# Patient Record
Sex: MALE | Race: WHITE | Employment: OTHER | RURAL
[De-identification: names, ages, dates, MRNs, and addresses within clinical notes are randomized per-mention and may not be internally consistent; named-entity substitution may affect disease eponyms.]

---

## 2017-01-19 ENCOUNTER — OFFICE VISIT (OUTPATIENT)
Dept: FAMILY MEDICINE CLINIC | Age: 82
End: 2017-01-19

## 2017-01-19 VITALS
OXYGEN SATURATION: 97 % | HEIGHT: 66 IN | WEIGHT: 181 LBS | BODY MASS INDEX: 29.09 KG/M2 | TEMPERATURE: 97.2 F | SYSTOLIC BLOOD PRESSURE: 130 MMHG | HEART RATE: 71 BPM | DIASTOLIC BLOOD PRESSURE: 72 MMHG | RESPIRATION RATE: 22 BRPM

## 2017-01-19 DIAGNOSIS — R56.9 SEIZURE (HCC): ICD-10-CM

## 2017-01-19 DIAGNOSIS — I10 ESSENTIAL HYPERTENSION: ICD-10-CM

## 2017-01-19 DIAGNOSIS — E78.00 PURE HYPERCHOLESTEROLEMIA: ICD-10-CM

## 2017-01-19 DIAGNOSIS — K21.9 GASTROESOPHAGEAL REFLUX DISEASE WITHOUT ESOPHAGITIS: ICD-10-CM

## 2017-01-19 DIAGNOSIS — E11.65 TYPE 2 DIABETES MELLITUS WITH HYPERGLYCEMIA, WITHOUT LONG-TERM CURRENT USE OF INSULIN (HCC): Primary | ICD-10-CM

## 2017-01-19 DIAGNOSIS — I95.1 ORTHOSTATIC HYPOTENSION: ICD-10-CM

## 2017-01-19 RX ORDER — METFORMIN HYDROCHLORIDE 500 MG/1
TABLET, FILM COATED, EXTENDED RELEASE ORAL
COMMUNITY
End: 2017-01-19 | Stop reason: ALTCHOICE

## 2017-01-19 RX ORDER — METFORMIN HYDROCHLORIDE 500 MG/1
500 TABLET, EXTENDED RELEASE ORAL 2 TIMES DAILY
Qty: 60 TAB | Refills: 5
Start: 2017-01-19 | End: 2017-03-06 | Stop reason: SDUPTHER

## 2017-01-19 NOTE — MR AVS SNAPSHOT
Visit Information Date & Time Provider Department Dept. Phone Encounter #  
 1/19/2017  8:00 AM Fawad Boyce [phone] [id number] Follow-up Instructions Return in about 3 months (around 4/19/2017) for Follow up. Follow-up and Disposition History Your Appointments 2/21/2017  1:00 PM  
ESTABLISHED PATIENT with Malik Tobias MD  
[address] [place] [facility]) Appt Note: 6 MTH FU  $0 cp  
 [address] [facility] [address] [address] [phone]  
  
   
 [address] [address]  
  
    
 4/20/2017  8:00 AM  
ESTABLISHED PATIENT with MD Fawad Ferrer 72 ([facility]) Appt Note: 3 mo F/U  
 [address] [facility] [address]  
[address] [facility] [address] [place] [address] Upcoming Health Maintenance Date Due  
 FOOT EXAM Q1 3/20/1942 EYE EXAM RETINAL OR DILATED Q1 3/20/1942 DTaP/Tdap/Td series (1 - Tdap) 3/20/1953 ZOSTER VACCINE AGE 60> 3/20/1992 GLAUCOMA SCREENING Q2Y 3/20/1997 MICROALBUMIN Q1 6/16/2017 HEMOGLOBIN A1C Q6M 6/21/2017 MEDICARE YEARLY EXAM 10/20/2017 LIPID PANEL Q1 12/21/2017 Allergies as of 1/19/2017  Review Complete On: 1/19/2017 By: Meena West MD  
 No Known Allergies Current Immunizations  Reviewed on 10/19/2016 Name Date Influenza High Dose Vaccine PF 10/19/2016 Influenza Vaccine 10/17/2014, 10/11/2013  9:32 AM  
 Influenza Vaccine (Quad) 10/28/2015 10:28 AM  
 Pneumococcal Conjugate (PCV-13) 10/28/2015 10:28 AM  
 Pneumococcal Polysaccharide (PPSV-23) 10/11/2013  9:33 AM  
  
 Not reviewed this visit You Were Diagnosed With   
  
 Codes Comments Type 2 diabetes mellitus with hyperglycemia, without long-term current use of insulin (HCC)    -  Primary ICD-10-CM: E11.65 ICD-9-CM: 250.00, 790.29 Gastroesophageal reflux disease without esophagitis     ICD-10-CM: K21.9 ICD-9-CM: 530.81 Orthostatic hypotension     ICD-10-CM: I95.1 ICD-9-CM: 458.0 Essential hypertension     ICD-10-CM: I10 
ICD-9-CM: 401.9 Pure hypercholesterolemia     ICD-10-CM: E78.00 ICD-9-CM: 272.0 Seizure ([address]     ICD-10-CM: R56.9 ICD-9-CM: 780.39 Vitals BP Pulse Temp Resp Height(growth percentile) 130/72 (BP 1 Location: Left arm, BP Patient Position: Sitting) 71 97.2 °F (36.2 °C) (Temporal) 22 5' 6\" (1.676 m) Weight(growth percentile) SpO2 BMI Smoking Status 181 lb (82.1 kg) 97% 29.21 kg/m2 Current Every Day Smoker BMI and BSA Data Body Mass Index Body Surface Area  
 29.21 kg/m 2 1.96 m 2 Preferred Pharmacy Pharmacy Name Phone [address], [address] [facility]  [address] & [facility]. Adalid Albarado [phone] Your Updated Medication List  
  
   
This list is accurate as of: 1/19/17  8:29 AM.  Always use your most recent med list.  
  
  
  
  
 acetaminophen 500 mg tablet Commonly known as:  TYLENOL Take  by mouth every six (6) hours as needed. aspirin delayed-release 81 mg tablet Take 1 Tab by mouth daily. betamethasone valerate 0.1 % ointment Commonly known as:  Jacqueline Jay Apply  to affected area two (2) times a day. cinnamon bark-chromium picolin 500-100 mg-mcg Cap Take 1 Cap by mouth daily. Indications: DIABETES MELLITUS FIBER THERAPY (M-CELL/SUGAR) Powd Generic drug:  methylcellulose (laxative) Take  by mouth. Iron 325 mg (65 mg iron) tablet Generic drug:  ferrous sulfate Take  by mouth Daily (before breakfast). Take 1 every other day * LaMICtal 25 mg tablet Generic drug:  lamoTRIgine Take  by mouth daily. * lamoTRIgine 100 mg tablet Commonly known as: LaMICtal  
Take 100 mg by mouth every twelve (12) hours. levETIRAcetam 250 mg tablet Commonly known as:  KEPPRA Take 1 Tab by mouth two (2) times a day. metFORMIN  mg tablet Commonly known as:  GLUCOPHAGE XR Take 1 Tab by mouth two (2) times a day. pantoprazole 40 mg tablet Commonly known as:  PROTONIX  
TAKE 1 TABLET DAILY pindolol 5 mg tablet Commonly known as:  VISKIN  
TAKE 1 TABLET TWICE A DAY  
  
 pravastatin 20 mg tablet Commonly known as:  PRAVACHOL  
TAKE 1 TABLET AT BEDTIME  
  
 PROBIOTIC 4X 10-15 mg Tbec Generic drug:  B.infantis-B.ani-B.long-B.bifi Take  by mouth. QUININE SULFATE PO Take 300 mg by mouth as needed. * Notice: This list has 2 medication(s) that are the same as other medications prescribed for you. Read the directions carefully, and ask your doctor or other care provider to review them with you. Follow-up Instructions Return in about 3 months (around 4/19/2017) for Follow up. Introducing [facility] SERVICES! Dear Oksana Blackwell: Thank you for requesting a [employer] account. Our records indicate that you already have an active [employer] account. You can access your account anytime at [email or web address] Did you know that you can access your hospital and ER discharge instructions at any time in [employer]? You can also review all of your test results from your hospital stay or ER visit. Additional Information If you have questions, please visit the Frequently Asked Questions section of the [employer] website at [email or web address] Remember, [employer] is NOT to be used for urgent needs. For medical emergencies, dial 911. Now available from your iPhone and Android! Please provide this summary of care documentation to your next provider. Your primary care clinician is listed as Feng Esteban. If you have any questions after today's visit, please call [phone].

## 2017-01-19 NOTE — PROGRESS NOTES
Subjective:  Kavya Wright presents for follow-up doing well no interval problems. No chest pain, no angina no shortness of breath. No orthopnea or PND. No dependent edema. No abdominal pain, change in bowel habits, no blood in stool or black stools. No urinary frequency, urgency, dysuria. No change in voiding pattern or stream, no significant nocturia. No problems with medications and is compliant. Sugars fluctuating somewhat throughout the day typically fasting is 120 ± 20 his last hemoglobin A1c 12/21/16 was 6.8. Blood pressure has been well-controlled normotensive on checks  Denies any palpitations presyncope or syncope trying to keep active  No seizure activity continues to follow-up with neurology    Problem list reviewed as part of this encounter. Past Medical History   Diagnosis Date    Diabetes mellitus, type 2 ([address]     DJD (degenerative joint disease)     GERD (gastroesophageal reflux disease)     Hypertension     Orthostatic hypotension     Parasomnia     PUD (peptic ulcer disease)     Pure hypercholesterolemia     Seizure ([address]       Medication list reviewed and updated. Review of Systems -as per the above in previous documentation  Objective:  Visit Vitals    /72 (BP 1 Location: Left arm, BP Patient Position: Sitting)    Pulse 71    Temp 97.2 °F (36.2 °C) (Temporal)    Resp 22    Ht 5' 6\" (1.676 m)    Wt 181 lb (82.1 kg)    SpO2 97%    BMI 29.21 kg/m2     Alert and oriented. No acute distress. HEENT   Eyes pupils equal, round, react to light and accommodation. Extraocular movements intact. Nose patent. Mouth and throat is clear. Neck supple full range of motion no thyromegaly. No carotid bruits. No significant lymphadenopathy  Lungs clear to auscultation without wheezes, rales, or rhonchi. Heart  RRR,  S1 & S2 are normal intensity. No murmur; no gallop  Abdomen bowel sounds active. No tenderness, guarding, rebound, masses, organomegaly.   Back no CVA tenderness. Extremities without clubbing, cyanosis, or edema  Neuro HMF intact. Motor is 5 over 5 and symmetrical.    Results for orders placed or performed in visit on 12/21/16   CBC WITH AUTOMATED DIFF   Result Value Ref Range    WBC 7.0 3.4 - 10.8 x10E3/uL    RBC 5.23 4. 14 - 5.80 x10E6/uL    HGB 16.0 12.6 - 17.7 g/dL    HCT 45.2 37.5 - 51.0 %    MCV 86 79 - 97 fL    MCH 30.6 26.6 - 33.0 pg    MCHC 35.4 31.5 - 35.7 g/dL    RDW 13.3 12.3 - 15.4 %    PLATELET 783 460 - 078 x10E3/uL    NEUTROPHILS 87 %    Lymphocytes 6 %    MONOCYTES 6 %    EOSINOPHILS 1 %    BASOPHILS 0 %    ABS. NEUTROPHILS 6.1 1.4 - 7.0 x10E3/uL    Abs Lymphocytes 0.4 (L) 0.7 - 3.1 x10E3/uL    ABS. MONOCYTES 0.4 0.1 - 0.9 x10E3/uL    ABS. EOSINOPHILS 0.0 0.0 - 0.4 x10E3/uL    ABS. BASOPHILS 0.0 0.0 - 0.2 x10E3/uL    IMMATURE GRANULOCYTES 0 %    ABS. IMM. GRANS. 0.0 0.0 - 0.1 x10E3/uL   LIPID PANEL   Result Value Ref Range    Cholesterol, total 134 100 - 199 mg/dL    Triglyceride 66 0 - 149 mg/dL    HDL Cholesterol 44 >39 mg/dL    VLDL, calculated 13 5 - 40 mg/dL    LDL, calculated 77 0 - 99 mg/dL   METABOLIC PANEL, COMPREHENSIVE   Result Value Ref Range    Glucose 184 (H) 65 - 99 mg/dL    BUN 25 8 - 27 mg/dL    Creatinine 1.38 (H) 0.76 - 1.27 mg/dL    GFR est non-AA 47 (L) >59 mL/min/1.73    GFR est AA 54 (L) >59 mL/min/1.73    BUN/Creatinine ratio 18 10 - 22    Sodium 141 134 - 144 mmol/L    Potassium 4.9 3.5 - 5.2 mmol/L    Chloride 100 96 - 106 mmol/L    CO2 25 18 - 29 mmol/L    Calcium 8.6 8.6 - 10.2 mg/dL    Protein, total 6.3 6.0 - 8.5 g/dL    Albumin 4.1 3.5 - 4.7 g/dL    GLOBULIN, TOTAL 2.2 1.5 - 4.5 g/dL    A-G Ratio 1.9 1.1 - 2.5    Bilirubin, total 0.7 0.0 - 1.2 mg/dL    Alk.  phosphatase 90 39 - 117 IU/L    AST 28 0 - 40 IU/L    ALT 17 0 - 44 IU/L   HEMOGLOBIN A1C WITH EAG   Result Value Ref Range    Hemoglobin A1c 6.8 (H) 4.8 - 5.6 %    Estimated average glucose 148 mg/dL   LAMOTRIGINE (LAMICTAL)   Result Value Ref Range Lamotrigine 2.2 2.0 - 20.0 ug/mL   LEVETIRACETAM (KEPPRA)   Result Value Ref Range    LEVETIRACETAM, S 1.7 (L) 10.0 - 40.0 ug/mL   CKD REPORT   Result Value Ref Range    Interpretation Note          Assessment:  Encounter Diagnoses   Name Primary?  Type 2 diabetes mellitus with hyperglycemia, without long-term current use of insulin (HCC) Yes    Gastroesophageal reflux disease without esophagitis     Orthostatic hypotension     Essential hypertension     Pure hypercholesterolemia     Seizure ([facility] [address]            Plan:  See orders. Orders Placed This Encounter    DISCONTD: metFORMIN (GLUMETZA ER) 500 mg TG24 24 hour tablet     Sig: Take  by mouth.  metFORMIN ER (GLUCOPHAGE XR) 500 mg tablet     Sig: Take 1 Tab by mouth two (2) times a day. Dispense:  60 Tab     Refill:  5    medications reviewed and will continue on same continue to watch diet. Monitor blood sugar blood pressure bring in for review  Follow-up 3 months or as needed        There are no Patient Instructions on file for this visit.     (Please note that portions of this note were completed with a voice recognition program. Efforts were made to edit the dictations but occasionally words are mis-transcribed.)

## 2017-02-21 ENCOUNTER — OFFICE VISIT (OUTPATIENT)
Dept: CARDIOLOGY CLINIC | Age: 82
End: 2017-02-21

## 2017-02-21 VITALS
RESPIRATION RATE: 18 BRPM | WEIGHT: 182 LBS | HEIGHT: 66 IN | DIASTOLIC BLOOD PRESSURE: 70 MMHG | OXYGEN SATURATION: 99 % | HEART RATE: 69 BPM | BODY MASS INDEX: 29.25 KG/M2 | SYSTOLIC BLOOD PRESSURE: 130 MMHG

## 2017-02-21 DIAGNOSIS — R42 DIZZINESS: ICD-10-CM

## 2017-02-21 DIAGNOSIS — I95.1 ORTHOSTATIC HYPOTENSION: Primary | ICD-10-CM

## 2017-02-21 DIAGNOSIS — E78.00 PURE HYPERCHOLESTEROLEMIA: ICD-10-CM

## 2017-02-21 DIAGNOSIS — E11.65 TYPE 2 DIABETES MELLITUS WITH HYPERGLYCEMIA, WITHOUT LONG-TERM CURRENT USE OF INSULIN (HCC): ICD-10-CM

## 2017-02-21 DIAGNOSIS — G45.9 TRANSIENT CEREBRAL ISCHEMIA, UNSPECIFIED TYPE: ICD-10-CM

## 2017-02-21 DIAGNOSIS — I10 ESSENTIAL HYPERTENSION: ICD-10-CM

## 2017-02-21 NOTE — MR AVS SNAPSHOT
Visit Information Date & Time Provider Department Dept. Phone Encounter #  
 2/21/2017  1:00 PM Lucía Benito, [address] [facility] [phone] Follow-up Instructions Return in about 6 months (around 8/21/2017). Follow-up and Disposition History Your Appointments 4/20/2017  8:00 AM  
ESTABLISHED PATIENT with Precious Mathew MD  
[address] [place] ([place] [place]) Appt Note: 3 mo F/U  
 [address] [address]  
[address] [facility] [address] [address] 7/18/2017  2:20 PM  
ESTABLISHED PATIENT with Lucía Benito MD  
[address] [place] [facility]) Appt Note: 6 mo fu $0cp [address] [facility] [address] [address] [phone]  
  
   
 [address] [place] [address] Upcoming Health Maintenance Date Due  
 FOOT EXAM Q1 3/20/1942 EYE EXAM RETINAL OR DILATED Q1 3[birth year]20/1942 DTaP/Tdap/Td series (1 - Tdap) 3/20/1953 ZOSTER VACCINE AGE 60> 3/20/1992 GLAUCOMA SCREENING Q2Y 3/20/1997 MICROALBUMIN Q1 6/16/2017 HEMOGLOBIN A1C Q6M 6/21/2017 MEDICARE YEARLY EXAM 10/20/2017 LIPID PANEL Q1 12/21/2017 Allergies as of 2/21/2017  Review Complete On: 2/21/2017 By: Lucía Benito MD  
 No Known Allergies Current Immunizations  Reviewed on 10/19/2016 Name Date Influenza High Dose Vaccine PF 10/19/2016 Influenza Vaccine 10/17/2014, 10/11/2013  9:32 AM  
 Influenza Vaccine (Quad) 10/28/2015 10:28 AM  
 Pneumococcal Conjugate (PCV-13) 10/28/2015 10:28 AM  
 Pneumococcal Polysaccharide (PPSV-23) 10/11/2013  9:33 AM  
  
 Not reviewed this visit You Were Diagnosed With   
  
 Codes Comments Orthostatic hypotension    -  Primary ICD-10-CM: I95.1 ICD-9-CM: 458.0 Essential hypertension     ICD-10-CM: I10 
ICD-9-CM: 401.9 Type 2 diabetes mellitus with hyperglycemia, without long-term current use of insulin (HCC)     ICD-10-CM: E11.65 ICD-9-CM: 250.00, 790.29 Pure hypercholesterolemia     ICD-10-CM: E78.00 ICD-9-CM: 272.0 Dizziness     ICD-10-CM: F34 ICD-9-CM: 780.4 Transient cerebral ischemia, unspecified type     ICD-10-CM: G45.9 ICD-9-CM: 435.9 Vitals BP Pulse Resp Height(growth percentile) Weight(growth percentile) SpO2  
 130/70 69 18 5' 6\" (1.676 m) 182 lb (82.6 kg) 99% BMI Smoking Status 29.38 kg/m2 Current Every Day Smoker Vitals History BMI and BSA Data Body Mass Index Body Surface Area  
 29.38 kg/m 2 1.96 m 2 Preferred Pharmacy Pharmacy Name Phone [address] [facility] [phone] Your Updated Medication List  
  
   
This list is accurate as of: 2/21/17  1:30 PM.  Always use your most recent med list.  
  
  
  
  
 acetaminophen 500 mg tablet Commonly known as:  TYLENOL Take  by mouth every six (6) hours as needed. aspirin delayed-release 81 mg tablet Take 1 Tab by mouth daily. betamethasone valerate 0.1 % ointment Commonly known as:  Selinda Devonte Apply  to affected area two (2) times a day. cinnamon bark-chromium picolin 500-100 mg-mcg Cap Take 1 Cap by mouth daily. Indications: DIABETES MELLITUS FIBER THERAPY (M-CELL/SUGAR) Powd Generic drug:  methylcellulose (laxative) Take  by mouth. Iron 325 mg (65 mg iron) tablet Generic drug:  ferrous sulfate Take  by mouth Daily (before breakfast). Take 1 every other day * LaMICtal 25 mg tablet Generic drug:  lamoTRIgine Take  by mouth daily. * lamoTRIgine 100 mg tablet Commonly known as: LaMICtal  
Take 100 mg by mouth every twelve (12) hours. levETIRAcetam 250 mg tablet Commonly known as:  KEPPRA Take 1 Tab by mouth two (2) times a day. metFORMIN  mg tablet Commonly known as:  GLUCOPHAGE XR Take 1 Tab by mouth two (2) times a day. pantoprazole 40 mg tablet Commonly known as:  PROTONIX  
TAKE 1 TABLET DAILY pindolol 5 mg tablet Commonly known as:  VISKIN  
TAKE 1 TABLET TWICE A DAY  
  
 pravastatin 20 mg tablet Commonly known as:  PRAVACHOL  
TAKE 1 TABLET AT BEDTIME  
  
 PROBIOTIC 4X 10-15 mg Tbec Generic drug:  B.infantis-B.ani-B.long-B.bifi Take  by mouth. QUININE SULFATE PO Take 300 mg by mouth as needed. * Notice: This list has 2 medication(s) that are the same as other medications prescribed for you. Read the directions carefully, and ask your doctor or other care provider to review them with you. We Performed the Following AMB POC EKG ROUTINE W/ 12 LEADS, INTER & REP [59588 CPT(R)] Follow-up Instructions Return in about 6 months (around 8/21/2017). Introducing [facility] & HEALTH SERVICES! Dear Roel Kumar: Thank you for requesting a [employer] account. Our records indicate that you already have an active [employer] account. You can access your account anytime at [email or web address] Did you know that you can access your hospital and ER discharge instructions at any time in [employer]? You can also review all of your test results from your hospital stay or ER visit. Additional Information If you have questions, please visit the Frequently Asked Questions section of the [employer] website at [email or web address] Remember, [employer] is NOT to be used for urgent needs. For medical emergencies, dial 911. Now available from your iPhone and Android! Please provide this summary of care documentation to your next provider. If you have any questions after today's visit, please call [phone].

## 2017-02-21 NOTE — PROGRESS NOTES
Keron Bravo is a 80 y.o. male is here for routine f/u. No CV sx or complaints. Some issues with seizures/tremor--now on Keppra and doing well (Neuro eval, EEG's, etc). Continues to see PCP for DM management/labs. The patient denies chest pain/ shortness of breath, orthopnea, PND, LE edema, palpitations, syncope, presyncope or fatigue. Patient Active Problem List    Diagnosis Date Noted    Se[facility] ([facility]     GERD (gastroesophageal reflux disease) 07/15/2015    Orthostatic hypotension     Hypertension     Pure hypercholesterolemia     [facility]       No primary care provider on file. Past Medical History   Diagnosis Date    [facility]     DJD (degenerative joint disease)     GERD (gastroesophageal reflux disease)     Hypertension     Orthostatic hypotension     Parasomnia     PUD (peptic ulcer disease)     Pure hypercholesterolemia     Seizure [facility])       Past Surgical History   Procedure Laterality Date    Hx knee replacement       Bilateral    Endoscopy, colon, diagnostic  01/2012    Hx cholecystectomy  02/2012     laparoscopic    Hx gi  01/2012     endoscopy,BX     No Known Allergies   Family History   Problem Relation Age of Onset    COPD Father     COPD Sister     COPD Sister       Social History     Social History    Marital status:      Spouse name: N/A    Number of children: N/A    Years of education: N/A     Occupational History    Not on file. Social History Main Topics    Smoking status: Current Every Day Smoker     Types: Cigars    Smokeless tobacco: Never Used    Alcohol use No    Drug use: No    Sexual activity: No     Other Topics Concern    Not on file     Social History Narrative      Current Outpatient Prescriptions   Medication Sig    pravastatin (PRAVACHOL) 20 mg tablet TAKE 1 TABLET AT BEDTIME    metFORMIN ER (GLUCOPHAGE XR) 500 mg tablet Take 1 Tab by mouth two (2) times a day.     levETIRAcetam (KEPPRA) 250 mg tablet Take 1 Tab by mouth two (2) times a day. (Patient taking differently: Take 250 mg by mouth nightly.)    lamoTRIgine (LAMICTAL) 100 mg tablet Take 100 mg by mouth every twelve (12) hours.  pantoprazole (PROTONIX) 40 mg tablet TAKE 1 TABLET DAILY    cinnamon bark-chromium picolin 500-100 mg-mcg cap Take 1 Cap by mouth daily. Indications: DIABETES MELLITUS    pindolol (VISKIN) 5 mg tablet TAKE 1 TABLET TWICE A DAY    aspirin delayed-release 81 mg tablet Take 1 Tab by mouth daily.  QUININE SULFATE PO Take 300 mg by mouth as needed.  ferrous sulfate (IRON) 325 mg (65 mg iron) tablet Take  by mouth Daily (before breakfast). Take 1 every other day    betamethasone valerate (VALISONE) 0.1 % ointment Apply  to affected area two (2) times a day. (Patient taking differently: Apply  to affected area two (2) times daily as needed.)    methylcellulose, laxative, (FIBER THERAPY) Powd Take  by mouth.  acetaminophen (TYLENOL) 500 mg tablet Take  by mouth every six (6) hours as needed.  B.infantis-B.ani-B.long-B.bifi (PROBIOTIC 4X) 10-15 mg TbEC Take  by mouth.  lamoTRIgine (LAMICTAL) 25 mg tablet Take  by mouth daily. No current facility-administered medications for this visit. Review of Symptoms:    CONST  No weight change. No fever, chills, sweats    ENT No visual changes, URI sx, sore throat    CV  See HPI   RESP  No cough, or sputum, wheezing. Also see HPI   GI  No abdominal pain or change in bowel habits. No heartburn or dysphagia. No melena or rectal bleeding.   No dysuria, urgency, frequency, hematuria   MSKEL  No joint pain, swelling. No muscle pain. SKIN  No rash or lesions. NEURO  No headache, syncope, or seizure. No weakness, loss of sensation, or paresthesias. PSYCH  No low mood or depression  No anxiety. HE/LYMPH  No easy bruising, abnormal bleeding, or enlarged glands.         Physical ExamPhysical Exam:    Visit Vitals    Pulse 69    Resp 18    Ht 5' 6\" (1.676 m)    Wt 182 lb (82.6 kg)    SpO2 99%    BMI 29.38 kg/m2     Extended / Orthostatic Vitals:  Patient Position 2: Sitting  BP 2: 130/70  Pulse 2: 67  Patient Position 3: Standing  BP 3: 118/70  Pulse 3: 76    Gen: NAD  HEENT:  PERRL, throat clear  Neck: no mass or thyromegaly, no JVD   Heart:  Regular,Nl S1S2,  no murmur, gallop or rub.   Lungs:  clear  Abdomen:   Soft, non-tender, bowel sounds are active.   Extremities:  No edema  Pulse: symmetric  Neuro: A&O times 3, WNL      Cardiographics    ECG: NSR, PRWP, NST, no acute changes    CARDIAC TESTING;    Carotid Dopplers 2/4/16--minimal (<15%) LUCIEN plaque, 0% Left    Holter- 2/2/16--NSR, isolated PAC's/PVC's    Echo 2/4/16--Normal LV size/walls, LVEF 65-70, D1, trace to mild MR, RVSP <35      Labs:   Lab Results   Component Value Date/Time    Sodium 141 12/21/2016 08:18 AM    Sodium 143 06/16/2016 08:58 AM    Sodium 140 02/16/2016 09:07 AM    Sodium 142 10/28/2015 10:18 AM    Sodium 142 07/01/2015 08:10 AM    Potassium 4.9 12/21/2016 08:18 AM    Potassium 4.8 06/16/2016 08:58 AM    Potassium 5.1 02/16/2016 09:07 AM    Potassium 4.8 10/28/2015 10:18 AM    Potassium 4.3 07/01/2015 08:10 AM    Chloride 100 12/21/2016 08:18 AM    Chloride 103 06/16/2016 08:58 AM    Chloride 101 02/16/2016 09:07 AM    Chloride 101 10/28/2015 10:18 AM    Chloride 102 07/01/2015 08:10 AM    CO2 25 12/21/2016 08:18 AM    CO2 25 06/16/2016 08:58 AM    CO2 24 02/16/2016 09:07 AM    CO2 26 10/28/2015 10:18 AM    CO2 24 07/01/2015 08:10 AM    Glucose 184 12/21/2016 08:18 AM    Glucose 195 06/16/2016 08:58 AM    Glucose 342 02/16/2016 09:07 AM    Glucose 129 10/28/2015 10:18 AM    Glucose 153 07/01/2015 08:10 AM    BUN 25 12/21/2016 08:18 AM    BUN 12 06/16/2016 08:58 AM    BUN 15 02/16/2016 09:07 AM    BUN 12 10/28/2015 10:18 AM    BUN 16 07/01/2015 08:10 AM    Creatinine 1.38 12/21/2016 08:18 AM    Creatinine 1.00 06/16/2016 08:58 AM    Creatinine 1.27 02/16/2016 09:07 AM    Creatinine 1.24 10/28/2015 10:18 AM    Creatinine 1.17 07/01/2015 08:10 AM    BUN/Creatinine ratio 18 12/21/2016 08:18 AM    BUN/Creatinine ratio 12 06/16/2016 08:58 AM    BUN/Creatinine ratio 12 02/16/2016 09:07 AM    BUN/Creatinine ratio 10 10/28/2015 10:18 AM    BUN/Creatinine ratio 14 07/01/2015 08:10 AM    GFR est AA 54 12/21/2016 08:18 AM    GFR est AA 80 06/16/2016 08:58 AM    GFR est AA 60 02/16/2016 09:07 AM    GFR est AA 62 10/28/2015 10:18 AM    GFR est AA 66 07/01/2015 08:10 AM    GFR est non-AA 47 12/21/2016 08:18 AM    GFR est non-AA 69 06/16/2016 08:58 AM    GFR est non-AA 52 02/16/2016 09:07 AM    GFR est non-AA 53 10/28/2015 10:18 AM    GFR est non-AA 57 07/01/2015 08:10 AM    Calcium 8.6 12/21/2016 08:18 AM    Calcium 8.7 06/16/2016 08:58 AM    Calcium 8.7 02/16/2016 09:07 AM    Calcium 9.2 10/28/2015 10:18 AM    Calcium 8.8 07/01/2015 08:10 AM    Bilirubin, total 0.7 12/21/2016 08:18 AM    Bilirubin, total 0.3 06/16/2016 08:58 AM    Bilirubin, total 0.3 02/16/2016 09:07 AM    AST (SGOT) 28 12/21/2016 08:18 AM    AST (SGOT) 22 06/16/2016 08:58 AM    AST (SGOT) 29 02/16/2016 09:07 AM    AST (SGOT) 23 10/28/2015 10:18 AM    AST (SGOT) 31 07/01/2015 08:10 AM    Alk. phosphatase 90 12/21/2016 08:18 AM    Alk. phosphatase 97 06/16/2016 08:58 AM    Alk.  phosphatase 118 02/16/2016 09:07 AM    Protein, total 6.3 12/21/2016 08:18 AM    Protein, total 6.2 06/16/2016 08:58 AM    Protein, total 6.3 02/16/2016 09:07 AM    Albumin 4.1 12/21/2016 08:18 AM    Albumin 4.0 06/16/2016 08:58 AM    Albumin 3.8 02/16/2016 09:07 AM    A-G Ratio 1.9 12/21/2016 08:18 AM    A-G Ratio 1.8 06/16/2016 08:58 AM    A-G Ratio 1.5 02/16/2016 09:07 AM    ALT (SGPT) 17 12/21/2016 08:18 AM    ALT (SGPT) 18 06/16/2016 08:58 AM    ALT (SGPT) 38 02/16/2016 09:07 AM    ALT (SGPT) 19 10/17/2014 10:21 AM    ALT (SGPT) 32 05/16/2014 10:19 AM     No results found for: CPK, CPKX, CPX  Lab Results   Component Value Date/Time    Cholesterol, total 134 12/21/2016 08:18 AM    Cholesterol, total 133 06/16/2016 08:58 AM    Cholesterol, total 133 02/16/2016 09:07 AM    Cholesterol, total 157 10/28/2015 10:18 AM    Cholesterol, total 162 07/01/2015 08:10 AM    HDL Cholesterol 44 12/21/2016 08:18 AM    HDL Cholesterol 38 06/16/2016 08:58 AM    HDL Cholesterol 21 02/16/2016 09:07 AM    HDL Cholesterol 36 10/28/2015 10:18 AM    HDL Cholesterol 45 07/01/2015 08:10 AM    LDL, calculated 77 12/21/2016 08:18 AM    LDL, calculated 74 06/16/2016 08:58 AM    LDL, calculated 86 02/16/2016 09:07 AM    LDL, calculated 84 10/28/2015 10:18 AM    LDL, calculated 102 07/01/2015 08:10 AM    Triglyceride 66 12/21/2016 08:18 AM    Triglyceride 103 06/16/2016 08:58 AM    Triglyceride 132 02/16/2016 09:07 AM    Triglyceride 185 10/28/2015 10:18 AM    Triglyceride 76 07/01/2015 08:10 AM     No results found for this or any previous visit. Assessment:         Patient Active Problem List    Diagnosis Date Noted    Se[facility] ([facility]     GERD (gastroesophageal reflux disease) 07/15/2015    Orthostatic hypotension     Hypertension     Pure hypercholesterolemia     Diabetes mellitus, type 2 ([facility]       No CV sx or complaints. Some issues with seizures/tremor--now on Keppra and doing well (Neuro eval, EEG's, etc). Continues to see PCP for DM management/labs. Plan:     Doing well with no adverse cardiac symptoms. Lipids and labs followed by PCP. Continue current care and f/u in 6 months.     Melba Berkowitz MD

## 2017-02-27 RX ORDER — PINDOLOL 5 MG/1
TABLET ORAL
Qty: 180 TAB | Refills: 1 | Status: SHIPPED | OUTPATIENT
Start: 2017-02-27 | End: 2017-08-26 | Stop reason: SDUPTHER

## 2017-03-06 RX ORDER — METFORMIN HYDROCHLORIDE 500 MG/1
TABLET, EXTENDED RELEASE ORAL
Qty: 180 TAB | Refills: 0 | Status: SHIPPED | OUTPATIENT
Start: 2017-03-06 | End: 2017-06-09 | Stop reason: SDUPTHER

## 2017-03-06 RX ORDER — PANTOPRAZOLE SODIUM 40 MG/1
TABLET, DELAYED RELEASE ORAL
Qty: 90 TAB | Refills: 0 | Status: SHIPPED | OUTPATIENT
Start: 2017-03-06 | End: 2017-06-09 | Stop reason: SDUPTHER

## 2017-04-20 ENCOUNTER — OFFICE VISIT (OUTPATIENT)
Dept: FAMILY MEDICINE CLINIC | Age: 82
End: 2017-04-20

## 2017-04-20 VITALS
SYSTOLIC BLOOD PRESSURE: 153 MMHG | HEART RATE: 61 BPM | OXYGEN SATURATION: 99 % | DIASTOLIC BLOOD PRESSURE: 64 MMHG | BODY MASS INDEX: 29.22 KG/M2 | TEMPERATURE: 98.2 F | WEIGHT: 181.8 LBS | RESPIRATION RATE: 16 BRPM | HEIGHT: 66 IN

## 2017-04-20 DIAGNOSIS — E11.65 TYPE 2 DIABETES MELLITUS WITH HYPERGLYCEMIA, WITHOUT LONG-TERM CURRENT USE OF INSULIN (HCC): ICD-10-CM

## 2017-04-20 DIAGNOSIS — R56.9 SEIZURE (HCC): Primary | ICD-10-CM

## 2017-04-20 DIAGNOSIS — I10 ESSENTIAL HYPERTENSION: ICD-10-CM

## 2017-04-20 NOTE — MR AVS SNAPSHOT
Visit Information Date & Time Provider Department Dept. Phone Encounter #  
 4/20/2017  8:00 AM Gordo Medina MD [address] [place] [address] [phone] [id number] Follow-up Instructions Return in about 6 months (around 10/20/2017). Your Appointments 7/18/2017  2:20 PM  
ESTABLISHED PATIENT with Nani Leal MD  
[address] [facility] [place] [facility] [address]) Appt Note: 6 mo fu $0cp 13[address] [facility] 67 [id number] [phone]  
  
   
 [address] [place] [id number] Upcoming Health Maintenance Date Due  
 FOOT EXAM Q1 3/20/1942 EYE EXAM RETINAL OR DILATED Q1 3/20/1942 DTaP/Tdap/Td series (1 - Tdap) 3/20/1953 ZOSTER VACCINE AGE 60> 3/20/1992 GLAUCOMA SCREENING Q2Y 3/20/1997 MICROALBUMIN Q1 6/16/2017 HEMOGLOBIN A1C Q6M 6/21/2017 MEDICARE YEARLY EXAM 10/20/2017 LIPID PANEL Q1 12/21/2017 Allergies as of 4/20/2017  Review Complete On: 4/20/2017 By: Gordo Medina MD  
 No Known Allergies Current Immunizations  Reviewed on 10/19/2016 Name Date Influenza High Dose Vaccine PF 10/19/2016 Influenza Vaccine 10/17/2014, 10/11/2013  9:32 AM  
 Influenza Vaccine (Quad) 10/28/2015 10:28 AM  
 Pneumococcal Conjugate (PCV-13) 10/28/2015 10:28 AM  
 Pneumococcal Polysaccharide (PPSV-23) 10/11/2013  9:33 AM  
  
 Not reviewed this visit You Were Diagnosed With   
  
 Codes Comments Seizure ([facility]    -  Primary ICD-10-CM: R56.9 ICD-9-CM: 780.39 Type 2 diabetes mellitus with hyperglycemia, without long-term current use of insulin (HCC)     ICD-10-CM: E11.65 ICD-9-CM: 250.00, 790.29 Essential hypertension     ICD-10-CM: I10 
ICD-9-CM: 401.9 Vitals BP Pulse Temp Resp Height(growth percentile) 153/64 (BP 1 Location: Left arm, BP Patient Position: Sitting) 61 98.2 °F (36.8 °C) (Temporal) 16 5' 6\" (1.676 m) Weight(growth percentile) SpO2 BMI Smoking Status 181 lb 12.8 oz (82.5 kg) 99% 29.34 kg/m2 Current Every Day Smoker BMI and BSA Data Body Mass Index Body Surface Area  
 29.34 kg/m 2 1.96 m 2 Preferred Pharmacy Pharmacy Name Phone [address] [phone] Your Updated Medication List  
  
   
This list is accurate as of: 4/20/17  8:22 AM.  Always use your most recent med list.  
  
  
  
  
 acetaminophen 500 mg tablet Commonly known as:  TYLENOL Take  by mouth every six (6) hours as needed. aspirin delayed-release 81 mg tablet Take 1 Tab by mouth daily. betamethasone valerate 0.1 % ointment Commonly known as:  Fay Ing Apply  to affected area two (2) times a day. cinnamon bark-chromium picolin 500-100 mg-mcg Cap Take 1 Cap by mouth daily. Indications: DIABETES MELLITUS FIBER THERAPY (M-CELL/SUGAR) Powd Generic drug:  methylcellulose (laxative) Take  by mouth. Iron 325 mg (65 mg iron) tablet Generic drug:  ferrous sulfate Take  by mouth Daily (before breakfast). Take 1 every other day * LaMICtal 25 mg tablet Generic drug:  lamoTRIgine Take  by mouth daily. * lamoTRIgine 100 mg tablet Commonly known as: LaMICtal  
Take 100 mg by mouth every twelve (12) hours. levETIRAcetam 250 mg tablet Commonly known as:  KEPPRA Take 1 Tab by mouth two (2) times a day. metFORMIN  mg tablet Commonly known as:  GLUCOPHAGE XR  
TAKE 2 TABLETS DAILY pantoprazole 40 mg tablet Commonly known as:  PROTONIX  
TAKE 1 TABLET DAILY pindolol 5 mg tablet Commonly known as:  VISKIN  
TAKE 1 TABLET TWICE A DAY  
  
 pravastatin 20 mg tablet Commonly known as:  PRAVACHOL  
TAKE 1 TABLET AT BEDTIME  
  
 PROBIOTIC 4X 10-15 mg Tbec Generic drug:  B.infantis-B.ani-B.long-B.bifi Take  by mouth.   
  
 QUININE SULFATE PO  
 Take 300 mg by mouth as needed. * Notice: This list has 2 medication(s) that are the same as other medications prescribed for you. Read the directions carefully, and ask your doctor or other care provider to review them with you. Follow-up Instructions Return in about 6 months (around 10/20/2017). Introducing [facility] SERVICES! Dear Hang Burks: Thank you for requesting a [employer] account. Our records indicate that you already have an active [employer] account. You can access your account anytime at [email or web address] Did you know that you can access your hospital and ER discharge instructions at any time in [employer]? You can also review all of your test results from your hospital stay or ER visit. Additional Information If you have questions, please visit the Frequently Asked Questions section of the [employer] website at [email or web address] Remember, [employer] is NOT to be used for urgent needs. For medical emergencies, dial 911. Now available from your iPhone and Android! Please provide this summary of care documentation to your next provider. Your primary care clinician is listed as Denia Nicole. If you have any questions after today's visit, please call [phone].

## 2017-04-20 NOTE — PROGRESS NOTES
Chief Complaint   Patient presents with    Elevated Blood Pressure         HPI:      Mr Talha Sifuentes is a very pleasant 79 yo retired [facility] [employer] [facility] (21 yrs) who has T2DM, mixed hyperlipidemia, and orthostatic hypotension. Dec 2016 A1c = 6.8. Managed with Metformin monotherapy. Dr Mahad Vickers follows for orthostatic hypotension. Latest LDL = 77 (Pravastatin). Neurologically evaluated and noted to have a firm diagnosis of Essential Tremor. Also taking meds for suspected sx disorder. Last seen by TONE Huerta, who initiated Lamictal in Sept 2016 and scheduled Mr Talha Sifuentes for a one year follow up. New Issues:  Has just cut 57 trees [place] his property this year. Home glucose runs in the 120-200 range and he notes that this is very exercise dependent. No Known Allergies    Current Outpatient Prescriptions   Medication Sig    metFORMIN ER (GLUCOPHAGE XR) 500 mg tablet TAKE 2 TABLETS DAILY    pantoprazole (PROTONIX) 40 mg tablet TAKE 1 TABLET DAILY    pindolol (VISKIN) 5 mg tablet TAKE 1 TABLET TWICE A DAY    pravastatin (PRAVACHOL) 20 mg tablet TAKE 1 TABLET AT BEDTIME    levETIRAcetam (KEPPRA) 250 mg tablet Take 1 Tab by mouth two (2) times a day. (Patient taking differently: Take 250 mg by mouth nightly.)    lamoTRIgine (LAMICTAL) 100 mg tablet Take 100 mg by mouth every twelve (12) hours.  lamoTRIgine (LAMICTAL) 25 mg tablet Take  by mouth daily.  cinnamon bark-chromium picolin 500-100 mg-mcg cap Take 1 Cap by mouth daily. Indications: DIABETES MELLITUS    aspirin delayed-release 81 mg tablet Take 1 Tab by mouth daily.  ferrous sulfate (IRON) 325 mg (65 mg iron) tablet Take  by mouth Daily (before breakfast). Take 1 every other day    betamethasone valerate (VALISONE) 0.1 % ointment Apply  to affected area two (2) times a day. (Patient taking differently: Apply  to affected area two (2) times daily as needed.)    methylcellulose, laxative, (FIBER THERAPY) Powd Take  by mouth.     B.infantis-B.ani-B.long-B.bifi (PROBIOTIC 4X) 10-15 mg TbEC Take  by mouth.  QUININE SULFATE PO Take 300 mg by mouth as needed.  acetaminophen (TYLENOL) 500 mg tablet Take  by mouth every six (6) hours as needed. No current facility-administered medications for this visit. Past Medical History:   Diagnosis Date    [facility] mellitus, type 2 ([facility]     DJD (degenerative joint disease)     GERD (gastroesophageal reflux disease)     Hypertension     Orthostatic hypotension     Parasomnia     PUD (peptic ulcer disease)     Pure hypercholesterolemia     Seizure ([facility]          ROS:  Denies fever, chills, cough, chest pain, SOB,  nausea, vomiting, or diarrhea. Denies wt loss, wt gain, hemoptysis, hematochezia or melena. Physical Examination:    /64 (BP 1 Location: Left arm, BP Patient Position: Sitting)  Pulse 61  Temp 98.2 °F (36.8 °C) (Temporal)   Resp 16  Ht 5' 6\" (1.676 m)  Wt 181 lb 12.8 oz (82.5 kg)  SpO2 99%  BMI 29.34 kg/m2    General: Alert and Ox3, Fluent speech  HEENT:  NC/AT, EOMI, OP: clear  Neck:  Supple, no adenopathy, JVD, mass or bruit  Chest:  Clear to Ausculation, without wheezes, rales, rubs or ronchi  Cardiac: RRR  Abdomen:  +BS, soft, nontender without palpable HSM  Extremities:  No cyanosis, clubbing or edema  Neurologic:  Ambulatory without assist, CN 2-12 grossly intact. Moves all extremities. Skin: no rash  Lymphadenopathy: no cervical or supraclavicular nodes      ASSESSMENT AND PLAN:     1. T2DM:  Labs today to assess degree of control. Foot care and hypoglycemic teaching  2. HTN:  A little up today but he is typically at goal  3. Sz:  Checking levels. 4.  RTC in 6 months    No orders of the defined types were placed in this encounter.       Wenceslao Romero MD, [address]

## 2017-04-23 LAB
ALBUMIN SERPL-MCNC: 4.1 G/DL (ref 3.5–4.7)
ALBUMIN/GLOB SERPL: 2 {RATIO} (ref 1.2–2.2)
ALP SERPL-CCNC: 107 IU/L (ref 39–117)
ALT SERPL-CCNC: 20 IU/L (ref 0–44)
AST SERPL-CCNC: 25 IU/L (ref 0–40)
BASOPHILS # BLD AUTO: 0 X10E3/UL (ref 0–0.2)
BASOPHILS NFR BLD AUTO: 0 %
BILIRUB SERPL-MCNC: 0.3 MG/DL (ref 0–1.2)
BUN SERPL-MCNC: 12 MG/DL (ref 8–27)
BUN/CREAT SERPL: 10 (ref 10–24)
CALCIUM SERPL-MCNC: 8.7 MG/DL (ref 8.6–10.2)
CHLORIDE SERPL-SCNC: 101 MMOL/L (ref 96–106)
CHOLEST SERPL-MCNC: 142 MG/DL (ref 100–199)
CO2 SERPL-SCNC: 22 MMOL/L (ref 18–29)
CREAT SERPL-MCNC: 1.2 MG/DL (ref 0.76–1.27)
EOSINOPHIL # BLD AUTO: 0.2 X10E3/UL (ref 0–0.4)
EOSINOPHIL NFR BLD AUTO: 4 %
ERYTHROCYTE [DISTWIDTH] IN BLOOD BY AUTOMATED COUNT: 13.9 % (ref 12.3–15.4)
EST. AVERAGE GLUCOSE BLD GHB EST-MCNC: 157 MG/DL
GLOBULIN SER CALC-MCNC: 2.1 G/DL (ref 1.5–4.5)
GLUCOSE SERPL-MCNC: 286 MG/DL (ref 65–99)
HBA1C MFR BLD: 7.1 % (ref 4.8–5.6)
HCT VFR BLD AUTO: 42.2 % (ref 37.5–51)
HDLC SERPL-MCNC: 39 MG/DL
HGB BLD-MCNC: 13.8 G/DL (ref 12.6–17.7)
IMM GRANULOCYTES # BLD: 0 X10E3/UL (ref 0–0.1)
IMM GRANULOCYTES NFR BLD: 0 %
INTERPRETATION: NORMAL
LAMOTRIGINE SERPL-MCNC: 4.4 UG/ML (ref 2–20)
LDLC SERPL CALC-MCNC: 70 MG/DL (ref 0–99)
LEVETIRACETAM SERPL-MCNC: 3.2 UG/ML (ref 10–40)
LYMPHOCYTES # BLD AUTO: 1.3 X10E3/UL (ref 0.7–3.1)
LYMPHOCYTES NFR BLD AUTO: 28 %
MCH RBC QN AUTO: 29.8 PG (ref 26.6–33)
MCHC RBC AUTO-ENTMCNC: 32.7 G/DL (ref 31.5–35.7)
MCV RBC AUTO: 91 FL (ref 79–97)
MONOCYTES # BLD AUTO: 0.3 X10E3/UL (ref 0.1–0.9)
MONOCYTES NFR BLD AUTO: 7 %
NEUTROPHILS # BLD AUTO: 2.9 X10E3/UL (ref 1.4–7)
NEUTROPHILS NFR BLD AUTO: 61 %
PLATELET # BLD AUTO: 181 X10E3/UL (ref 150–379)
POTASSIUM SERPL-SCNC: 5.1 MMOL/L (ref 3.5–5.2)
PROT SERPL-MCNC: 6.2 G/DL (ref 6–8.5)
RBC # BLD AUTO: 4.63 X10E6/UL (ref 4.14–5.8)
SODIUM SERPL-SCNC: 142 MMOL/L (ref 134–144)
TRIGL SERPL-MCNC: 164 MG/DL (ref 0–149)
TSH SERPL DL<=0.005 MIU/L-ACNC: 2.82 UIU/ML (ref 0.45–4.5)
VLDLC SERPL CALC-MCNC: 33 MG/DL (ref 5–40)
WBC # BLD AUTO: 4.8 X10E3/UL (ref 3.4–10.8)

## 2017-06-09 RX ORDER — PANTOPRAZOLE SODIUM 40 MG/1
TABLET, DELAYED RELEASE ORAL
Qty: 90 TAB | Refills: 1 | Status: SHIPPED | OUTPATIENT
Start: 2017-06-09 | End: 2017-12-13 | Stop reason: SDUPTHER

## 2017-06-09 RX ORDER — METFORMIN HYDROCHLORIDE 500 MG/1
TABLET, EXTENDED RELEASE ORAL
Qty: 180 TAB | Refills: 1 | Status: SHIPPED | OUTPATIENT
Start: 2017-06-09 | End: 2017-12-09 | Stop reason: SDUPTHER

## 2017-06-13 ENCOUNTER — TELEPHONE (OUTPATIENT)
Dept: FAMILY MEDICINE CLINIC | Age: 82
End: 2017-06-13

## 2017-06-13 DIAGNOSIS — K21.9 GASTROESOPHAGEAL REFLUX DISEASE WITHOUT ESOPHAGITIS: Primary | ICD-10-CM

## 2017-06-13 RX ORDER — PANTOPRAZOLE SODIUM 40 MG/1
40 TABLET, DELAYED RELEASE ORAL DAILY
Qty: 30 TAB | Refills: 11 | Status: SHIPPED | OUTPATIENT
Start: 2017-06-13 | End: 2017-07-18 | Stop reason: SDUPTHER

## 2017-07-18 ENCOUNTER — OFFICE VISIT (OUTPATIENT)
Dept: CARDIOLOGY CLINIC | Age: 82
End: 2017-07-18

## 2017-07-18 VITALS
HEART RATE: 60 BPM | WEIGHT: 181 LBS | SYSTOLIC BLOOD PRESSURE: 136 MMHG | HEIGHT: 66 IN | OXYGEN SATURATION: 97 % | RESPIRATION RATE: 14 BRPM | DIASTOLIC BLOOD PRESSURE: 64 MMHG | BODY MASS INDEX: 29.09 KG/M2

## 2017-07-18 DIAGNOSIS — I10 ESSENTIAL HYPERTENSION: Primary | ICD-10-CM

## 2017-07-18 DIAGNOSIS — R56.9 SEIZURE (HCC): ICD-10-CM

## 2017-07-18 DIAGNOSIS — R00.2 HEART PALPITATIONS: ICD-10-CM

## 2017-07-18 DIAGNOSIS — E11.65 TYPE 2 DIABETES MELLITUS WITH HYPERGLYCEMIA, WITHOUT LONG-TERM CURRENT USE OF INSULIN (HCC): ICD-10-CM

## 2017-07-18 DIAGNOSIS — R42 DIZZINESS: ICD-10-CM

## 2017-07-18 RX ORDER — LORAZEPAM 0.5 MG/1
TABLET ORAL AS NEEDED
COMMUNITY
End: 2017-10-19 | Stop reason: SDUPTHER

## 2017-07-18 NOTE — MR AVS SNAPSHOT
Visit Information Date & Time Provider Department Dept. Phone Encounter #  
 7/18/2017  2:20 PM Georgia Wilkerson, [address] [facility] [place] [phone] Follow-up Instructions Return in about 6 months (around 1/18/2018). Follow-up and Disposition History Your Appointments 2/20/2018  1:20 PM  
ESTABLISHED PATIENT with Georgia Wilkerson MD  
[address] [facility] [place] [facility] Appt Note: 6 mo fu $0cp 13[address] [facility] [phone] [id number] [phone]  
  
   
 [address] [facility] Upcoming Health Maintenance Date Due  
 FOOT EXAM Q1 3/20/1942 EYE EXAM RETINAL OR DILATED Q1 3[birth year]20/1942 DTaP/Tdap/Td series (1 - Tdap) 3/20/1953 ZOSTER VACCINE AGE 60> 3/20/1992 GLAUCOMA SCREENING Q2Y 3/20/1997 MICROALBUMIN Q1 6/16/2017 INFLUENZA AGE 9 TO ADULT 8/1/2017 MEDICARE YEARLY EXAM 10/20/2017 HEMOGLOBIN A1C Q6M 10/20/2017 LIPID PANEL Q1 4/20/2018 Allergies as of 7/18/2017  Review Complete On: 7/18/2017 By: Georgia Wilkerson MD  
 No Known Allergies Current Immunizations  Reviewed on 10/19/2016 Name Date Influenza High Dose Vaccine PF 10/19/2016 Influenza Vaccine 10/17/2014, 10/11/2013  9:32 AM  
 Influenza Vaccine (Quad) 10/28/2015 10:28 AM  
 Pneumococcal Conjugate (PCV-13) 10/28/2015 10:28 AM  
 Pneumococcal Polysaccharide (PPSV-23) 10/11/2013  9:33 AM  
  
 Not reviewed this visit You Were Diagnosed With   
  
 Codes Comments Essential hypertension    -  Primary ICD-10-CM: I10 
ICD-9-CM: 401.9 Heart palpitations     ICD-10-CM: R00.2 ICD-9-CM: 785.1 Type 2 diabetes mellitus with hyperglycemia, without long-term current use of insulin (HCC)     ICD-10-CM: E11.65 ICD-9-CM: 250.00, 790.29 Seizure ([address]     ICD-10-CM: R56.9 ICD-9-CM: 780.39 Dizziness     ICD-10-CM: Q36 ICD-9-CM: 780.4 Vitals BP Pulse Resp Height(growth percentile) Weight(growth percentile) SpO2  
 136/64 (BP 1 Location: Right arm, BP Patient Position: Sitting) 60 14 5' 6\" (1.676 m) 181 lb (82.1 kg) 97% BMI Smoking Status 29.21 kg/m2 Current Every Day Smoker BMI and BSA Data Body Mass Index Body Surface Area  
 29.21 kg/m 2 1.96 m 2 Preferred Pharmacy Pharmacy Name Phone [address], [address] [facility]  [address] [facility]. Iona Osler [phone] Your Updated Medication List  
  
   
This list is accurate as of: 7/18/17  3:13 PM.  Always use your most recent med list.  
  
  
  
  
 acetaminophen 500 mg tablet Commonly known as:  TYLENOL Take  by mouth every six (6) hours as needed. aspirin delayed-release 81 mg tablet Take 1 Tab by mouth daily. betamethasone valerate 0.1 % ointment Commonly known as:  Joesphine Hew Apply  to affected area two (2) times a day. cinnamon bark-chromium picolin 500-100 mg-mcg Cap Take 1 Cap by mouth daily. Indications: DIABETES MELLITUS FIBER THERAPY (M-CELL/SUGAR) Powd Generic drug:  methylcellulose (laxative) Take  by mouth. Iron 325 mg (65 mg iron) tablet Generic drug:  ferrous sulfate Take  by mouth Daily (before breakfast). Take 1 every other day * LaMICtal 25 mg tablet Generic drug:  lamoTRIgine Take  by mouth daily. * lamoTRIgine 100 mg tablet Commonly known as: LaMICtal  
Take 100 mg by mouth every twelve (12) hours. LORazepam 0.5 mg tablet Commonly known as:  ATIVAN Take  by mouth as needed for Anxiety. metFORMIN  mg tablet Commonly known as:  GLUCOPHAGE XR  
TAKE 2 TABLETS DAILY pantoprazole 40 mg tablet Commonly known as:  PROTONIX  
TAKE 1 TABLET DAILY pindolol 5 mg tablet Commonly known as:  VISKIN  
TAKE 1 TABLET TWICE A DAY  
  
 pravastatin 20 mg tablet Commonly known as:  PRAVACHOL  
 TAKE 1 TABLET AT BEDTIME  
  
 PROBIOTIC 4X 10-15 mg Tbec Generic drug:  B.infantis-B.ani-B.long-B.bifi Take  by mouth. QUININE SULFATE PO Take 300 mg by mouth as needed. * Notice: This list has 2 medication(s) that are the same as other medications prescribed for you. Read the directions carefully, and ask your doctor or other care provider to review them with you. We Performed the Following AMB POC EKG ROUTINE W/ 12 LEADS, INTER & REP [09405 CPT(R)] Follow-up Instructions Return in about 6 months (around 1/18/2018). Introducing [facility] & HEALTH SERVICES! Dear Chandan Fiore: Thank you for requesting a [employer] account. Our records indicate that you already have an active [employer] account. You can access your account anytime at [email or web address] Did you know that you can access your hospital and ER discharge instructions at any time in [employer]? You can also review all of your test results from your hospital stay or ER visit. Additional Information If you have questions, please visit the Frequently Asked Questions section of the [employer] website at [email or web address] Remember, [employer] is NOT to be used for urgent needs. For medical emergencies, dial 911. Now available from your iPhone and Android! Please provide this summary of care documentation to your next provider. Your primary care clinician is listed as Mattie Leyden. If you have any questions after today's visit, please call [phone].

## 2017-07-18 NOTE — PROGRESS NOTES
PATIENT ID VERIFIED WITH TWO PATIENT IDENTIFIERS. MEDICATION REVIEWED AND APPROVED BY DR. Constance Garcia.

## 2017-07-18 NOTE — PROGRESS NOTES
Abilio Marks is a 80 y.o. male is here for routine f/u. On seizure meds now--seeing Neurology. Occas palpitations/heart fluttering. The patient denies chest pain/ shortness of breath, orthopnea, PND, LE edema,  syncope, presyncope or fatigue. Patient Active Problem List    Diagnosis Date Noted    Seizure [facility])     GERD (gastroesophageal reflux disease) 07/15/2015    Orthostatic hypotension     Hypertension     Pure hypercholesterolemia     [facility]       Misael Myers MD  Past Medical History:   Diagnosis Date    [facility]     DJD (degenerative joint disease)     GERD (gastroesophageal reflux disease)     Hypertension     Orthostatic hypotension     Parasomnia     PUD (peptic ulcer disease)     Pure hypercholesterolemia     Seizure [facility])       Past Surgical History:   Procedure Laterality Date    ENDOSCOPY, COLON, DIAGNOSTIC  01/2012    HX CHOLECYSTECTOMY  02/2012    laparoscopic    HX GI  01/2012    endoscopy,BX    HX KNEE REPLACEMENT      Bilateral     No Known Allergies   Family History   Problem Relation Age of Onset    COPD Father     COPD Sister     COPD Sister       Social History     Social History    Marital status:      Spouse name: N/A    Number of children: N/A    Years of education: N/A     Occupational History    Not on file. Social History Main Topics    Smoking status: Current Every Day Smoker     Types: Cigars    Smokeless tobacco: Never Used    Alcohol use No    Drug use: No    Sexual activity: No     Other Topics Concern    Not on file     Social History Narrative      Current Outpatient Prescriptions   Medication Sig    LORazepam (ATIVAN) 0.5 mg tablet Take  by mouth as needed for Anxiety.     metFORMIN ER (GLUCOPHAGE XR) 500 mg tablet TAKE 2 TABLETS DAILY    pantoprazole (PROTONIX) 40 mg tablet TAKE 1 TABLET DAILY    pindolol (VISKIN) 5 mg tablet TAKE 1 TABLET TWICE A DAY    pravastatin (PRAVACHOL) 20 mg tablet TAKE 1 TABLET AT BEDTIME    lamoTRIgine (LAMICTAL) 100 mg tablet Take 100 mg by mouth every twelve (12) hours.  lamoTRIgine (LAMICTAL) 25 mg tablet Take  by mouth daily.  cinnamon bark-chromium picolin 500-100 mg-mcg cap Take 1 Cap by mouth daily. Indications: DIABETES MELLITUS    aspirin delayed-release 81 mg tablet Take 1 Tab by mouth daily.  QUININE SULFATE PO Take 300 mg by mouth as needed.  ferrous sulfate (IRON) 325 mg (65 mg iron) tablet Take  by mouth Daily (before breakfast). Take 1 every other day    betamethasone valerate (VALISONE) 0.1 % ointment Apply  to affected area two (2) times a day. (Patient taking differently: Apply  to affected area two (2) times daily as needed.)    methylcellulose, laxative, (FIBER THERAPY) Powd Take  by mouth.  acetaminophen (TYLENOL) 500 mg tablet Take  by mouth every six (6) hours as needed.  B.infantis-B.ani-B.long-B.bifi (PROBIOTIC 4X) 10-15 mg TbEC Take  by mouth. No current facility-administered medications for this visit. Review of Symptoms:    CONST  No weight change. No fever, chills, sweats    ENT No visual changes, URI sx, sore throat    CV  See HPI   RESP  No cough, or sputum, wheezing. Also see HPI   GI  No abdominal pain or change in bowel habits. No heartburn or dysphagia. No melena or rectal bleeding.   No dysuria, urgency, frequency, hematuria   MSKEL  No joint pain, swelling. No muscle pain. SKIN  No rash or lesions. NEURO  No headache, syncope, or seizure. No weakness, loss of sensation, or paresthesias. PSYCH  No low mood or depression  No anxiety. HE/LYMPH  No easy bruising, abnormal bleeding, or enlarged glands.         Physical ExamPhysical Exam:    Visit Vitals    /64 (BP 1 Location: Right arm, BP Patient Position: Sitting)    Pulse 60    Resp 14    Ht 5' 6\" (1.676 m)    Wt 181 lb (82.1 kg)    SpO2 97%    BMI 29.21 kg/m2     Gen: NAD  HEENT: PERRL, throat clear  Neck: no adenopathy, no thyromegaly, no JVD   Heart:  Regular,Nl S1S2,  no murmur, gallop or rub.   Lungs:  clear  Abdomen:   Soft, non-tender, bowel sounds are active.   Extremities:  No edema  Pulse: symmetric  Neuro: A&O times 3, No focal neuro deficits    Cardiographics    ECG: NSR 56, wnl    CARDIAC TESTING;    Carotid Dopplers 2/4/16--minimal (<15%) LUCIEN plaque, 0% Left    Holter- 2/2/16--NSR, isolated PAC's/PVC's    Echo 2/4/16--Normal LV size/walls, LVEF 65-70, D1, trace to mild MR, RVSP <35      Labs:   Lab Results   Component Value Date/Time    Sodium 142 04/20/2017 08:24 AM    Sodium 141 12/21/2016 08:18 AM    Sodium 143 06/16/2016 08:58 AM    Sodium 140 02/16/2016 09:07 AM    Sodium 142 10/28/2015 10:18 AM    Potassium 5.1 04/20/2017 08:24 AM    Potassium 4.9 12/21/2016 08:18 AM    Potassium 4.8 06/16/2016 08:58 AM    Potassium 5.1 02/16/2016 09:07 AM    Potassium 4.8 10/28/2015 10:18 AM    Chloride 101 04/20/2017 08:24 AM    Chloride 100 12/21/2016 08:18 AM    Chloride 103 06/16/2016 08:58 AM    Chloride 101 02/16/2016 09:07 AM    Chloride 101 10/28/2015 10:18 AM    CO2 22 04/20/2017 08:24 AM    CO2 25 12/21/2016 08:18 AM    CO2 25 06/16/2016 08:58 AM    CO2 24 02/16/2016 09:07 AM    CO2 26 10/28/2015 10:18 AM    Glucose 286 04/20/2017 08:24 AM    Glucose 184 12/21/2016 08:18 AM    Glucose 195 06/16/2016 08:58 AM    Glucose 342 02/16/2016 09:07 AM    Glucose 129 10/28/2015 10:18 AM    BUN 12 04/20/2017 08:24 AM    BUN 25 12/21/2016 08:18 AM    BUN 12 06/16/2016 08:58 AM    BUN 15 02/16/2016 09:07 AM    BUN 12 10/28/2015 10:18 AM    Creatinine 1.20 04/20/2017 08:24 AM    Creatinine 1.38 12/21/2016 08:18 AM    Creatinine 1.00 06/16/2016 08:58 AM    Creatinine 1.27 02/16/2016 09:07 AM    Creatinine 1.24 10/28/2015 10:18 AM    BUN/Creatinine ratio 10 04/20/2017 08:24 AM    BUN/Creatinine ratio 18 12/21/2016 08:18 AM    BUN/Creatinine ratio 12 06/16/2016 08:58 AM    BUN/Creatinine ratio 12 02/16/2016 09:07 AM    BUN/Creatinine ratio 10 10/28/2015 10:18 AM    GFR est AA 63 04/20/2017 08:24 AM    GFR est AA 54 12/21/2016 08:18 AM    GFR est AA 80 06/16/2016 08:58 AM    GFR est AA 60 02/16/2016 09:07 AM    GFR est AA 62 10/28/2015 10:18 AM    GFR est non-AA 55 04/20/2017 08:24 AM    GFR est non-AA 47 12/21/2016 08:18 AM    GFR est non-AA 69 06/16/2016 08:58 AM    GFR est non-AA 52 02/16/2016 09:07 AM    GFR est non-AA 53 10/28/2015 10:18 AM    Calcium 8.7 04/20/2017 08:24 AM    Calcium 8.6 12/21/2016 08:18 AM    Calcium 8.7 06/16/2016 08:58 AM    Calcium 8.7 02/16/2016 09:07 AM    Calcium 9.2 10/28/2015 10:18 AM    Bilirubin, total 0.3 04/20/2017 08:24 AM    Bilirubin, total 0.7 12/21/2016 08:18 AM    Bilirubin, total 0.3 06/16/2016 08:58 AM    Bilirubin, total 0.3 02/16/2016 09:07 AM    AST (SGOT) 25 04/20/2017 08:24 AM    AST (SGOT) 28 12/21/2016 08:18 AM    AST (SGOT) 22 06/16/2016 08:58 AM    AST (SGOT) 29 02/16/2016 09:07 AM    AST (SGOT) 23 10/28/2015 10:18 AM    Alk. phosphatase 107 04/20/2017 08:24 AM    Alk. phosphatase 90 12/21/2016 08:18 AM    Alk. phosphatase 97 06/16/2016 08:58 AM    Alk.  phosphatase 118 02/16/2016 09:07 AM    Protein, total 6.2 04/20/2017 08:24 AM    Protein, total 6.3 12/21/2016 08:18 AM    Protein, total 6.2 06/16/2016 08:58 AM    Protein, total 6.3 02/16/2016 09:07 AM    Albumin 4.1 04/20/2017 08:24 AM    Albumin 4.1 12/21/2016 08:18 AM    Albumin 4.0 06/16/2016 08:58 AM    Albumin 3.8 02/16/2016 09:07 AM    A-G Ratio 2.0 04/20/2017 08:24 AM    A-G Ratio 1.9 12/21/2016 08:18 AM    A-G Ratio 1.8 06/16/2016 08:58 AM    A-G Ratio 1.5 02/16/2016 09:07 AM    ALT (SGPT) 20 04/20/2017 08:24 AM    ALT (SGPT) 17 12/21/2016 08:18 AM    ALT (SGPT) 18 06/16/2016 08:58 AM    ALT (SGPT) 38 02/16/2016 09:07 AM    ALT (SGPT) 19 10/17/2014 10:21 AM     No results found for: CPK, CPKX, CPX  Lab Results   Component Value Date/Time    Cholesterol, total 142 04/20/2017 08:24 AM Cholesterol, total 134 12/21/2016 08:18 AM    Cholesterol, total 133 06/16/2016 08:58 AM    Cholesterol, total 133 02/16/2016 09:07 AM    Cholesterol, total 157 10/28/2015 10:18 AM    HDL Cholesterol 39 04/20/2017 08:24 AM    HDL Cholesterol 44 12/21/2016 08:18 AM    HDL Cholesterol 38 06/16/2016 08:58 AM    HDL Cholesterol 21 02/16/2016 09:07 AM    HDL Cholesterol 36 10/28/2015 10:18 AM    LDL, calculated 70 04/20/2017 08:24 AM    LDL, calculated 77 12/21/2016 08:18 AM    LDL, calculated 74 06/16/2016 08:58 AM    LDL, calculated 86 02/16/2016 09:07 AM    LDL, calculated 84 10/28/2015 10:18 AM    Triglyceride 164 04/20/2017 08:24 AM    Triglyceride 66 12/21/2016 08:18 AM    Triglyceride 103 06/16/2016 08:58 AM    Triglyceride 132 02/16/2016 09:07 AM    Triglyceride 185 10/28/2015 10:18 AM     No results found for this or any previous visit. Assessment:         Patient Active Problem List    Diagnosis Date Noted    [facility]     GERD (gastroesophageal reflux disease) 07/15/2015    Orthostatic hypotension     Hypertension     Pure hypercholesterolemia     Diabetes mellitus, type 2 ([facility]       On seizure meds now--seeing Neurology. Occas palpitations/heart fluttering. Cardiac w/u last year (Echo, Holter, etc)--benign     Plan:     Doing well with no adverse cardiac symptoms. Lipids and labs followed by PCP. Continue current care and f/u in 6 months.     Rashmi Carlton MD

## 2017-07-20 NOTE — TELEPHONE ENCOUNTER
Patient says he needs a new glucose meter, test strips and monitoring kit. His readings are off and he thinks his meter isn't accurate. He notes he tests twice daily (BID). He has plenty of lancets. [employer].

## 2017-07-21 RX ORDER — INSULIN PUMP SYRINGE, 3 ML
EACH MISCELLANEOUS
Qty: 1 KIT | Refills: 0 | Status: SHIPPED | OUTPATIENT
Start: 2017-07-21

## 2017-08-03 RX ORDER — PRAVASTATIN SODIUM 20 MG/1
20 TABLET ORAL
Qty: 90 TAB | Refills: 4 | Status: SHIPPED | OUTPATIENT
Start: 2017-08-03 | End: 2018-11-28 | Stop reason: SDUPTHER

## 2017-08-28 RX ORDER — PINDOLOL 5 MG/1
TABLET ORAL
Qty: 180 TAB | Refills: 1 | Status: SHIPPED | OUTPATIENT
Start: 2017-08-28 | End: 2018-02-24 | Stop reason: SDUPTHER

## 2017-10-19 ENCOUNTER — OFFICE VISIT (OUTPATIENT)
Dept: FAMILY MEDICINE CLINIC | Age: 82
End: 2017-10-19

## 2017-10-19 VITALS
WEIGHT: 176.6 LBS | SYSTOLIC BLOOD PRESSURE: 120 MMHG | OXYGEN SATURATION: 100 % | HEIGHT: 66 IN | DIASTOLIC BLOOD PRESSURE: 60 MMHG | HEART RATE: 68 BPM | TEMPERATURE: 98.5 F | RESPIRATION RATE: 16 BRPM | BODY MASS INDEX: 28.38 KG/M2

## 2017-10-19 DIAGNOSIS — Z23 ENCOUNTER FOR IMMUNIZATION: ICD-10-CM

## 2017-10-19 DIAGNOSIS — Z00.00 MEDICARE ANNUAL WELLNESS VISIT, SUBSEQUENT: Primary | ICD-10-CM

## 2017-10-19 DIAGNOSIS — R56.9 SEIZURE (HCC): ICD-10-CM

## 2017-10-19 DIAGNOSIS — I10 ESSENTIAL HYPERTENSION: ICD-10-CM

## 2017-10-19 DIAGNOSIS — E11.65 TYPE 2 DIABETES MELLITUS WITH HYPERGLYCEMIA, WITHOUT LONG-TERM CURRENT USE OF INSULIN (HCC): ICD-10-CM

## 2017-10-19 RX ORDER — LORAZEPAM 0.5 MG/1
0.5 TABLET ORAL
Qty: 20 TAB | Refills: 2 | Status: SHIPPED | OUTPATIENT
Start: 2017-10-19 | End: 2018-01-23 | Stop reason: ALTCHOICE

## 2017-10-19 NOTE — ACP (ADVANCE CARE PLANNING)
In the event that he is unable to speak for himself, please contact Katie Dimas at [phone]    Britney Lin

## 2017-10-19 NOTE — PROGRESS NOTES
Chief Complaint   Patient presents with    Elevated Blood Pressure         HPI:      Mr Niki Zaragoza is a very pleasant 81 yo retired [employer] [facility] (21 yrs) who has T2DM, mixed hyperlipidemia, and orthostatic hypotension. Dec 2016 A1c = 6.8. Managed with Metformin monotherapy. Dr Ashli Bautista follows for orthostatic hypotension. Latest LDL = 77 (Pravastatin). Neurologically evaluated and noted to have a firm diagnosis of Essential Tremor. Also taking meds for suspected sx disorder. Last seen by TONE Sotelo, who initiated Lamictal in Sept 2016 and scheduled Mr Niki Zaragoza for a one year follow up. New Issues:  Has just cut 57 trees [place] his property this year. Home glucose runs in the 120-200 range and he notes that this is very exercise dependent. No Known Allergies    Current Outpatient Prescriptions   Medication Sig    metFORMIN ER (GLUCOPHAGE XR) 500 mg tablet TAKE 2 TABLETS DAILY    pantoprazole (PROTONIX) 40 mg tablet TAKE 1 TABLET DAILY    pindolol (VISKIN) 5 mg tablet TAKE 1 TABLET TWICE A DAY    pravastatin (PRAVACHOL) 20 mg tablet TAKE 1 TABLET AT BEDTIME    levETIRAcetam (KEPPRA) 250 mg tablet Take 1 Tab by mouth two (2) times a day. (Patient taking differently: Take 250 mg by mouth nightly.)    lamoTRIgine (LAMICTAL) 100 mg tablet Take 100 mg by mouth every twelve (12) hours.  lamoTRIgine (LAMICTAL) 25 mg tablet Take  by mouth daily.  cinnamon bark-chromium picolin 500-100 mg-mcg cap Take 1 Cap by mouth daily. Indications: DIABETES MELLITUS    aspirin delayed-release 81 mg tablet Take 1 Tab by mouth daily.  ferrous sulfate (IRON) 325 mg (65 mg iron) tablet Take  by mouth Daily (before breakfast). Take 1 every other day    betamethasone valerate (VALISONE) 0.1 % ointment Apply  to affected area two (2) times a day. (Patient taking differently: Apply  to affected area two (2) times daily as needed.)    methylcellulose, laxative, (FIBER THERAPY) Powd Take  by mouth.     B.infantis-B.ani-B.long-B.bifi (PROBIOTIC 4X) 10-15 mg TbEC Take  by mouth.  QUININE SULFATE PO Take 300 mg by mouth as needed.  acetaminophen (TYLENOL) 500 mg tablet Take  by mouth every six (6) hours as needed. No current facility-administered medications for this visit. Past Medical History:   Diagnosis Date    [facility] ([facility]     DJD (degenerative joint disease)     GERD (gastroesophageal reflux disease)     Hypertension     Orthostatic hypotension     Parasomnia     PUD (peptic ulcer disease)     Pure hypercholesterolemia     Seizure ([facility]          ROS:  Denies fever, chills, cough, chest pain, SOB,  nausea, vomiting, or diarrhea. Denies wt loss, wt gain, hemoptysis, hematochezia or melena. Physical Examination:    /60  Pulse 68  Temp 98.5 °F (36.9 °C)  Resp 16  Ht 5' 6\" (1.676 m)  Wt 176 lb 9.6 oz (80.1 kg)  SpO2 100%  BMI 28.5 kg/m2    General: Alert and Ox3, Fluent speech  HEENT:  NC/AT, EOMI, OP: clear  Neck:  Supple, no adenopathy, JVD, mass or bruit  Chest:  Clear to Ausculation, without wheezes, rales, rubs or ronchi  Cardiac: RRR  Abdomen:  +BS, soft, nontender without palpable HSM  Extremities:  No cyanosis, clubbing or edema  Neurologic:  Ambulatory without assist, CN 2-12 grossly intact. Moves all extremities. Skin: no rash  Lymphadenopathy: no cervical or supraclavicular nodes  Diabetic Foot Exam:  Both feet are examined and demonstrate intact DP pulses. There is good capillary refill and sensation is intact. Skin is intact and there are no ulcers or sores. ASSESSMENT AND PLAN:     1. T2DM:  Labs today to assess degree of control. Foot care and hypoglycemic teaching  2. HTN:  excellent  3. Sz:  Checking levels. 4.  RTC in 6 months    No orders of the defined types were placed in this encounter.       Makayla Lopez MD, FACP        ______________________________________________________________________    Bibi rogers a 80 y.o. male and presents for annual Medicare Wellness Visit. Problem List: Reviewed with patient and discussed risk factors. Patient Active Problem List   Diagnosis Code    Hypertension I10    Pure hypercholesterolemia E78.00    Orthostatic hypotension I95.1    GERD (gastroesophageal reflux disease) K21.9    Seizure ([address] R56.9    Type 2 diabetes mellitus with hyperglycemia, without long-term current use of insulin (HCC) E11.65       Current medical providers:  Patient Care Team:  Agustín Godwin MD as PCP - General (Internal Medicine)  Lori Mcbride MD (Orthopedic Surgery)  Alan Cote MD (Cardiology)    [facility], [facility], Medications/Allergies: reviewed, on chart. Male Alcohol Screening: On any occasion during the past 3 months, have you had more than 4 drinks containing alcohol? No    Do you average more than 14 drinks per week? No    ROS:  Constitutional: No fever, chills or weight loss  Respiratory: No cough, SOB   CV: No chest pain or Palpitations    Objective:  Visit Vitals    /60    Pulse 68    Temp 98.5 °F (36.9 °C)    Resp 16    Ht 5' 6\" (1.676 m)    Wt 176 lb 9.6 oz (80.1 kg)    SpO2 100%    BMI 28.5 kg/m2    Body mass index is 28.5 kg/(m^2). Assessment of cognitive impairment: Alert and oriented x 3    Depression Screen:   PHQ over the last two weeks 10/19/2017   Little interest or pleasure in doing things Not at all   Feeling down, depressed or hopeless Not at all   Total Score PHQ 2 0       Fall Risk Assessment:    Fall Risk Assessment, last 12 mths 10/19/2017   Able to walk? Yes   Fall in past 12 months? No   Fall with injury? -   Number of falls in past 12 months -   Fall Risk Score -       Functional Ability:   Does the patient exhibit a steady gait? yes   How long did it take the patient to get up and walk from a sitting position?  12 sec   Is the patient self reliant?  (ie can do own laundry, meals, household chores)  yes     Does the patient handle his/her own medications? yes     Does the patient handle his/her own money? yes     Is the patients home safe (ie good lighting, handrails on stairs and bath, etc.)? yes     Did you notice or did patient express any hearing difficulties? yes     Did you notice or did patient express any vision difficulties? no       Advance Care Planning:   Patient was offered the opportunity to discuss advance care planning:  yes     Does patient have an Advance Directive:  yes   If no, did you provide information on Caring Connections?  no       Plan:      Orders Placed This Encounter    INFLUENZA VIRUS VACCINE, HIGH DOSE SEASONAL, PRESERVATIVE FREE    LIPID PANEL    METABOLIC PANEL, COMPREHENSIVE    HEMOGLOBIN A1C WITH EAG    CBC WITH AUTOMATED DIFF    TSH 3RD GENERATION    LEVETIRACETAM (KEPPRA)    LAMOTRIGINE (LAMICTAL)    MICROALBUMIN, UR, RAND W/ MICROALBUMIN/CREA RATIO    HM DIABETES FOOT EXAM    ADMIN INFLUENZA VIRUS [address] Maintenance   Topic Date Due    FOOT EXAM Q1  03/20/1942    EYE EXAM RETINAL OR DILATED Q1  03/20/1942    GLAUCOMA SCREENING Q2Y  03/20/1997    MICROALBUMIN Q1  06/16/2017    INFLUENZA AGE 9 TO ADULT  08/01/2017    HEMOGLOBIN A1C Q6M  10/20/2017    MEDICARE YEARLY EXAM  10/20/2017    LIPID PANEL Q1  04/20/2018    DTaP/Tdap/Td series (2 - Td) 10/19/2027    ZOSTER VACCINE AGE 60>  Addressed    Pneumococcal 65+ Low/Medium Risk  Completed       *Patient verbalized understanding and agreement with the plan. A copy of the After Visit Summary with personalized health plan was given to the patient today.

## 2017-10-19 NOTE — MR AVS SNAPSHOT
Visit Information Date & Time Provider Department Dept. Phone Encounter #  
 10/19/2017  8:30 AM Norma MaxwellFawad [facility] [phone] [id number] Follow-up Instructions Return in about 6 months (around 4/19/2018). Your Appointments 2/20/2018  1:20 PM  
ESTABLISHED PATIENT with Maurilio Fiore MD  
[address] [facility] [place] [address]) Appt Note: 6 mo fu $0cp [address] [facility] [address] [id number] [phone]  
  
   
 [address] [place] [address] Upcoming Health Maintenance Date Due  
 FOOT EXAM Q1 3/20/1942 EYE EXAM RETINAL OR DILATED Q1 3/20/1942 GLAUCOMA SCREENING Q2Y 3/20/1997 MICROALBUMIN Q1 6/16/2017 INFLUENZA AGE 9 TO ADULT 8/1/2017 HEMOGLOBIN A1C Q6M 10/20/2017 MEDICARE YEARLY EXAM 10/20/2017 LIPID PANEL Q1 4/20/2018 DTaP/Tdap/Td series (2 - Td) 10/19/2027 Allergies as of 10/19/2017  Review Complete On: 10/19/2017 By: Norma Maxwell MD  
 No Known Allergies Current Immunizations  Reviewed on 10/19/2016 Name Date Influenza High Dose Vaccine PF 10/19/2017, 10/19/2016 Influenza Vaccine 10/17/2014, 10/11/2013  9:32 AM  
 Influenza Vaccine (Quad) 10/28/2015 10:28 AM  
 Pneumococcal Conjugate (PCV-13) 10/28/2015 10:28 AM  
 Pneumococcal Polysaccharide (PPSV-23) 10/11/2013  9:33 AM  
  
 Not reviewed this visit You Were Diagnosed With   
  
 Codes Comments Medicare annual wellness visit, subsequent    -  Primary ICD-10-CM: Z00.00 ICD-9-CM: V70.0 Type 2 diabetes mellitus with hyperglycemia, without long-term current use of insulin (HCC)     ICD-10-CM: E11.65 ICD-9-CM: 250.00, 790.29 Essential hypertension     ICD-10-CM: I10 
ICD-9-CM: 401.9 Seizure ([address]     ICD-10-CM: R56.9 ICD-9-CM: 780.39 Encounter for immunization     ICD-10-CM: A55 ICD-9-CM: V03.89 Vitals BP Pulse Temp Resp Height(growth percentile) Weight(growth percentile) 120/60 68 98.5 °F (36.9 °C) 16 5' 6\" (1.676 m) 176 lb 9.6 oz (80.1 kg) SpO2 BMI Smoking Status 100% 28.5 kg/m2 Current Every Day Smoker BMI and BSA Data Body Mass Index Body Surface Area 28.5 kg/m 2 1.93 m 2 Preferred Pharmacy Pharmacy Name Phone [address] [facility] [phone] Your Updated Medication List  
  
   
This list is accurate as of: 10/19/17  9:20 AM.  Always use your most recent med list.  
  
  
  
  
 acetaminophen 500 mg tablet Commonly known as:  TYLENOL Take  by mouth every six (6) hours as needed. aspirin delayed-release 81 mg tablet Take 1 Tab by mouth daily. betamethasone valerate 0.1 % ointment Commonly known as:  Bony Knows Apply  to affected area two (2) times a day. Blood-Glucose Meter monitoring kit Test blood sugar daily. Dx. E11.65, not on insulin  
  
 cinnamon bark-chromium picolin 500-100 mg-mcg Cap Take 1 Cap by mouth daily. Indications: DIABETES MELLITUS FIBER THERAPY (M-CELL/SUGAR) Powd Generic drug:  methylcellulose (laxative) Take  by mouth. glucose blood VI test strips strip Commonly known as:  blood glucose test  
Test blood sugar daily. Dx E11.65 not on insulin Iron 325 mg (65 mg iron) tablet Generic drug:  ferrous sulfate Take  by mouth Daily (before breakfast). Take 1 every other day * LaMICtal 25 mg tablet Generic drug:  lamoTRIgine Take  by mouth daily. * lamoTRIgine 100 mg tablet Commonly known as: LaMICtal  
Take 50 mg by mouth daily. Taking 50 mg daily LORazepam 0.5 mg tablet Commonly known as:  ATIVAN Take  by mouth as needed for Anxiety. metFORMIN  mg tablet Commonly known as:  GLUCOPHAGE XR  
TAKE 2 TABLETS DAILY pantoprazole 40 mg tablet Commonly known as:  PROTONIX TAKE 1 TABLET DAILY pindolol 5 mg tablet Commonly known as:  VISKIN  
TAKE 1 TABLET TWICE A DAY  
  
 pravastatin 20 mg tablet Commonly known as:  PRAVACHOL Take 1 Tab by mouth nightly. PROBIOTIC 4X 10-15 mg Tbec Generic drug:  B.infantis-B.ani-B.long-B.bifi Take  by mouth. QUININE SULFATE PO Take 300 mg by mouth as needed. * Notice: This list has 2 medication(s) that are the same as other medications prescribed for you. Read the directions carefully, and ask your doctor or other care provider to review them with you. We Performed the Following ADMIN INFLUENZA VIRUS VAC [ HCPCS] CBC WITH AUTOMATED DIFF [66314 CPT(R)] HEMOGLOBIN A1C WITH EAG [88314 CPT(R)]  DIABETES FOOT EXAM [HM7 Custom] INFLUENZA VIRUS VACCINE, HIGH DOSE SEASONAL, PRESERVATIVE FREE [40895 CPT(R)] LAMOTRIGINE (LAMICTAL) [16606 CPT(R)] LEVETIRACETAM (KEPPRA) [id number] CPT(R)] LIPID PANEL [34380 CPT(R)] METABOLIC PANEL, COMPREHENSIVE [55745 CPT(R)] MICROALBUMIN, UR, RAND W/ MICROALBUMIN/CREA RATIO [id number] CPT(R)] TSH 3RD GENERATION [55860 CPT(R)] Follow-up Instructions Return in about 6 months (around 4/19/2018). Introducing [facility]! Dear Khalida Maynard: Thank you for requesting a [employer] account. Our records indicate that you already have an active [employer] account. You can access your account anytime at [email or web address] Did you know that you can access your hospital and ER discharge instructions at any time in [employer]? You can also review all of your test results from your hospital stay or ER visit. Additional Information If you have questions, please visit the Frequently Asked Questions section of the [employer] website at [email or web address] Remember, [employer] is NOT to be used for urgent needs. For medical emergencies, dial 911. Now available from your iPhone and Android! Please provide this summary of care documentation to your next provider. Your primary care clinician is listed as Alexa Moreno. If you have any questions after today's visit, please call [phone].

## 2017-10-23 LAB
ALBUMIN SERPL-MCNC: 4.3 G/DL (ref 3.5–4.7)
ALBUMIN/CREAT UR: 3.9 MG/G CREAT (ref 0–30)
ALBUMIN/GLOB SERPL: 1.8 {RATIO} (ref 1.2–2.2)
ALP SERPL-CCNC: 110 IU/L (ref 39–117)
ALT SERPL-CCNC: 20 IU/L (ref 0–44)
AST SERPL-CCNC: 23 IU/L (ref 0–40)
BASOPHILS # BLD AUTO: 0 X10E3/UL (ref 0–0.2)
BASOPHILS NFR BLD AUTO: 0 %
BILIRUB SERPL-MCNC: 0.3 MG/DL (ref 0–1.2)
BUN SERPL-MCNC: 15 MG/DL (ref 8–27)
BUN/CREAT SERPL: 14 (ref 10–24)
CALCIUM SERPL-MCNC: 9.1 MG/DL (ref 8.6–10.2)
CHLORIDE SERPL-SCNC: 101 MMOL/L (ref 96–106)
CHOLEST SERPL-MCNC: 149 MG/DL (ref 100–199)
CO2 SERPL-SCNC: 26 MMOL/L (ref 18–29)
CREAT SERPL-MCNC: 1.08 MG/DL (ref 0.76–1.27)
CREAT UR-MCNC: 80.2 MG/DL
EOSINOPHIL # BLD AUTO: 0.2 X10E3/UL (ref 0–0.4)
EOSINOPHIL NFR BLD AUTO: 4 %
ERYTHROCYTE [DISTWIDTH] IN BLOOD BY AUTOMATED COUNT: 13.1 % (ref 12.3–15.4)
EST. AVERAGE GLUCOSE BLD GHB EST-MCNC: 157 MG/DL
GFR SERPLBLD CREATININE-BSD FMLA CKD-EPI: 62 ML/MIN/1.73
GFR SERPLBLD CREATININE-BSD FMLA CKD-EPI: 72 ML/MIN/1.73
GLOBULIN SER CALC-MCNC: 2.4 G/DL (ref 1.5–4.5)
GLUCOSE SERPL-MCNC: 230 MG/DL (ref 65–99)
HBA1C MFR BLD: 7.1 % (ref 4.8–5.6)
HCT VFR BLD AUTO: 43.2 % (ref 37.5–51)
HDLC SERPL-MCNC: 43 MG/DL
HGB BLD-MCNC: 14.6 G/DL (ref 12.6–17.7)
IMM GRANULOCYTES # BLD: 0 X10E3/UL (ref 0–0.1)
IMM GRANULOCYTES NFR BLD: 0 %
LAMOTRIGINE SERPL-MCNC: NORMAL UG/ML (ref 2–20)
LDLC SERPL CALC-MCNC: 79 MG/DL (ref 0–99)
LEVETIRACETAM SERPL-MCNC: NORMAL UG/ML (ref 10–40)
LYMPHOCYTES # BLD AUTO: 1.4 X10E3/UL (ref 0.7–3.1)
LYMPHOCYTES NFR BLD AUTO: 26 %
MCH RBC QN AUTO: 30.2 PG (ref 26.6–33)
MCHC RBC AUTO-ENTMCNC: 33.8 G/DL (ref 31.5–35.7)
MCV RBC AUTO: 89 FL (ref 79–97)
MICROALBUMIN UR-MCNC: 3.1 UG/ML
MONOCYTES # BLD AUTO: 0.5 X10E3/UL (ref 0.1–0.9)
MONOCYTES NFR BLD AUTO: 9 %
NEUTROPHILS # BLD AUTO: 3.4 X10E3/UL (ref 1.4–7)
NEUTROPHILS NFR BLD AUTO: 61 %
PLATELET # BLD AUTO: 182 X10E3/UL (ref 150–379)
POTASSIUM SERPL-SCNC: 4.8 MMOL/L (ref 3.5–5.2)
PROT SERPL-MCNC: 6.7 G/DL (ref 6–8.5)
RBC # BLD AUTO: 4.84 X10E6/UL (ref 4.14–5.8)
SODIUM SERPL-SCNC: 143 MMOL/L (ref 134–144)
TRIGL SERPL-MCNC: 137 MG/DL (ref 0–149)
TSH SERPL DL<=0.005 MIU/L-ACNC: 3.13 UIU/ML (ref 0.45–4.5)
VLDLC SERPL CALC-MCNC: 27 MG/DL (ref 5–40)
WBC # BLD AUTO: 5.5 X10E3/UL (ref 3.4–10.8)

## 2017-12-09 RX ORDER — METFORMIN HYDROCHLORIDE 500 MG/1
TABLET, EXTENDED RELEASE ORAL
Qty: 180 TAB | Refills: 1 | Status: SHIPPED | OUTPATIENT
Start: 2017-12-09 | End: 2018-05-20 | Stop reason: SDUPTHER

## 2017-12-13 RX ORDER — PANTOPRAZOLE SODIUM 40 MG/1
TABLET, DELAYED RELEASE ORAL
Qty: 90 TAB | Refills: 1 | Status: SHIPPED | OUTPATIENT
Start: 2017-12-13 | End: 2018-05-24 | Stop reason: SDUPTHER

## 2018-01-23 ENCOUNTER — OFFICE VISIT (OUTPATIENT)
Dept: FAMILY MEDICINE CLINIC | Age: 83
End: 2018-01-23

## 2018-01-23 VITALS
HEART RATE: 72 BPM | SYSTOLIC BLOOD PRESSURE: 116 MMHG | DIASTOLIC BLOOD PRESSURE: 60 MMHG | HEIGHT: 66 IN | RESPIRATION RATE: 16 BRPM | BODY MASS INDEX: 28.77 KG/M2 | TEMPERATURE: 98.9 F | OXYGEN SATURATION: 98 % | WEIGHT: 179 LBS

## 2018-01-23 DIAGNOSIS — E11.65 TYPE 2 DIABETES MELLITUS WITH HYPERGLYCEMIA, WITHOUT LONG-TERM CURRENT USE OF INSULIN (HCC): Primary | ICD-10-CM

## 2018-01-23 DIAGNOSIS — I95.1 ORTHOSTATIC HYPOTENSION: ICD-10-CM

## 2018-01-23 DIAGNOSIS — I10 ESSENTIAL HYPERTENSION: ICD-10-CM

## 2018-01-23 NOTE — MR AVS SNAPSHOT
[address] [facility] 
 
 
 [address] 
[phone] Patient: Savannah Billy MRN: [id number] BTA:1/80/[id number] Visit Information Date & Time Provider Department Dept. Phone Encounter #  
 1/23/2018  8:30 AM Dimitri Queen, [address] [phone] [id number] Follow-up Instructions Return in about 6 months (around 7/23/2018). Follow-up and Disposition History Your Appointments 2/20/2018  1:20 PM  
ESTABLISHED PATIENT with Victor M Lopez MD  
[address] [place] [facility] Appt Note: 6 mo fu $0cp 13[address] [facility] 67 [address] [phone]  
  
   
 [address] [place] [address] [address] [address] 7/23/2018  9:30 AM  
ESTABLISHED PATIENT with Dimitri Queen MD  
[address] ([facility] Appt Note: 6 mo F/U; 6 mo F/U  
 [address] [place] [id number]  
[address] [facility] [address] [place] [address] Upcoming Health Maintenance Date Due  
 EYE EXAM RETINAL OR DILATED Q1 3/20/1942 GLAUCOMA SCREENING Q2Y 3/20/1997 HEMOGLOBIN A1C Q6M 4/19/2018 MICROALBUMIN Q1 10/19/2018 LIPID PANEL Q1 10/19/2018 MEDICARE YEARLY EXAM 10/20/2018 FOOT EXAM Q1 10/30/2018 DTaP/Tdap/Td series (2 - Td) 10/19/2027 Allergies as of 1/23/2018  Review Complete On: 1/23/2018 By: Dimitri Queen MD  
 No Known Allergies Current Immunizations  Reviewed on 10/19/2016 Name Date Influenza High Dose Vaccine PF 10/19/2017, 10/19/2016 Influenza Vaccine 10/17/2014, 10/11/2013  9:32 AM  
 Influenza Vaccine (Quad) 10/28/2015 10:28 AM  
 Pneumococcal Conjugate (PCV-13) 10/28/2015 10:28 AM  
 Pneumococcal Polysaccharide (PPSV-23) 10/11/2013  9:33 AM  
  
 Not reviewed this visit You Were Diagnosed With   
  
 Codes Comments Type 2 diabetes mellitus with hyperglycemia, without long-term current use of insulin ([facility])    -  Primary ICD-10-CM: E11.65 ICD-9-CM: 250.00, 790.29 Orthostatic hypotension     ICD-10-CM: I95.1 ICD-9-CM: 458.0 Essential hypertension     ICD-10-CM: I10 
ICD-9-CM: 401.9 Vitals BP Pulse Temp Resp Height(growth percentile) Weight(growth percentile) 116/60 (BP 1 Location: Left arm, BP Patient Position: Sitting) 72 98.9 °F (37.2 °C) (Oral) 16 5' 6\" (1.676 m) 179 lb (81.2 kg) SpO2 BMI Smoking Status 98% 28.89 kg/m2 Current Every Day Smoker BMI and BSA Data Body Mass Index Body Surface Area  
 28.89 kg/m 2 1.94 m 2 Preferred Pharmacy Pharmacy Name Phone [address] [facility] [facility] [phone] Your Updated Medication List  
  
   
This list is accurate as of: 1/23/18 11:33 AM.  Always use your most recent med list.  
  
  
  
  
 acetaminophen 500 mg tablet Commonly known as:  TYLENOL Take  by mouth every six (6) hours as needed. aspirin delayed-release 81 mg tablet Take 1 Tab by mouth daily. betamethasone valerate 0.1 % ointment Commonly known as:  [place] Apply  to affected area two (2) times a day. Blood-Glucose Meter monitoring kit Test blood sugar daily. Dx. E11.65, not on insulin  
  
 cinnamon bark-chromium picolin 500-100 mg-mcg Cap Take 1 Cap by mouth daily. Indications: DIABETES MELLITUS FIBER THERAPY (M-CELL/SUGAR) Powd Generic drug:  methylcellulose (laxative) Take  by mouth. glucose blood VI test strips strip Commonly known as:  blood glucose test  
Test blood sugar daily. Dx E11.65 not on insulin Iron 325 mg (65 mg iron) tablet Generic drug:  ferrous sulfate Take  by mouth Daily (before breakfast). Take 1 every other day  
  
 metFORMIN  mg tablet Commonly known as:  GLUCOPHAGE XR  
TAKE 2 TABLETS DAILY pantoprazole 40 mg tablet Commonly known as:  PROTONIX  
TAKE 1 TABLET DAILY pindolol 5 mg tablet Commonly known as:  VISKIN  
TAKE 1 TABLET TWICE A DAY  
  
 pravastatin 20 mg tablet Commonly known as:  PRAVACHOL Take 1 Tab by mouth nightly. PROBIOTIC 4X 10-15 mg Tbec Generic drug:  B.infantis-B.ani-B.long-B.bifi Take  by mouth. QUININE SULFATE PO Take 300 mg by mouth as needed. We Performed the Following CBC WITH AUTOMATED DIFF [91645 CPT(R)] HEMOGLOBIN A1C WITH EAG [80293 CPT(R)] LIPID PANEL [75496 CPT(R)] METABOLIC PANEL, COMPREHENSIVE [23644 CPT(R)] MICROALBUMIN, UR, RAND W/ MICROALBUMIN/CREA RATIO [id number] CPT(R)] Follow-up Instructions Return in about 6 months (around 7/23/2018). Introducing [facility] & HEALTH SERVICES! Dear Yulissa Alberto: Thank you for requesting a [facility] account. Our records indicate that you already have an active [facility] account. You can access your account anytime at [email or web address] Did you know that you can access your hospital and ER discharge instructions at any time in [facility]? You can also review all of your test results from your hospital stay or ER visit. Additional Information If you have questions, please visit the Frequently Asked Questions section of the [employer] [facility] website at [email or web address] Remember, [facility] is NOT to be used for urgent needs. For medical emergencies, dial 911. Now available from your iPhone and Android! Please provide this summary of care documentation to your next provider. Your primary care clinician is listed as Ap Curiel. If you have any questions after today's visit, please call [phone].

## 2018-01-25 LAB
ALBUMIN SERPL-MCNC: 4.4 G/DL (ref 3.5–4.7)
ALBUMIN/CREAT UR: 10.8 MG/G CREAT (ref 0–30)
ALBUMIN/GLOB SERPL: 1.9 {RATIO} (ref 1.2–2.2)
ALP SERPL-CCNC: 108 IU/L (ref 39–117)
ALT SERPL-CCNC: 21 IU/L (ref 0–44)
AST SERPL-CCNC: 28 IU/L (ref 0–40)
BASOPHILS # BLD AUTO: NORMAL 10*3/UL
BILIRUB SERPL-MCNC: 0.4 MG/DL (ref 0–1.2)
BUN SERPL-MCNC: 21 MG/DL (ref 8–27)
BUN/CREAT SERPL: 18 (ref 10–24)
CALCIUM SERPL-MCNC: 8.9 MG/DL (ref 8.6–10.2)
CHLORIDE SERPL-SCNC: 101 MMOL/L (ref 96–106)
CHOLEST SERPL-MCNC: 143 MG/DL (ref 100–199)
CO2 SERPL-SCNC: 21 MMOL/L (ref 18–29)
CREAT SERPL-MCNC: 1.19 MG/DL (ref 0.76–1.27)
CREAT UR-MCNC: 84.5 MG/DL
EOSINOPHIL # BLD AUTO: NORMAL 10*3/UL
EOSINOPHIL NFR BLD AUTO: NORMAL %
EST. AVERAGE GLUCOSE BLD GHB EST-MCNC: 194 MG/DL
GFR SERPLBLD CREATININE-BSD FMLA CKD-EPI: 55 ML/MIN/1.73
GFR SERPLBLD CREATININE-BSD FMLA CKD-EPI: 64 ML/MIN/1.73
GLOBULIN SER CALC-MCNC: 2.3 G/DL (ref 1.5–4.5)
GLUCOSE SERPL-MCNC: 235 MG/DL (ref 65–99)
HBA1C MFR BLD: 8.4 % (ref 4.8–5.6)
HCT VFR BLD AUTO: NORMAL %
HDLC SERPL-MCNC: 36 MG/DL
HGB BLD-MCNC: NORMAL G/DL
INTERPRETATION: NORMAL
LDLC SERPL CALC-MCNC: 75 MG/DL (ref 0–99)
LYMPHOCYTES # BLD AUTO: NORMAL 10*3/UL
LYMPHOCYTES NFR BLD AUTO: NORMAL %
MICROALBUMIN UR-MCNC: 9.1 UG/ML
MONOCYTES NFR BLD AUTO: NORMAL %
NEUTROPHILS NFR BLD AUTO: NORMAL %
PLATELET # BLD AUTO: NORMAL 10*3/UL
POTASSIUM SERPL-SCNC: 5.2 MMOL/L (ref 3.5–5.2)
PROT SERPL-MCNC: 6.7 G/DL (ref 6–8.5)
RBC # BLD AUTO: NORMAL 10*6/UL
SODIUM SERPL-SCNC: 141 MMOL/L (ref 134–144)
TRIGL SERPL-MCNC: 158 MG/DL (ref 0–149)
VLDLC SERPL CALC-MCNC: 32 MG/DL (ref 5–40)
WBC # BLD AUTO: NORMAL X10E3/UL

## 2018-01-29 ENCOUNTER — TELEPHONE (OUTPATIENT)
Dept: FAMILY MEDICINE CLINIC | Age: 83
End: 2018-01-29

## 2018-01-29 NOTE — TELEPHONE ENCOUNTER
Dr Dayana Grigsby office got the shoe form but needs notes form his visit in January.   Fax to [phone]

## 2018-02-20 ENCOUNTER — OFFICE VISIT (OUTPATIENT)
Dept: CARDIOLOGY CLINIC | Age: 83
End: 2018-02-20

## 2018-02-20 VITALS
BODY MASS INDEX: 28.61 KG/M2 | OXYGEN SATURATION: 98 % | WEIGHT: 178 LBS | SYSTOLIC BLOOD PRESSURE: 104 MMHG | HEIGHT: 66 IN | HEART RATE: 58 BPM | DIASTOLIC BLOOD PRESSURE: 80 MMHG | RESPIRATION RATE: 18 BRPM

## 2018-02-20 DIAGNOSIS — I95.1 ORTHOSTATIC HYPOTENSION: ICD-10-CM

## 2018-02-20 DIAGNOSIS — E11.65 TYPE 2 DIABETES MELLITUS WITH HYPERGLYCEMIA, WITHOUT LONG-TERM CURRENT USE OF INSULIN (HCC): ICD-10-CM

## 2018-02-20 DIAGNOSIS — E78.00 PURE HYPERCHOLESTEROLEMIA: ICD-10-CM

## 2018-02-20 DIAGNOSIS — I10 ESSENTIAL HYPERTENSION: Primary | ICD-10-CM

## 2018-02-20 NOTE — PROGRESS NOTES
Savannah Billy is a 80 y.o. male is here for routine f/u. The patient denies chest pain/ shortness of breath, orthopnea, PND, LE edema, palpitations, syncope, presyncope or fatigue. Patient Active Problem List    Diagnosis Date Noted    Type 2 diabetes mellitus with hyperglycemia, without long-term current use of insulin ([facility] [address] 10/19/2017    Seizure ([facility] [address] [facility]     GERD (gastroesophageal reflux disease) 07/15/2015    Orthostatic hypotension     Hypertension     Pure hypercholesterolemia       Dimitri Queen MD  Past Medical History:   Diagnosis Date    Diabetes mellitus, type 2 ([facility] [address]     DJD (degenerative joint disease)     GERD (gastroesophageal reflux disease)     Hypertension     Orthostatic hypotension     Parasomnia     PUD (peptic ulcer disease)     Pure hypercholesterolemia     Seizure [facility])       Past Surgical History:   Procedure Laterality Date    ENDOSCOPY, COLON, DIAGNOSTIC  01/2012    HX CHOLECYSTECTOMY  02/2012    laparoscopic    HX GI  01/2012    endoscopy,BX    HX KNEE REPLACEMENT      Bilateral     No Known Allergies   Family History   Problem Relation Age of Onset    COPD Father     COPD Sister     COPD Sister       Social History     Social History    Marital status:      Spouse name: N/A    Number of children: N/A    Years of education: N/A     Occupational History    Not on file. Social History Main Topics    Smoking status: Current Every Day Smoker     Types: Cigars    Smokeless tobacco: Never Used    Alcohol use No    Drug use: No    Sexual activity: No     Other Topics Concern    Not on file     Social History Narrative      Current Outpatient Prescriptions   Medication Sig    pantoprazole (PROTONIX) 40 mg tablet TAKE 1 TABLET DAILY    metFORMIN ER (GLUCOPHAGE XR) 500 mg tablet TAKE 2 TABLETS DAILY    pindolol (VISKIN) 5 mg tablet TAKE 1 TABLET TWICE A DAY    pravastatin (PRAVACHOL) 20 mg tablet Take 1 Tab by mouth nightly.     Blood-Glucose Meter monitoring kit Test blood sugar daily. Dx. E11.65, not on insulin    glucose blood VI test strips (BLOOD GLUCOSE TEST) strip Test blood sugar daily. Dx E11.65 not on insulin    cinnamon bark-chromium picolin 500-100 mg-mcg cap Take 1 Cap by mouth daily. Indications: DIABETES MELLITUS    aspirin delayed-release 81 mg tablet Take 1 Tab by mouth daily.  QUININE SULFATE PO Take 300 mg by mouth as needed.  ferrous sulfate (IRON) 325 mg (65 mg iron) tablet Take  by mouth Daily (before breakfast). Take 1 every other day    betamethasone valerate (VALISONE) 0.1 % ointment Apply  to affected area two (2) times a day. (Patient taking differently: Apply  to affected area two (2) times daily as needed.)    methylcellulose, laxative, (FIBER THERAPY) Powd Take  by mouth.  acetaminophen (TYLENOL) 500 mg tablet Take  by mouth every six (6) hours as needed.  B.infantis-B.ani-B.long-B.bifi (PROBIOTIC 4X) 10-15 mg TbEC Take  by mouth. No current facility-administered medications for this visit. Review of Symptoms:    CONST  No weight change. No fever, chills, sweats    ENT No visual changes, URI sx, sore throat    CV  See HPI   RESP  No cough, or sputum, wheezing. Also see HPI   GI  No abdominal pain or change in bowel habits. No heartburn or dysphagia. No melena or rectal bleeding.   No dysuria, urgency, frequency, hematuria   MSKEL  No joint pain, swelling. No muscle pain. SKIN  No rash or lesions. NEURO  No headache, syncope, or seizure. No weakness, loss of sensation, or paresthesias. PSYCH  No low mood or depression  No anxiety. HE/LYMPH  No easy bruising, abnormal bleeding, or enlarged glands.         Physical ExamPhysical Exam:    Visit Vitals    /80 (BP 1 Location: Left arm, BP Patient Position: Sitting)    Pulse (!) 58    Resp 18    Ht 5' 6\" (1.676 m)    Wt 178 lb (80.7 kg)    SpO2 98%    BMI 28.73 kg/m2     Gen: NAD  HEENT:  PERRL, throat clear  Neck: no adenopathy, no thyromegaly, no JVD   Heart:  Regular,Nl S1S2,  no murmur, gallop or rub.   Lungs:  clear  Abdomen:   Soft, non-tender, bowel sounds are active.   Extremities:  No edema  Pulse: symmetric  Neuro: A&O times 3, No focal neuro deficits    Cardiographics    Labs:   Lab Results   Component Value Date/Time    Sodium 141 01/23/2018 09:03 AM    Sodium 143 10/19/2017 09:10 AM    Sodium 142 04/20/2017 08:24 AM    Sodium 141 12/21/2016 08:18 AM    Sodium 143 06/16/2016 08:58 AM    Potassium 5.2 01/23/2018 09:03 AM    Potassium 4.8 10/19/2017 09:10 AM    Potassium 5.1 04/20/2017 08:24 AM    Potassium 4.9 12/21/2016 08:18 AM    Potassium 4.8 06/16/2016 08:58 AM    Chloride 101 01/23/2018 09:03 AM    Chloride 101 10/19/2017 09:10 AM    Chloride 101 04/20/2017 08:24 AM    Chloride 100 12/21/2016 08:18 AM    Chloride 103 06/16/2016 08:58 AM    CO2 21 01/23/2018 09:03 AM    CO2 26 10/19/2017 09:10 AM    CO2 22 04/20/2017 08:24 AM    CO2 25 12/21/2016 08:18 AM    CO2 25 06/16/2016 08:58 AM    Glucose 235 (H) 01/23/2018 09:03 AM    Glucose 230 (H) 10/19/2017 09:10 AM    Glucose 286 (H) 04/20/2017 08:24 AM    Glucose 184 (H) 12/21/2016 08:18 AM    Glucose 195 (H) 06/16/2016 08:58 AM    BUN 21 01/23/2018 09:03 AM    BUN 15 10/19/2017 09:10 AM    BUN 12 04/20/2017 08:24 AM    BUN 25 12/21/2016 08:18 AM    BUN 12 06/16/2016 08:58 AM    Creatinine 1.19 01/23/2018 09:03 AM    Creatinine 1.08 10/19/2017 09:10 AM    Creatinine 1.20 04/20/2017 08:24 AM    Creatinine 1.38 (H) 12/21/2016 08:18 AM    Creatinine 1.00 06/16/2016 08:58 AM    BUN/Creatinine ratio 18 01/23/2018 09:03 AM    BUN/Creatinine ratio 14 10/19/2017 09:10 AM    BUN/Creatinine ratio 10 04/20/2017 08:24 AM    BUN/Creatinine ratio 18 12/21/2016 08:18 AM    BUN/Creatinine ratio 12 06/16/2016 08:58 AM    GFR est AA 64 01/23/2018 09:03 AM    GFR est AA 72 10/19/2017 09:10 AM    GFR est AA 63 04/20/2017 08:24 AM    GFR est AA 54 (L) 12/21/2016 08:18 AM GFR est AA 80 06/16/2016 08:58 AM    GFR est non-AA 55 (L) 01/23/2018 09:03 AM    GFR est non-AA 62 10/19/2017 09:10 AM    GFR est non-AA 55 (L) 04/20/2017 08:24 AM    GFR est non-AA 47 (L) 12/21/2016 08:18 AM    GFR est non-AA 69 06/16/2016 08:58 AM    Calcium 8.9 01/23/2018 09:03 AM    Calcium 9.1 10/19/2017 09:10 AM    Calcium 8.7 04/20/2017 08:24 AM    Calcium 8.6 12/21/2016 08:18 AM    Calcium 8.7 06/16/2016 08:58 AM    Bilirubin, total 0.4 01/23/2018 09:03 AM    Bilirubin, total 0.3 10/19/2017 09:10 AM    Bilirubin, total 0.3 04/20/2017 08:24 AM    Bilirubin, total 0.7 12/21/2016 08:18 AM    Bilirubin, total 0.3 06/16/2016 08:58 AM    AST (SGOT) 28 01/23/2018 09:03 AM    AST (SGOT) 23 10/19/2017 09:10 AM    AST (SGOT) 25 04/20/2017 08:24 AM    AST (SGOT) 28 12/21/2016 08:18 AM    AST (SGOT) 22 06/16/2016 08:58 AM    Alk. phosphatase 108 01/23/2018 09:03 AM    Alk. phosphatase 110 10/19/2017 09:10 AM    Alk. phosphatase 107 04/20/2017 08:24 AM    Alk. phosphatase 90 12/21/2016 08:18 AM    Alk.  phosphatase 97 06/16/2016 08:58 AM    Protein, total 6.7 01/23/2018 09:03 AM    Protein, total 6.7 10/19/2017 09:10 AM    Protein, total 6.2 04/20/2017 08:24 AM    Protein, total 6.3 12/21/2016 08:18 AM    Protein, total 6.2 06/16/2016 08:58 AM    Albumin 4.4 01/23/2018 09:03 AM    Albumin 4.3 10/19/2017 09:10 AM    Albumin 4.1 04/20/2017 08:24 AM    Albumin 4.1 12/21/2016 08:18 AM    Albumin 4.0 06/16/2016 08:58 AM    A-G Ratio 1.9 01/23/2018 09:03 AM    A-G Ratio 1.8 10/19/2017 09:10 AM    A-G Ratio 2.0 04/20/2017 08:24 AM    A-G Ratio 1.9 12/21/2016 08:18 AM    A-G Ratio 1.8 06/16/2016 08:58 AM    ALT (SGPT) 21 01/23/2018 09:03 AM    ALT (SGPT) 20 10/19/2017 09:10 AM    ALT (SGPT) 20 04/20/2017 08:24 AM    ALT (SGPT) 17 12/21/2016 08:18 AM    ALT (SGPT) 18 06/16/2016 08:58 AM     No results found for: CPK, CPKX, CPX  Lab Results   Component Value Date/Time    Cholesterol, total 143 01/23/2018 09:03 AM Cholesterol, total 149 10/19/2017 09:10 AM    Cholesterol, total 142 04/20/2017 08:24 AM    Cholesterol, total 134 12/21/2016 08:18 AM    Cholesterol, total 133 06/16/2016 08:58 AM    HDL Cholesterol 36 (L) 01/23/2018 09:03 AM    HDL Cholesterol 43 10/19/2017 09:10 AM    HDL Cholesterol 39 (L) 04/20/2017 08:24 AM    HDL Cholesterol 44 12/21/2016 08:18 AM    HDL Cholesterol 38 (L) 06/16/2016 08:58 AM    LDL, calculated 75 01/23/2018 09:03 AM    LDL, calculated 79 10/19/2017 09:10 AM    LDL, calculated 70 04/20/2017 08:24 AM    LDL, calculated 77 12/21/2016 08:18 AM    LDL, calculated 74 06/16/2016 08:58 AM    Triglyceride 158 (H) 01/23/2018 09:03 AM    Triglyceride 137 10/19/2017 09:10 AM    Triglyceride 164 (H) 04/20/2017 08:24 AM    Triglyceride 66 12/21/2016 08:18 AM    Triglyceride 103 06/16/2016 08:58 AM     No results found for this or any previous visit. Assessment:         Patient Active Problem List    Diagnosis Date Noted    Type 2 diabetes mellitus with hyperglycemia, without long-term current use of insulin ([facility] [address] 10/19/2017    Seizure ([facility] [address]     GERD (gastroesophageal reflux disease) 07/15/2015    Orthostatic hypotension     Hypertension     Pure hypercholesterolemia         Plan:     Doing well with no adverse cardiac symptoms. Lipids and labs followed by PCP. Continue current care and f/u in 6 months.     Rasheed Tristan MD

## 2018-02-20 NOTE — PROGRESS NOTES
Verified patient with two patient identifiers. Medications reviewed/approved by Dr. Nannette Thompson. Verbal from Dr. Nannette Thompson to remove the medications that were deleted during the visit. Chief Complaint   Patient presents with    Hypertension     6 month follow up     1. Have you been to the ER, urgent care clinic since your last visit? Hospitalized since your last visit? No.    2. Have you seen or consulted any other health care providers outside of the [address] [place] [address] since your last visit? Include any pap smears or colon screening. Yes, foot doctpr in [address] [place] [address]

## 2018-02-20 NOTE — MR AVS SNAPSHOT
[address] [facility] 
 
 
 [address] [facility] [address] [address] [phone] Patient: Ellaree Crigler MRN: [id number] OOT:[id number] Visit Information Date & Time Provider Department Dept. Phone Encounter #  
 2/20/2018  1:20 PM Dereck Suarez, [address] [facility] [phone] [id number] Follow-up Instructions Return in about 6 months (around 8/20/2018). Follow-up and Disposition History Your Appointments 7/23/2018  9:30 AM  
ESTABLISHED PATIENT with Grisel Abbasi MD  
[address] [place] [address] ([facility]) Appt Note: 6 mo F/U; 6 mo F/U  
 [id number] [place] [address]  
[phone]  
  
   
 [facility] 53 [address]  
  
    
 8/30/2018 11:20 AM  
ESTABLISHED PATIENT with Dereck Suarez MD  
[address] [place] [facility] Appt Note: 6 mo fu $0cp 13[address] [facility] 67 [address] [phone]  
  
   
 [address] [place] [address] Upcoming Health Maintenance Date Due  
 EYE EXAM RETINAL OR DILATED Q1 3/20/1942 GLAUCOMA SCREENING Q2Y 3/20/1997 HEMOGLOBIN A1C Q6M 7/23/2018 MEDICARE YEARLY EXAM 10/20/2018 FOOT EXAM Q1 10/30/2018 MICROALBUMIN Q1 1/23/2019 LIPID PANEL Q1 1/23/2019 DTaP/Tdap/Td series (2 - Td) 10/19/2027 Allergies as of 2/20/2018  Review Complete On: 2/20/2018 By: Dereck Suarez MD  
 No Known Allergies Current Immunizations  Reviewed on 10/19/2016 Name Date Influenza High Dose Vaccine PF 10/19/2017, 10/19/2016 Influenza Vaccine 10/17/2014, 10/11/2013  9:32 AM  
 Influenza Vaccine (Quad) 10/28/2015 10:28 AM  
 Pneumococcal Conjugate (PCV-13) 10/28/2015 10:28 AM  
 Pneumococcal Polysaccharide (PPSV-23) 10/11/2013  9:33 AM  
  
 Not reviewed this visit You Were Diagnosed With   
  
 Codes Comments Essential hypertension    -  Primary ICD-10-CM: I10 
ICD-9-CM: 401.9 Type 2 diabetes mellitus with hyperglycemia, without long-term current use of insulin (HCC)     ICD-10-CM: E11.65 ICD-9-CM: 250.00, 790.29 Orthostatic hypotension     ICD-10-CM: I95.1 ICD-9-CM: 458.0 Pure hypercholesterolemia     ICD-10-CM: E78.00 ICD-9-CM: 272.0 Vitals BP Pulse Resp Height(growth percentile) Weight(growth percentile) SpO2  
 104/80 (BP 1 Location: Left arm, BP Patient Position: Sitting) (!) 58 18 5' 6\" (1.676 m) 178 lb (80.7 kg) 98% BMI Smoking Status 28.73 kg/m2 Current Every Day Smoker Vitals History BMI and BSA Data Body Mass Index Body Surface Area 28.73 kg/m 2 1.94 m 2 Preferred Pharmacy Pharmacy Name Phone [address], [facility] [phone] Your Updated Medication List  
  
   
This list is accurate as of: 2/20/18  1:51 PM.  Always use your most recent med list.  
  
  
  
  
 acetaminophen 500 mg tablet Commonly known as:  TYLENOL Take  by mouth every six (6) hours as needed. aspirin delayed-release 81 mg tablet Take 1 Tab by mouth daily. betamethasone valerate 0.1 % ointment Commonly known as:  Julian Gabrielle Apply  to affected area two (2) times a day. Blood-Glucose Meter monitoring kit Test blood sugar daily. Dx. E11.65, not on insulin  
  
 cinnamon bark-chromium picolin 500-100 mg-mcg Cap Take 1 Cap by mouth daily. Indications: DIABETES MELLITUS FIBER THERAPY (M-CELL/SUGAR) Powd Generic drug:  methylcellulose (laxative) Take  by mouth. glucose blood VI test strips strip Commonly known as:  blood glucose test  
Test blood sugar daily. Dx E11.65 not on insulin Iron 325 mg (65 mg iron) tablet Generic drug:  ferrous sulfate Take  by mouth Daily (before breakfast). Take 1 every other day  
  
 metFORMIN  mg tablet Commonly known as:  GLUCOPHAGE XR  
TAKE 2 TABLETS DAILY pantoprazole 40 mg tablet Commonly known as:  PROTONIX  
TAKE 1 TABLET DAILY pindolol 5 mg tablet Commonly known as:  VISKIN  
TAKE 1 TABLET TWICE A DAY  
  
 pravastatin 20 mg tablet Commonly known as:  PRAVACHOL Take 1 Tab by mouth nightly. PROBIOTIC 4X 10-15 mg Tbec Generic drug:  B.infantis-B.ani-B.long-B.bifi Take  by mouth. QUININE SULFATE PO Take 300 mg by mouth as needed. Follow-up Instructions Return in about 6 months (around 8/20/2018). Introducing [facility]! Dear Mae Leonardo: Thank you for requesting a [employer] account. Our records indicate that you already have an active [employer] account. You can access your account anytime at [email or web address] Did you know that you can access your hospital and ER discharge instructions at any time in [employer]? You can also review all of your test results from your hospital stay or ER visit. Additional Information If you have questions, please visit the Frequently Asked Questions section of the [employer] website at [email or web address] Remember, [employer] is NOT to be used for urgent needs. For medical emergencies, dial 911. Now available from your iPhone and Android! Please provide this summary of care documentation to your next provider. Your primary care clinician is listed as Mary Carlos. If you have any questions after today's visit, please call [phone].

## 2018-02-26 RX ORDER — PINDOLOL 5 MG/1
TABLET ORAL
Qty: 180 TAB | Refills: 1 | Status: SHIPPED | OUTPATIENT
Start: 2018-02-26 | End: 2018-08-23 | Stop reason: SDUPTHER

## 2018-05-21 RX ORDER — METFORMIN HYDROCHLORIDE 500 MG/1
TABLET, EXTENDED RELEASE ORAL
Qty: 180 TAB | Refills: 1 | Status: SHIPPED | OUTPATIENT
Start: 2018-05-21 | End: 2018-11-16 | Stop reason: SDUPTHER

## 2018-05-24 RX ORDER — PANTOPRAZOLE SODIUM 40 MG/1
TABLET, DELAYED RELEASE ORAL
Qty: 90 TAB | Refills: 1 | Status: SHIPPED | OUTPATIENT
Start: 2018-05-24 | End: 2018-11-20 | Stop reason: SDUPTHER

## 2018-07-17 ENCOUNTER — OFFICE VISIT (OUTPATIENT)
Dept: FAMILY MEDICINE CLINIC | Age: 83
End: 2018-07-17

## 2018-07-17 VITALS
HEART RATE: 62 BPM | WEIGHT: 175 LBS | OXYGEN SATURATION: 98 % | RESPIRATION RATE: 16 BRPM | HEIGHT: 66 IN | BODY MASS INDEX: 28.12 KG/M2 | SYSTOLIC BLOOD PRESSURE: 122 MMHG | DIASTOLIC BLOOD PRESSURE: 68 MMHG

## 2018-07-17 DIAGNOSIS — I10 ESSENTIAL HYPERTENSION: ICD-10-CM

## 2018-07-17 DIAGNOSIS — E11.65 TYPE 2 DIABETES MELLITUS WITH HYPERGLYCEMIA, WITHOUT LONG-TERM CURRENT USE OF INSULIN (HCC): Primary | ICD-10-CM

## 2018-07-17 DIAGNOSIS — E78.00 PURE HYPERCHOLESTEROLEMIA: ICD-10-CM

## 2018-07-17 RX ORDER — LORATADINE 10 MG/1
10 TABLET ORAL DAILY
COMMUNITY

## 2018-07-17 NOTE — PROGRESS NOTES
Chief Complaint   Patient presents with    Diabetes         HPI:      Caroline Conway is a 80 y.o. male. Retired [employer] (21 yrs). T2DM, hyperlipidemia and orthostatic hypotension. History of essential tremor. He stopped the Lamictal and feels more alert and less fatigued. Physically active. Watches diet. BP remains well controlled with SBP < 130       Experiences occasional nightmares. Hypoglycemic about every 6 weeks. Due for labs. No Known Allergies    Current Outpatient Prescriptions   Medication Sig    loratadine (CLARITIN) 10 mg tablet Take 10 mg by mouth.  pantoprazole (PROTONIX) 40 mg tablet TAKE 1 TABLET DAILY    metFORMIN ER (GLUCOPHAGE XR) 500 mg tablet TAKE 2 TABLETS DAILY    pindolol (VISKIN) 5 mg tablet TAKE 1 TABLET TWICE A DAY    Blood-Glucose Meter monitoring kit Test blood sugar daily. Dx. E11.65, not on insulin    glucose blood VI test strips (BLOOD GLUCOSE TEST) strip Test blood sugar daily. Dx E11.65 not on insulin    cinnamon bark-chromium picolin 500-100 mg-mcg cap Take 1 Cap by mouth daily. Indications: DIABETES MELLITUS    aspirin delayed-release 81 mg tablet Take 1 Tab by mouth daily.  QUININE SULFATE PO Take 300 mg by mouth as needed.  ferrous sulfate (IRON) 325 mg (65 mg iron) tablet Take  by mouth Daily (before breakfast). Take 1 every other day    methylcellulose, laxative, (FIBER THERAPY) Powd Take  by mouth.  acetaminophen (TYLENOL) 500 mg tablet Take  by mouth every six (6) hours as needed.  B.infantis-B.ani-B.long-B.bifi (PROBIOTIC 4X) 10-15 mg TbEC Take  by mouth.  pravastatin (PRAVACHOL) 20 mg tablet Take 1 Tab by mouth nightly.  betamethasone valerate (VALISONE) 0.1 % ointment Apply  to affected area two (2) times a day. (Patient taking differently: Apply  to affected area two (2) times daily as needed.)     No current facility-administered medications for this visit.         Past Medical History:   Diagnosis Date    Diabetes mellitus, type 2 ([facility]     DJD (degenerative joint disease)     GERD (gastroesophageal reflux disease)     Hypertension     Orthostatic hypotension     Parasomnia     PUD (peptic ulcer disease)     Pure hypercholesterolemia     Seizure ([facility]          ROS:  Denies fever, chills, cough, chest pain, SOB,  nausea, vomiting, or diarrhea. Denies wt loss, wt gain, hemoptysis, hematochezia or melena. Physical Examination:    /68 (BP 1 Location: Left arm, BP Patient Position: Sitting)  Pulse 62  Resp 16  Ht 5' 6\" (1.676 m)  Wt 175 lb (79.4 kg)  SpO2 98%  BMI 28.25 kg/m2    General: Alert and Ox3, Fluent speech  HEENT:  NC/AT, EOMI, OP: clear  Neck:  Supple, no adenopathy, JVD, mass or bruit  Chest:  Clear to Ausculation, without wheezes, rales, rubs or ronchi  Cardiac: RRR  Abdomen:  +BS, soft, nontender without palpable HSM  Extremities:  No cyanosis, clubbing or edema  Neurologic:  Ambulatory without assist, CN 2-12 grossly intact. Moves all extremities. Skin: no rash  Lymphadenopathy: no cervical or supraclavicular nodes    Lab Results   Component Value Date/Time    Hemoglobin A1c 8.4 (H) 01/23/2018 09:03 AM     Lab Results   Component Value Date/Time    LDL, calculated 75 01/23/2018 09:03 AM     BP Readings from Last 3 Encounters:   07/17/18 122/68   02/20/18 104/80   01/23/18 116/60     Wt Readings from Last 3 Encounters:   07/17/18 175 lb (79.4 kg)   02/20/18 178 lb (80.7 kg)   01/23/18 179 lb (81.2 kg)       ASSESSMENT AND PLAN:     1. T2DM:  Due for labs  2. Well controlled HTN  3.  ET:  Doing well off Lamictal  4. RTC in 6 months    Orders Placed This Encounter    CBC WITH AUTOMATED DIFF    LIPID PANEL    HEMOGLOBIN A1C WITH EAG    METABOLIC PANEL, COMPREHENSIVE    TSH 3RD GENERATION    loratadine (CLARITIN) 10 mg tablet     Sig: Take 10 mg by mouth.        Keron Umanzor MD, [address]

## 2018-07-17 NOTE — PROGRESS NOTES
1. Have you been to the ER, urgent care clinic since your last visit? Hospitalized since your last visit? No    2. Have you seen or consulted any other health care providers outside of the [address] [place] [address] since your last visit? Include any pap smears or colon screening.  Yes Reason for visit: Dr. Chance Jean-Baptiste, diabetic foot exam

## 2018-07-17 NOTE — MR AVS SNAPSHOT
John Kapoor 
 
 
 [address] 
[phone] Patient: Lance Bains MRN: [id number] Visit Information Date & Time Provider Department Dept. Phone Encounter #  
 7/17/2018 10:00 AM [phone] [id number] Follow-up Instructions Return in about 6 months (around 1/17/2019). Follow-up and Disposition History Your Appointments 8/30/2018 11:20 AM  
ESTABLISHED PATIENT with Mike Eng MD  
[address] [facility]) Appt Note: 6 mo fu $0cp [address] [id number] [phone]  
  
   
 [address] [place] [address] Upcoming Health Maintenance Date Due  
 EYE EXAM RETINAL OR DILATED Q1 3/20/1942 GLAUCOMA SCREENING Q2Y 3/20/1997 HEMOGLOBIN A1C Q6M 7/23/2018 Influenza Age 5 to Adult 8/1/2018 MEDICARE YEARLY EXAM 10/20/2018 FOOT EXAM Q1 10/30/2018 MICROALBUMIN Q1 1/23/2019 LIPID PANEL Q1 1/23/2019 DTaP/Tdap/Td series (2 - Td) 10/19/2027 Allergies as of 7/17/2018  Review Complete On: 7/17/2018 By: Rufino Hendricks MD  
 No Known Allergies Current Immunizations  Reviewed on 10/19/2016 Name Date Influenza High Dose Vaccine PF 10/19/2017, 10/19/2016 Influenza Vaccine 10/17/2014, 10/11/2013  9:32 AM  
 Influenza Vaccine (Quad) 10/28/2015 10:28 AM  
 Pneumococcal Conjugate (PCV-13) 10/28/2015 10:28 AM  
 Pneumococcal Polysaccharide (PPSV-23) 10/11/2013  9:33 AM  
  
 Not reviewed this visit You Were Diagnosed With   
  
 Codes Comments Type 2 diabetes mellitus with hyperglycemia, without long-term current use of insulin (HCC)    -  Primary ICD-10-CM: E11.65 ICD-9-CM: 250.00, 790.29 Essential hypertension     ICD-10-CM: I10 
ICD-9-CM: 401.9  Pure hypercholesterolemia     ICD-10-CM: E78.00 
 ICD-9-CM: 272.0 Vitals BP Pulse Resp Height(growth percentile) Weight(growth percentile) SpO2  
 122/68 (BP 1 Location: Left arm, BP Patient Position: Sitting) 62 16 5' 6\" (1.676 m) 175 lb (79.4 kg) 98% BMI Smoking Status 28.25 kg/m2 Current Every Day Smoker BMI and BSA Data Body Mass Index Body Surface Area  
 28.25 kg/m 2 1.92 m 2 Preferred Pharmacy Pharmacy Name Phone [facility], [facility] [phone] Your Updated Medication List  
  
   
This list is accurate as of 7/17/18 10:27 AM.  Always use your most recent med list.  
  
  
  
  
 acetaminophen 500 mg tablet Commonly known as:  TYLENOL Take  by mouth every six (6) hours as needed. aspirin delayed-release 81 mg tablet Take 1 Tab by mouth daily. betamethasone valerate 0.1 % ointment Commonly known as:  O[place] Apply  to affected area two (2) times a day. Blood-Glucose Meter monitoring kit Test blood sugar daily. Dx. E11.65, not on insulin  
  
 cinnamon bark-chromium picolin 500-100 mg-mcg Cap Take 1 Cap by mouth daily. Indications: DIABETES MELLITUS  
  
 CLARITIN 10 mg tablet Generic drug:  loratadine Take 10 mg by mouth. FIBER THERAPY (M-CELL/SUGAR) Powd Generic drug:  methylcellulose (laxative) Take  by mouth. glucose blood VI test strips strip Commonly known as:  blood glucose test  
Test blood sugar daily. Dx E11.65 not on insulin Iron 325 mg (65 mg iron) tablet Generic drug:  ferrous sulfate Take  by mouth Daily (before breakfast). Take 1 every other day  
  
 metFORMIN  mg tablet Commonly known as:  GLUCOPHAGE XR  
TAKE 2 TABLETS DAILY pantoprazole 40 mg tablet Commonly known as:  PROTONIX  
TAKE 1 TABLET DAILY pindolol 5 mg tablet Commonly known as:  VISKIN  
TAKE 1 TABLET TWICE A DAY  
  
 pravastatin 20 mg tablet Commonly known as:  PRAVACHOL  
 Take 1 Tab by mouth nightly. PROBIOTIC 4X 10-15 mg Tbec Generic drug:  B.infantis-B.ani-B.long-B.bifi Take  by mouth. QUININE SULFATE PO Take 300 mg by mouth as needed. We Performed the Following CBC WITH AUTOMATED DIFF [73104 CPT(R)] HEMOGLOBIN A1C WITH EAG [96718 CPT(R)] LIPID PANEL [52771 CPT(R)] METABOLIC PANEL, COMPREHENSIVE [19790 CPT(R)] TSH 3RD GENERATION [52055 CPT(R)] Follow-up Instructions Return in about 6 months (around 1/17/2019). Introducing [facility]! Dear Paola Blank: Thank you for requesting a [employer] account. Our records indicate that you already have an active [employer] account. You can access your account anytime at [email or web address] [email or web address]/Passbox Did you know that you can access your hospital and ER discharge instructions at any time in [employer]? You can also review all of your test results from your hospital stay or ER visit. Additional Information If you have questions, please visit the Frequently Asked Questions section of the [employer] website at [email or web address]/. Remember, [employer] is NOT to be used for urgent needs. For medical emergencies, dial 911. Now available from your iPhone and Android! Please provide this summary of care documentation to your next provider. Your primary care clinician is listed as Charlie Mccabe. If you have any questions after today's visit, please call [phone].

## 2018-07-18 LAB
ALBUMIN SERPL-MCNC: 4.3 G/DL (ref 3.5–4.7)
ALBUMIN/GLOB SERPL: 2 {RATIO} (ref 1.2–2.2)
ALP SERPL-CCNC: 103 IU/L (ref 39–117)
ALT SERPL-CCNC: 18 IU/L (ref 0–44)
AST SERPL-CCNC: 24 IU/L (ref 0–40)
BASOPHILS # BLD AUTO: 0 X10E3/UL (ref 0–0.2)
BASOPHILS NFR BLD AUTO: 0 %
BILIRUB SERPL-MCNC: 0.3 MG/DL (ref 0–1.2)
BUN SERPL-MCNC: 17 MG/DL (ref 8–27)
BUN/CREAT SERPL: 14 (ref 10–24)
CALCIUM SERPL-MCNC: 9.4 MG/DL (ref 8.6–10.2)
CHLORIDE SERPL-SCNC: 101 MMOL/L (ref 96–106)
CHOLEST SERPL-MCNC: 141 MG/DL (ref 100–199)
CO2 SERPL-SCNC: 26 MMOL/L (ref 20–29)
CREAT SERPL-MCNC: 1.19 MG/DL (ref 0.76–1.27)
EOSINOPHIL # BLD AUTO: 0.3 X10E3/UL (ref 0–0.4)
EOSINOPHIL NFR BLD AUTO: 5 %
ERYTHROCYTE [DISTWIDTH] IN BLOOD BY AUTOMATED COUNT: 13.5 % (ref 12.3–15.4)
EST. AVERAGE GLUCOSE BLD GHB EST-MCNC: 169 MG/DL
GLOBULIN SER CALC-MCNC: 2.2 G/DL (ref 1.5–4.5)
GLUCOSE SERPL-MCNC: 145 MG/DL (ref 65–99)
HBA1C MFR BLD: 7.5 % (ref 4.8–5.6)
HCT VFR BLD AUTO: 43.1 % (ref 37.5–51)
HDLC SERPL-MCNC: 44 MG/DL
HGB BLD-MCNC: 14.5 G/DL (ref 13–17.7)
IMM GRANULOCYTES # BLD: 0 X10E3/UL (ref 0–0.1)
IMM GRANULOCYTES NFR BLD: 0 %
INTERPRETATION: NORMAL
LDLC SERPL CALC-MCNC: 70 MG/DL (ref 0–99)
LYMPHOCYTES # BLD AUTO: 1.7 X10E3/UL (ref 0.7–3.1)
LYMPHOCYTES NFR BLD AUTO: 27 %
MCH RBC QN AUTO: 30.3 PG (ref 26.6–33)
MCHC RBC AUTO-ENTMCNC: 33.6 G/DL (ref 31.5–35.7)
MCV RBC AUTO: 90 FL (ref 79–97)
MONOCYTES # BLD AUTO: 0.5 X10E3/UL (ref 0.1–0.9)
MONOCYTES NFR BLD AUTO: 8 %
NEUTROPHILS # BLD AUTO: 3.7 X10E3/UL (ref 1.4–7)
NEUTROPHILS NFR BLD AUTO: 60 %
PLATELET # BLD AUTO: 187 X10E3/UL (ref 150–379)
POTASSIUM SERPL-SCNC: 4.9 MMOL/L (ref 3.5–5.2)
PROT SERPL-MCNC: 6.5 G/DL (ref 6–8.5)
RBC # BLD AUTO: 4.79 X10E6/UL (ref 4.14–5.8)
SODIUM SERPL-SCNC: 143 MMOL/L (ref 134–144)
TRIGL SERPL-MCNC: 134 MG/DL (ref 0–149)
TSH SERPL DL<=0.005 MIU/L-ACNC: 3.61 UIU/ML (ref 0.45–4.5)
VLDLC SERPL CALC-MCNC: 27 MG/DL (ref 5–40)
WBC # BLD AUTO: 6.2 X10E3/UL (ref 3.4–10.8)

## 2018-08-23 RX ORDER — PINDOLOL 5 MG/1
TABLET ORAL
Qty: 180 TAB | Refills: 1 | Status: SHIPPED | OUTPATIENT
Start: 2018-08-23 | End: 2019-02-19 | Stop reason: SDUPTHER

## 2018-08-30 ENCOUNTER — OFFICE VISIT (OUTPATIENT)
Dept: CARDIOLOGY CLINIC | Age: 83
End: 2018-08-30

## 2018-08-30 VITALS
HEIGHT: 66 IN | SYSTOLIC BLOOD PRESSURE: 114 MMHG | HEART RATE: 65 BPM | DIASTOLIC BLOOD PRESSURE: 68 MMHG | OXYGEN SATURATION: 99 % | WEIGHT: 173 LBS | RESPIRATION RATE: 12 BRPM | BODY MASS INDEX: 27.8 KG/M2

## 2018-08-30 DIAGNOSIS — K21.9 GASTROESOPHAGEAL REFLUX DISEASE WITHOUT ESOPHAGITIS: ICD-10-CM

## 2018-08-30 DIAGNOSIS — R00.2 HEART PALPITATIONS: ICD-10-CM

## 2018-08-30 DIAGNOSIS — I10 ESSENTIAL HYPERTENSION: Primary | ICD-10-CM

## 2018-08-30 DIAGNOSIS — E11.65 TYPE 2 DIABETES MELLITUS WITH HYPERGLYCEMIA, WITHOUT LONG-TERM CURRENT USE OF INSULIN (HCC): ICD-10-CM

## 2018-08-30 DIAGNOSIS — I95.1 ORTHOSTATIC HYPOTENSION: ICD-10-CM

## 2018-08-30 DIAGNOSIS — R56.9 SEIZURE (HCC): ICD-10-CM

## 2018-08-30 DIAGNOSIS — G45.9 TRANSIENT CEREBRAL ISCHEMIA, UNSPECIFIED TYPE: ICD-10-CM

## 2018-08-30 NOTE — PROGRESS NOTES
PATIENT ID VERIFIED WITH TWO PATIENT IDENTIFIERS. PATIENT MEDICATIONS REVIEWED AND APPROVED BY DR. Verner Sofia. MEDICATIONS THAT WERE REMOVED FROM THIS VISIT HAVE BEEN APPROVED BY DR. Verner Sofia. Chief Complaint   Patient presents with    Hypertension     6 MO F/U       1. Have you been to the ER, urgent care clinic since your last visit? Hospitalized since your last visit? NO    2. Have you seen or consulted any other health care providers outside of the [address] [place] [address] since your last visit? Include any pap smears or colon screening.  Eye doctor-Dr. Brook Mendieta 2 months ago for routine follow up

## 2018-08-30 NOTE — PROGRESS NOTES
Nomi Reese is a 80 y.o. male is here for routine f/u. Continues to see PCP. Less fatigue now off the lamictal (on for essential tremor). Episode of weakness last week after working in yard, resolved--thinks he was [employer]". The patient denies chest pain/ shortness of breath, orthopnea, PND, LE edema, palpitations, syncope, presyncope or fatigue. Patient Active Problem List    Diagnosis Date Noted    Type 2 diabetes mellitus with hyperglycemia, without long-term current use of insulin ([facility] 10/19/2017    Seizure ([facility]     GERD (gastroesophageal reflux disease) 07/15/2015    Orthostatic hypotension     Hypertension     Pure hypercholesterolemia       Ronn Amin MD  Past Medical History:   Diagnosis Date    Diabetes mellitus, type 2 ([facility]     DJD (degenerative joint disease)     GERD (gastroesophageal reflux disease)     Hypertension     Orthostatic hypotension     Parasomnia     PUD (peptic ulcer disease)     Pure hypercholesterolemia     Seizure [facility])       Past Surgical History:   Procedure Laterality Date    ENDOSCOPY, COLON, DIAGNOSTIC  01/2012    HX CHOLECYSTECTOMY  02/2012    laparoscopic    HX GI  01/2012    endoscopy,BX    HX KNEE REPLACEMENT      Bilateral     No Known Allergies   Family History   Problem Relation Age of Onset    COPD Father     COPD Sister     COPD Sister       Social History     Social History    Marital status:      Spouse name: N/A    Number of children: N/A    Years of education: N/A     Occupational History    Not on file.      Social History Main Topics    Smoking status: Current Every Day Smoker     Types: Cigars    Smokeless tobacco: Never Used    Alcohol use No    Drug use: No    Sexual activity: No     Other Topics Concern    Not on file     Social History Narrative      Current Outpatient Prescriptions   Medication Sig    pindolol (VISKIN) 5 mg tablet TAKE 1 TABLET TWICE A DAY    loratadine (CLARITIN) 10 mg tablet Take 10 mg by mouth daily.  pantoprazole (PROTONIX) 40 mg tablet TAKE 1 TABLET DAILY    metFORMIN ER (GLUCOPHAGE XR) 500 mg tablet TAKE 2 TABLETS DAILY    pravastatin (PRAVACHOL) 20 mg tablet Take 1 Tab by mouth nightly.  Blood-Glucose Meter monitoring kit Test blood sugar daily. Dx. E11.65, not on insulin    glucose blood VI test strips (BLOOD GLUCOSE TEST) strip Test blood sugar daily. Dx E11.65 not on insulin    cinnamon bark-chromium picolin 500-100 mg-mcg cap Take 1 Cap by mouth daily. Indications: DIABETES MELLITUS    aspirin delayed-release 81 mg tablet Take 1 Tab by mouth daily.  QUININE SULFATE PO Take 300 mg by mouth as needed.  ferrous sulfate (IRON) 325 mg (65 mg iron) tablet Take  by mouth Daily (before breakfast). Take 1 every other day    betamethasone valerate (VALISONE) 0.1 % ointment Apply  to affected area two (2) times a day. (Patient taking differently: Apply  to affected area two (2) times daily as needed.)    methylcellulose, laxative, (FIBER THERAPY) Powd Take  by mouth daily.  acetaminophen (TYLENOL) 500 mg tablet Take  by mouth every six (6) hours as needed.  B.infantis-B.ani-B.long-B.bifi (PROBIOTIC 4X) 10-15 mg TbEC Take  by mouth daily. No current facility-administered medications for this visit. Review of Symptoms:    CONST  No weight change. No fever, chills, sweats    ENT No visual changes, URI sx, sore throat    CV  See HPI   RESP  No cough, or sputum, wheezing. Also see HPI   GI  No abdominal pain or change in bowel habits. No heartburn or dysphagia. No melena or rectal bleeding.   No dysuria, urgency, frequency, hematuria   MSKEL  No joint pain, swelling. No muscle pain. SKIN  No rash or lesions. NEURO  No headache, syncope, or seizure. No weakness, loss of sensation, or paresthesias. PSYCH  No low mood or depression  No anxiety. HE/LYMPH  No easy bruising, abnormal bleeding, or enlarged glands.         Physical ExamPhysical Exam:    Visit Vitals    /68 (BP 1 Location: Right arm, BP Patient Position: Sitting)    Pulse 65    Resp 12    Ht 5' 6\" (1.676 m)    SpO2 99%     Gen: NAD  HEENT:  PERRL, throat clear  Neck: no adenopathy, no thyromegaly, no JVD   Heart:  Regular,Nl S1S2,  no murmur, gallop or rub.   Lungs:  clear  Abdomen:   Soft, non-tender, bowel sounds are active.   Extremities:  No edema  Pulse: symmetric  Neuro: A&O times 3, No focal neuro deficits    Cardiographics    ECG: NSR, wnl    Labs:   Lab Results   Component Value Date/Time    Sodium 143 07/17/2018 10:24 AM    Sodium 141 01/23/2018 09:03 AM    Sodium 143 10/19/2017 09:10 AM    Sodium 142 04/20/2017 08:24 AM    Sodium 141 12/21/2016 08:18 AM    Potassium 4.9 07/17/2018 10:24 AM    Potassium 5.2 01/23/2018 09:03 AM    Potassium 4.8 10/19/2017 09:10 AM    Potassium 5.1 04/20/2017 08:24 AM    Potassium 4.9 12/21/2016 08:18 AM    Chloride 101 07/17/2018 10:24 AM    Chloride 101 01/23/2018 09:03 AM    Chloride 101 10/19/2017 09:10 AM    Chloride 101 04/20/2017 08:24 AM    Chloride 100 12/21/2016 08:18 AM    CO2 26 07/17/2018 10:24 AM    CO2 21 01/23/2018 09:03 AM    CO2 26 10/19/2017 09:10 AM    CO2 22 04/20/2017 08:24 AM    CO2 25 12/21/2016 08:18 AM    Glucose 145 (H) 07/17/2018 10:24 AM    Glucose 235 (H) 01/23/2018 09:03 AM    Glucose 230 (H) 10/19/2017 09:10 AM    Glucose 286 (H) 04/20/2017 08:24 AM    Glucose 184 (H) 12/21/2016 08:18 AM    BUN 17 07/17/2018 10:24 AM    BUN 21 01/23/2018 09:03 AM    BUN 15 10/19/2017 09:10 AM    BUN 12 04/20/2017 08:24 AM    BUN 25 12/21/2016 08:18 AM    Creatinine 1.19 07/17/2018 10:24 AM    Creatinine 1.19 01/23/2018 09:03 AM    Creatinine 1.08 10/19/2017 09:10 AM    Creatinine 1.20 04/20/2017 08:24 AM    Creatinine 1.38 (H) 12/21/2016 08:18 AM    BUN/Creatinine ratio 14 07/17/2018 10:24 AM    BUN/Creatinine ratio 18 01/23/2018 09:03 AM    BUN/Creatinine ratio 14 10/19/2017 09:10 AM    BUN/Creatinine ratio 10 04/20/2017 08:24 AM    BUN/Creatinine ratio 18 12/21/2016 08:18 AM    GFR est AA 64 07/17/2018 10:24 AM    GFR est AA 64 01/23/2018 09:03 AM    GFR est AA 72 10/19/2017 09:10 AM    GFR est AA 63 04/20/2017 08:24 AM    GFR est AA 54 (L) 12/21/2016 08:18 AM    GFR est non-AA 55 (L) 07/17/2018 10:24 AM    GFR est non-AA 55 (L) 01/23/2018 09:03 AM    GFR est non-AA 62 10/19/2017 09:10 AM    GFR est non-AA 55 (L) 04/20/2017 08:24 AM    GFR est non-AA 47 (L) 12/21/2016 08:18 AM    Calcium 9.4 07/17/2018 10:24 AM    Calcium 8.9 01/23/2018 09:03 AM    Calcium 9.1 10/19/2017 09:10 AM    Calcium 8.7 04/20/2017 08:24 AM    Calcium 8.6 12/21/2016 08:18 AM    Bilirubin, total 0.3 07/17/2018 10:24 AM    Bilirubin, total 0.4 01/23/2018 09:03 AM    Bilirubin, total 0.3 10/19/2017 09:10 AM    Bilirubin, total 0.3 04/20/2017 08:24 AM    Bilirubin, total 0.7 12/21/2016 08:18 AM    AST (SGOT) 24 07/17/2018 10:24 AM    AST (SGOT) 28 01/23/2018 09:03 AM    AST (SGOT) 23 10/19/2017 09:10 AM    AST (SGOT) 25 04/20/2017 08:24 AM    AST (SGOT) 28 12/21/2016 08:18 AM    Alk. phosphatase 103 07/17/2018 10:24 AM    Alk. phosphatase 108 01/23/2018 09:03 AM    Alk. phosphatase 110 10/19/2017 09:10 AM    Alk. phosphatase 107 04/20/2017 08:24 AM    Alk.  phosphatase 90 12/21/2016 08:18 AM    Protein, total 6.5 07/17/2018 10:24 AM    Protein, total 6.7 01/23/2018 09:03 AM    Protein, total 6.7 10/19/2017 09:10 AM    Protein, total 6.2 04/20/2017 08:24 AM    Protein, total 6.3 12/21/2016 08:18 AM    Albumin 4.3 07/17/2018 10:24 AM    Albumin 4.4 01/23/2018 09:03 AM    Albumin 4.3 10/19/2017 09:10 AM    Albumin 4.1 04/20/2017 08:24 AM    Albumin 4.1 12/21/2016 08:18 AM    A-G Ratio 2.0 07/17/2018 10:24 AM    A-G Ratio 1.9 01/23/2018 09:03 AM    A-G Ratio 1.8 10/19/2017 09:10 AM    A-G Ratio 2.0 04/20/2017 08:24 AM    A-G Ratio 1.9 12/21/2016 08:18 AM    ALT (SGPT) 18 07/17/2018 10:24 AM    ALT (SGPT) 21 01/23/2018 09:03 AM    ALT (SGPT) 20 10/19/2017 09:10 AM    ALT (SGPT) 20 04/20/2017 08:24 AM    ALT (SGPT) 17 12/21/2016 08:18 AM     No results found for: CPK, CPKX, CPX  Lab Results   Component Value Date/Time    Cholesterol, total 141 07/17/2018 10:24 AM    Cholesterol, total 143 01/23/2018 09:03 AM    Cholesterol, total 149 10/19/2017 09:10 AM    Cholesterol, total 142 04/20/2017 08:24 AM    Cholesterol, total 134 12/21/2016 08:18 AM    HDL Cholesterol 44 07/17/2018 10:24 AM    HDL Cholesterol 36 (L) 01/23/2018 09:03 AM    HDL Cholesterol 43 10/19/2017 09:10 AM    HDL Cholesterol 39 (L) 04/20/2017 08:24 AM    HDL Cholesterol 44 12/21/2016 08:18 AM    LDL, calculated 70 07/17/2018 10:24 AM    LDL, calculated 75 01/23/2018 09:03 AM    LDL, calculated 79 10/19/2017 09:10 AM    LDL, calculated 70 04/20/2017 08:24 AM    LDL, calculated 77 12/21/2016 08:18 AM    Triglyceride 134 07/17/2018 10:24 AM    Triglyceride 158 (H) 01/23/2018 09:03 AM    Triglyceride 137 10/19/2017 09:10 AM    Triglyceride 164 (H) 04/20/2017 08:24 AM    Triglyceride 66 12/21/2016 08:18 AM     No results found for this or any previous visit. Assessment:         Patient Active Problem List    Diagnosis Date Noted    Type 2 diabetes mellitus with hyperglycemia, without long-term current use of insulin ([facility] 10/19/2017    Seizure ([facility]     GERD (gastroesophageal reflux disease) 07/15/2015    Orthostatic hypotension     Hypertension     Pure hypercholesterolemia      Continues to see PCP. Less fatigue now off the lamictal (on for essential tremor). Episode of weakness last week after working in yard, resolved--thinks he was [employer]". Plan:     Doing well with no adverse cardiac symptoms. Lipids and labs followed by PCP. Continue current care and f/u in 6 months.     Tai Evangelista MD

## 2018-08-30 NOTE — MR AVS SNAPSHOT
[address] 
 
 
 [address] [facility] [address] [id number] [phone] Patient: Roger Hedrick MRN: [id number] Visit Information Date & Time Provider Department Dept. Phone Encounter #  
 8/30/2018 11:20 AM Gregg Siddiqui MD [facility] [phone] [id number] Follow-up Instructions Return in about 6 months (around 2/28/2019). Follow-up and Disposition History Your Appointments 3/25/2019  3:20 PM  
ESTABLISHED PATIENT with Gregg Siddiqui MD  
[id number] [facility] [place] [facility] [place] [address]) Appt Note: 6 mo fu $0cp [address] [facility] [id number] [phone]  
  
   
 [address] [place], [place] [id number] Upcoming Health Maintenance Date Due Influenza Age 5 to Adult 8/1/2018 MEDICARE YEARLY EXAM 10/20/2018 FOOT EXAM Q1 10/30/2018 HEMOGLOBIN A1C Q6M 1/17/2019 MICROALBUMIN Q1 1/23/2019 LIPID PANEL Q1 7/17/2019 EYE EXAM RETINAL OR DILATED Q1 8/8/2019 GLAUCOMA SCREENING Q2Y 8/8/2020 DTaP/Tdap/Td series (2 - Td) 10/19/2027 Allergies as of 8/30/2018  Review Complete On: 8/30/2018 By: Gregg Siddiqui MD  
 No Known Allergies Current Immunizations  Reviewed on 10/19/2016 Name Date Influenza High Dose Vaccine PF 10/19/2017, 10/19/2016 Influenza Vaccine 10/17/2014, 10/11/2013  9:32 AM  
 Influenza Vaccine (Quad) 10/28/2015 10:28 AM  
 Pneumococcal Conjugate (PCV-13) 10/28/2015 10:28 AM  
 Pneumococcal Polysaccharide (PPSV-23) 10/11/2013  9:33 AM  
  
 Not reviewed this visit You Were Diagnosed With   
  
 Codes Comments Essential hypertension    -  Primary ICD-10-CM: I10 
ICD-9-CM: 401.9 Type 2 diabetes mellitus with hyperglycemia, without long-term current use of insulin (HCC)     ICD-10-CM: E11.65 ICD-9-CM: 250.00, 790.29 Heart palpitations     ICD-10-CM: R00.2 ICD-9-CM: 785.1 Orthostatic hypotension     ICD-10-CM: I95.1 ICD-9-CM: 458.0 Transient cerebral ischemia, unspecified type     ICD-10-CM: G45.9 ICD-9-CM: 435.9 Seizure ([address]     ICD-10-CM: R56.9 ICD-9-CM: 780.39 Gastroesophageal reflux disease without esophagitis     ICD-10-CM: K21.9 ICD-9-CM: 530.81 Vitals BP Pulse Resp Height(growth percentile) Weight(growth percentile) SpO2  
 114/68 (BP 1 Location: Right arm, BP Patient Position: Sitting) 65 12 5' 6\" (1.676 m) 173 lb (78.5 kg) 99% BMI Smoking Status 27.92 kg/m2 Current Every Day Smoker Vitals History BMI and BSA Data Body Mass Index Body Surface Area  
 27.92 kg/m 2 1.91 m 2 Preferred Pharmacy Pharmacy Name Phone [facility], [facility] [phone] Your Updated Medication List  
  
   
This list is accurate as of 8/30/18 12:23 PM.  Always use your most recent med list.  
  
  
  
  
 acetaminophen 500 mg tablet Commonly known as:  TYLENOL Take  by mouth every six (6) hours as needed. aspirin delayed-release 81 mg tablet Take 1 Tab by mouth daily. betamethasone valerate 0.1 % ointment Commonly known as:  Santosh Rand Apply  to affected area two (2) times a day. Blood-Glucose Meter monitoring kit Test blood sugar daily. Dx. E11.65, not on insulin  
  
 cinnamon bark-chromium picolin 500-100 mg-mcg Cap Take 1 Cap by mouth daily. Indications: DIABETES MELLITUS  
  
 CLARITIN 10 mg tablet Generic drug:  loratadine Take 10 mg by mouth daily. FIBER THERAPY (M-CELL/SUGAR) Powd Generic drug:  methylcellulose (laxative) Take  by mouth daily. glucose blood VI test strips strip Commonly known as:  blood glucose test  
Test blood sugar daily. Dx E11.65 not on insulin Iron 325 mg (65 mg iron) tablet Generic drug:  ferrous sulfate Take  by mouth Daily (before breakfast). Take 1 every other day metFORMIN  mg tablet Commonly known as:  GLUCOPHAGE XR  
TAKE 2 TABLETS DAILY pantoprazole 40 mg tablet Commonly known as:  PROTONIX  
TAKE 1 TABLET DAILY pindolol 5 mg tablet Commonly known as:  VISKIN  
TAKE 1 TABLET TWICE A DAY  
  
 pravastatin 20 mg tablet Commonly known as:  PRAVACHOL Take 1 Tab by mouth nightly. PROBIOTIC 4X 10-15 mg Tbec Generic drug:  B.infantis-B.ani-B.long-B.bifi Take  by mouth daily. QUININE SULFATE PO Take 300 mg by mouth as needed. We Performed the Following AMB POC EKG ROUTINE W/ 12 LEADS, INTER & REP [63345 CPT(R)] Follow-up Instructions Return in about 6 months (around 2/28/2019). Introducing [facility]! Dear Bin Barron: Thank you for requesting a [employer] account. Our records indicate that you already have an active [employer] account. You can access your account anytime at [email or web address] Did you know that you can access your hospital and ER discharge instructions at any time in [employer]? You can also review all of your test results from your hospital stay or ER visit. Additional Information If you have questions, please visit the Frequently Asked Questions section of the [employer] website at [email or web address]/. Remember, [employer] is NOT to be used for urgent needs. For medical emergencies, dial 911. Now available from your iPhone and Android! Please provide this summary of care documentation to your next provider. Your primary care clinician is listed as Alexa Moreno. If you have any questions after today's visit, please call [phone].

## 2018-11-16 RX ORDER — METFORMIN HYDROCHLORIDE 500 MG/1
TABLET, EXTENDED RELEASE ORAL
Qty: 180 TAB | Refills: 3 | Status: SHIPPED | OUTPATIENT
Start: 2018-11-16 | End: 2019-11-11 | Stop reason: SDUPTHER

## 2018-11-20 RX ORDER — PANTOPRAZOLE SODIUM 40 MG/1
TABLET, DELAYED RELEASE ORAL
Qty: 90 TAB | Refills: 1 | Status: SHIPPED | OUTPATIENT
Start: 2018-11-20 | End: 2019-05-19 | Stop reason: SDUPTHER

## 2018-11-29 RX ORDER — PRAVASTATIN SODIUM 20 MG/1
TABLET ORAL
Qty: 90 TAB | Refills: 4 | Status: SHIPPED | OUTPATIENT
Start: 2018-11-29 | End: 2020-01-02

## 2019-02-19 RX ORDER — PINDOLOL 5 MG/1
TABLET ORAL
Qty: 180 TAB | Refills: 1 | Status: SHIPPED | OUTPATIENT
Start: 2019-02-19 | End: 2019-08-18 | Stop reason: SDUPTHER

## 2019-03-25 ENCOUNTER — OFFICE VISIT (OUTPATIENT)
Dept: CARDIOLOGY CLINIC | Age: 84
End: 2019-03-25

## 2019-03-25 VITALS
DIASTOLIC BLOOD PRESSURE: 64 MMHG | WEIGHT: 176 LBS | HEIGHT: 66 IN | BODY MASS INDEX: 28.28 KG/M2 | SYSTOLIC BLOOD PRESSURE: 120 MMHG | RESPIRATION RATE: 18 BRPM | HEART RATE: 66 BPM | OXYGEN SATURATION: 98 %

## 2019-03-25 DIAGNOSIS — R00.2 HEART PALPITATIONS: ICD-10-CM

## 2019-03-25 DIAGNOSIS — E11.65 TYPE 2 DIABETES MELLITUS WITH HYPERGLYCEMIA, WITHOUT LONG-TERM CURRENT USE OF INSULIN (HCC): ICD-10-CM

## 2019-03-25 DIAGNOSIS — I10 ESSENTIAL HYPERTENSION: Primary | ICD-10-CM

## 2019-03-25 DIAGNOSIS — R56.9 SEIZURE (HCC): ICD-10-CM

## 2019-03-25 DIAGNOSIS — I95.1 ORTHOSTATIC HYPOTENSION: ICD-10-CM

## 2019-03-25 DIAGNOSIS — R42 DIZZINESS: ICD-10-CM

## 2019-03-25 DIAGNOSIS — K21.9 GASTROESOPHAGEAL REFLUX DISEASE WITHOUT ESOPHAGITIS: ICD-10-CM

## 2019-03-25 DIAGNOSIS — E78.00 PURE HYPERCHOLESTEROLEMIA: ICD-10-CM

## 2019-03-25 NOTE — PROGRESS NOTES
Tino Velez is a 80 y.o. male is here for routine f/u. No specific CV sx or complaints. Less fatigue off Lamictal. The patient denies chest pain/ shortness of breath, orthopnea, PND, LE edema, palpitations, syncope, presyncope or fatigue.        Patient Active Problem List    Diagnosis Date Noted    Type 2 diabetes mellitus with hyperglycemia, without long-term current use of insulin ([facility] 10/19/2017    [facility]     GERD (gastroesophageal reflux disease) 07/15/2015    Orthostatic hypotension     Hypertension     Pure hypercholesterolemia       Vinita Neil MD  Past Medical History:   Diagnosis Date    Diabetes mellitus, type 2 ([facility]     DJD (degenerative joint disease)     GERD (gastroesophageal reflux disease)     Hypertension     Orthostatic hypotension     Parasomnia     PUD (peptic ulcer disease)     Pure hypercholesterolemia     Seizure [facility])       Past Surgical History:   Procedure Laterality Date    ENDOSCOPY, COLON, DIAGNOSTIC  01/2012    HX CHOLECYSTECTOMY  02/2012    laparoscopic    HX GI  01/2012    endoscopy,BX    HX KNEE REPLACEMENT      Bilateral     No Known Allergies   Family History   Problem Relation Age of Onset    COPD Father     COPD Sister     COPD Sister       Social History     Socioeconomic History    Marital status:      Spouse name: Merari Smith Number of children: 4    Years of education: Not on file    Highest education level: Some college, no degree   Occupational History    Not on file   Social Needs    Financial resource strain: Not hard at all   [place] insecurity:     Worry: Never true     Inability: Never true   GB Environmental needs:     Medical: No     Non-medical: No   Tobacco Use    Smoking status: Current Every Day Smoker     Types: Cigars    Smokeless tobacco: Never Used   Substance and Sexual Activity    Alcohol use: No     Frequency: Never     Binge frequency: Never    Drug use: No    Sexual activity: Never Lifestyle    Physical activity:     Days per week: 7 days     Minutes per session: 30 min    Stress: Rather much   Relationships    Social connections:     Talks on phone: Never     Gets together: Never     Attends [employer] service: Never     Active member of club or organization: No     Attends meetings of clubs or organizations: Never     Relationship status:     Intimate partner violence:     Fear of current or ex partner: No     Emotionally abused: No     Physically abused: No     Forced sexual activity: No   Other Topics Concern    Not on file   Social History Narrative    Not on file      Current Outpatient Medications   Medication Sig    pindolol (VISKIN) 5 mg tablet TAKE 1 TABLET TWICE A DAY    pravastatin (PRAVACHOL) 20 mg tablet TAKE 1 TABLET NIGHTLY    pantoprazole (PROTONIX) 40 mg tablet TAKE 1 TABLET DAILY    metFORMIN ER (GLUCOPHAGE XR) 500 mg tablet TAKE 2 TABLETS DAILY    loratadine (CLARITIN) 10 mg tablet Take 10 mg by mouth daily.  Blood-Glucose Meter monitoring kit Test blood sugar daily. Dx. E11.65, not on insulin    glucose blood VI test strips (BLOOD GLUCOSE TEST) strip Test blood sugar daily. Dx E11.65 not on insulin    cinnamon bark-chromium picolin 500-100 mg-mcg cap Take 1 Cap by mouth daily. Indications: DIABETES MELLITUS    aspirin delayed-release 81 mg tablet Take 1 Tab by mouth daily.  QUININE SULFATE PO Take 300 mg by mouth as needed.  ferrous sulfate (IRON) 325 mg (65 mg iron) tablet Take  by mouth Daily (before breakfast). Take 1 every other day    betamethasone valerate (VALISONE) 0.1 % ointment Apply  to affected area two (2) times a day. (Patient taking differently: Apply  to affected area two (2) times daily as needed.)    methylcellulose, laxative, (FIBER THERAPY) Powd Take  by mouth daily.  acetaminophen (TYLENOL) 500 mg tablet Take  by mouth every six (6) hours as needed.     B.infantis-B.ani-B.long-B.bifi (PROBIOTIC 4X) 10-15 mg TbEC Take by mouth daily. No current facility-administered medications for this visit. Review of Symptoms:    CONST  No weight change. No fever, chills, sweats    ENT No visual changes, URI sx, sore throat    CV  See HPI   RESP  No cough, or sputum, wheezing. Also see HPI   GI  No abdominal pain or change in bowel habits. No heartburn or dysphagia. No melena or rectal bleeding.   No dysuria, urgency, frequency, hematuria   MSKEL  No joint pain, swelling. No muscle pain. SKIN  No rash or lesions. NEURO  No headache, syncope, or seizure. No weakness, loss of sensation, or paresthesias. PSYCH  No low mood or depression  No anxiety. HE/LYMPH  No easy bruising, abnormal bleeding, or enlarged glands.         Physical ExamPhysical Exam:    Visit Vitals  /64 (BP 1 Location: Right arm, BP Patient Position: Sitting)   Pulse 66   Resp 18   Ht 5' 6\" (1.676 m)   Wt 176 lb (79.8 kg)   SpO2 98% Comment: ra   BMI 28.41 kg/m²     Gen: NAD  HEENT:  PERRL, throat clear  Neck: no adenopathy, no thyromegaly, no JVD   Heart:  Regular,Nl S1S2,  no murmur, gallop or rub.   Lungs:  clear  Abdomen:   Soft, non-tender, bowel sounds are active.   Extremities:  No edema  Pulse: symmetric  Neuro: A&O times 3, No focal neuro deficits    Cardiographics      Labs:   Lab Results   Component Value Date/Time    Sodium 143 07/17/2018 10:24 AM    Sodium 141 01/23/2018 09:03 AM    Sodium 143 10/19/2017 09:10 AM    Sodium 142 04/20/2017 08:24 AM    Sodium 141 12/21/2016 08:18 AM    Potassium 4.9 07/17/2018 10:24 AM    Potassium 5.2 01/23/2018 09:03 AM    Potassium 4.8 10/19/2017 09:10 AM    Potassium 5.1 04/20/2017 08:24 AM    Potassium 4.9 12/21/2016 08:18 AM    Chloride 101 07/17/2018 10:24 AM    Chloride 101 01/23/2018 09:03 AM    Chloride 101 10/19/2017 09:10 AM    Chloride 101 04/20/2017 08:24 AM    Chloride 100 12/21/2016 08:18 AM    CO2 26 07/17/2018 10:24 AM    CO2 21 01/23/2018 09:03 AM    CO2 26 10/19/2017 09:10 AM    CO2 22 04/20/2017 08:24 AM    CO2 25 12/21/2016 08:18 AM    Glucose 145 (H) 07/17/2018 10:24 AM    Glucose 235 (H) 01/23/2018 09:03 AM    Glucose 230 (H) 10/19/2017 09:10 AM    Glucose 286 (H) 04/20/2017 08:24 AM    Glucose 184 (H) 12/21/2016 08:18 AM    BUN 17 07/17/2018 10:24 AM    BUN 21 01/23/2018 09:03 AM    BUN 15 10/19/2017 09:10 AM    BUN 12 04/20/2017 08:24 AM    BUN 25 12/21/2016 08:18 AM    Creatinine 1.19 07/17/2018 10:24 AM    Creatinine 1.19 01/23/2018 09:03 AM    Creatinine 1.08 10/19/2017 09:10 AM    Creatinine 1.20 04/20/2017 08:24 AM    Creatinine 1.38 (H) 12/21/2016 08:18 AM    BUN/Creatinine ratio 14 07/17/2018 10:24 AM    BUN/Creatinine ratio 18 01/23/2018 09:03 AM    BUN/Creatinine ratio 14 10/19/2017 09:10 AM    BUN/Creatinine ratio 10 04/20/2017 08:24 AM    BUN/Creatinine ratio 18 12/21/2016 08:18 AM    GFR est AA 64 07/17/2018 10:24 AM    GFR est AA 64 01/23/2018 09:03 AM    GFR est AA 72 10/19/2017 09:10 AM    GFR est AA 63 04/20/2017 08:24 AM    GFR est AA 54 (L) 12/21/2016 08:18 AM    GFR est non-AA 55 (L) 07/17/2018 10:24 AM    GFR est non-AA 55 (L) 01/23/2018 09:03 AM    GFR est non-AA 62 10/19/2017 09:10 AM    GFR est non-AA 55 (L) 04/20/2017 08:24 AM    GFR est non-AA 47 (L) 12/21/2016 08:18 AM    Calcium 9.4 07/17/2018 10:24 AM    Calcium 8.9 01/23/2018 09:03 AM    Calcium 9.1 10/19/2017 09:10 AM    Calcium 8.7 04/20/2017 08:24 AM    Calcium 8.6 12/21/2016 08:18 AM    Bilirubin, total 0.3 07/17/2018 10:24 AM    Bilirubin, total 0.4 01/23/2018 09:03 AM    Bilirubin, total 0.3 10/19/2017 09:10 AM    Bilirubin, total 0.3 04/20/2017 08:24 AM    Bilirubin, total 0.7 12/21/2016 08:18 AM    AST (SGOT) 24 07/17/2018 10:24 AM    AST (SGOT) 28 01/23/2018 09:03 AM    AST (SGOT) 23 10/19/2017 09:10 AM    AST (SGOT) 25 04/20/2017 08:24 AM    AST (SGOT) 28 12/21/2016 08:18 AM    Alk. phosphatase 103 07/17/2018 10:24 AM    Alk. phosphatase 108 01/23/2018 09:03 AM    Alk.  phosphatase 110 10/19/2017 09:10 AM    Alk. phosphatase 107 04/20/2017 08:24 AM    Alk. phosphatase 90 12/21/2016 08:18 AM    Protein, total 6.5 07/17/2018 10:24 AM    Protein, total 6.7 01/23/2018 09:03 AM    Protein, total 6.7 10/19/2017 09:10 AM    Protein, total 6.2 04/20/2017 08:24 AM    Protein, total 6.3 12/21/2016 08:18 AM    Albumin 4.3 07/17/2018 10:24 AM    Albumin 4.4 01/23/2018 09:03 AM    Albumin 4.3 10/19/2017 09:10 AM    Albumin 4.1 04/20/2017 08:24 AM    Albumin 4.1 12/21/2016 08:18 AM    A-G Ratio 2.0 07/17/2018 10:24 AM    A-G Ratio 1.9 01/23/2018 09:03 AM    A-G Ratio 1.8 10/19/2017 09:10 AM    A-G Ratio 2.0 04/20/2017 08:24 AM    A-G Ratio 1.9 12/21/2016 08:18 AM    ALT (SGPT) 18 07/17/2018 10:24 AM    ALT (SGPT) 21 01/23/2018 09:03 AM    ALT (SGPT) 20 10/19/2017 09:10 AM    ALT (SGPT) 20 04/20/2017 08:24 AM    ALT (SGPT) 17 12/21/2016 08:18 AM     No results found for: CPK, CPKX, CPX  Lab Results   Component Value Date/Time    Cholesterol, total 133 12/03/2018 01:16 PM    Cholesterol, total 141 07/17/2018 10:24 AM    Cholesterol, total 143 01/23/2018 09:03 AM    Cholesterol, total 149 10/19/2017 09:10 AM    Cholesterol, total 142 04/20/2017 08:24 AM    HDL Cholesterol 39 (L) 12/03/2018 01:16 PM    HDL Cholesterol 44 07/17/2018 10:24 AM    HDL Cholesterol 36 (L) 01/23/2018 09:03 AM    HDL Cholesterol 43 10/19/2017 09:10 AM    HDL Cholesterol 39 (L) 04/20/2017 08:24 AM    LDL, calculated 67 12/03/2018 01:16 PM    LDL, calculated 70 07/17/2018 10:24 AM    LDL, calculated 75 01/23/2018 09:03 AM    LDL, calculated 79 10/19/2017 09:10 AM    LDL, calculated 70 04/20/2017 08:24 AM    Triglyceride 134 12/03/2018 01:16 PM    Triglyceride 134 07/17/2018 10:24 AM    Triglyceride 158 (H) 01/23/2018 09:03 AM    Triglyceride 137 10/19/2017 09:10 AM    Triglyceride 164 (H) 04/20/2017 08:24 AM     No results found for this or any previous visit.     Assessment:         Patient Active Problem List    Diagnosis Date Noted    Type 2 diabetes mellitus with hyperglycemia, without long-term current use of insulin ([facility] 10/19/2017    Seizure ([facility]     GERD (gastroesophageal reflux disease) 07/15/2015    Orthostatic hypotension     Hypertension     Pure hypercholesterolemia         Plan:     Doing well with no adverse cardiac symptoms. Lipids and labs followed by PCP. Continue current care and f/u in 6 months.     Cezar Alonso MD

## 2019-03-25 NOTE — PROGRESS NOTES
Verified patient with two patient identifiers. Medications reviewed/approved by Dr. Antoine Suarez. A verbal from Dr. Antoine Suarez was given to remove any medications that were deleted during the visit. Medication(s) removed:none    Chief Complaint   Patient presents with    Hypertension     6 month follow up     1. Have you been to the ER, urgent care clinic since your last visit? Hospitalized since your last visit?no    2. Have you seen or consulted any other health care providers outside of the [address] [place] [address] since your last visit? Include any pap smears or colon screening. no

## 2019-08-19 RX ORDER — PINDOLOL 5 MG/1
TABLET ORAL
Qty: 180 TAB | Refills: 4 | Status: SHIPPED | OUTPATIENT
Start: 2019-08-19 | End: 2020-03-10 | Stop reason: SDUPTHER

## 2019-09-24 ENCOUNTER — OFFICE VISIT (OUTPATIENT)
Dept: CARDIOLOGY CLINIC | Age: 84
End: 2019-09-24

## 2019-09-24 VITALS
BODY MASS INDEX: 26.84 KG/M2 | OXYGEN SATURATION: 97 % | HEART RATE: 63 BPM | HEIGHT: 66 IN | RESPIRATION RATE: 14 BRPM | SYSTOLIC BLOOD PRESSURE: 104 MMHG | DIASTOLIC BLOOD PRESSURE: 62 MMHG | WEIGHT: 167 LBS

## 2019-09-24 DIAGNOSIS — E11.65 TYPE 2 DIABETES MELLITUS WITH HYPERGLYCEMIA, WITHOUT LONG-TERM CURRENT USE OF INSULIN (HCC): ICD-10-CM

## 2019-09-24 DIAGNOSIS — I95.1 ORTHOSTATIC HYPOTENSION: ICD-10-CM

## 2019-09-24 DIAGNOSIS — R56.9 SEIZURE (HCC): ICD-10-CM

## 2019-09-24 DIAGNOSIS — R42 DIZZINESS: ICD-10-CM

## 2019-09-24 DIAGNOSIS — G45.9 TRANSIENT CEREBRAL ISCHEMIA, UNSPECIFIED TYPE: ICD-10-CM

## 2019-09-24 DIAGNOSIS — I10 ESSENTIAL HYPERTENSION: Primary | ICD-10-CM

## 2019-09-24 DIAGNOSIS — E78.00 PURE HYPERCHOLESTEROLEMIA: ICD-10-CM

## 2019-09-24 NOTE — PROGRESS NOTES
Verified patient with two patient identifiers. Medications reviewed/approved by Dr. Janette Regalado. A verbal order from Dr. Janette Regalado was received with [facility] to remove any medications that were deleted during the visit. Medication(s) removed:  None    Chief Complaint   Patient presents with    Hypertension     6 month follow up     1. Have you been to the ER, urgent care clinic since your last visit? Hospitalized since your last visit?no    2. Have you seen or consulted any other health care providers outside of the [address] [place] [address] since your last visit? Include any pap smears or colon screening.  no

## 2019-09-24 NOTE — PROGRESS NOTES
Bob Severs is a 80 y.o. male is here for routine f/u. No specific CV sx or complaints. Less fatigue off Lamictal.The patient denies chest pain/ shortness of breath, orthopnea, PND, LE edema, palpitations, syncope, presyncope or fatigue.        Patient Active Problem List    Diagnosis Date Noted    Type 2 diabetes mellitus with hyperglycemia, without long-term current use of insulin ([facility] 10/19/2017    Seizure ([facility]     GERD (gastroesophageal reflux disease) 07/15/2015    Orthostatic hypotension     Hypertension     Pure hypercholesterolemia       Norma Rea MD  Past Medical History:   Diagnosis Date    Diabetes mellitus, type 2 ([facility]     DJD (degenerative joint disease)     GERD (gastroesophageal reflux disease)     Hypertension     Orthostatic hypotension     Parasomnia     PUD (peptic ulcer disease)     Pure hypercholesterolemia     [facility]ure [facility])       Past Surgical History:   Procedure Laterality Date    ENDOSCOPY, COLON, DIAGNOSTIC  01/2012    HX CHOLECYSTECTOMY  02/2012    laparoscopic    HX GI  01/2012    endoscopy,BX    HX KNEE REPLACEMENT      Bilateral     No Known Allergies   Family History   Problem Relation Age of Onset    COPD Father     COPD Sister     COPD Sister       Social History     Socioeconomic History    Marital status:      Spouse name: Radha Franco Number of children: 4    Years of education: Not on file    Highest education level: Some college, no degree   Occupational History    Not on file   Social Needs    Financial resource strain: Not hard at all   [place] insecurity:     Worry: Never true     Inability: Never true   [employer] needs:     Medical: No     Non-medical: No   Tobacco Use    Smoking status: Current Every Day Smoker     Types: Cigars    Smokeless tobacco: Never Used   Substance and Sexual Activity    Alcohol use: No     Frequency: Never     Binge frequency: Never    Drug use: No    Sexual activity: Never Lifestyle    Physical activity:     Days per week: 7 days     Minutes per session: 30 min    Stress: Rather much   Relationships    Social connections:     Talks on phone: Never     Gets together: Never     Attends Zoroastrian service: Never     Active member of club or organization: No     Attends meetings of clubs or organizations: Never     Relationship status:     Intimate partner violence:     Fear of current or ex partner: No     Emotionally abused: No     Physically abused: No     Forced sexual activity: No   Other Topics Concern    Not on file   Social History Narrative    Not on file      Current Outpatient Medications   Medication Sig    MAGNESIUM CITRATE PO Take  by mouth.  pindolol (VISKIN) 5 mg tablet TAKE 1 TABLET TWICE A DAY    fish oil-omega-3 fatty acids 300-500 mg cap Take  by mouth.  pantoprazole (PROTONIX) 40 mg tablet TAKE 1 TABLET DAILY    pravastatin (PRAVACHOL) 20 mg tablet TAKE 1 TABLET NIGHTLY    metFORMIN ER (GLUCOPHAGE XR) 500 mg tablet TAKE 2 TABLETS DAILY    loratadine (CLARITIN) 10 mg tablet Take 10 mg by mouth daily.  Blood-Glucose Meter monitoring kit Test blood sugar daily. Dx. E11.65, not on insulin    glucose blood VI test strips (BLOOD GLUCOSE TEST) strip Test blood sugar daily. Dx E11.65 not on insulin    cinnamon bark-chromium picolin 500-100 mg-mcg cap Take 1 Cap by mouth daily. Indications: DIABETES MELLITUS    aspirin delayed-release 81 mg tablet Take 1 Tab by mouth daily.  ferrous sulfate (IRON) 325 mg (65 mg iron) tablet Take  by mouth Daily (before breakfast). Take 1 every other day    betamethasone valerate (VALISONE) 0.1 % ointment Apply  to affected area two (2) times a day. (Patient taking differently: Apply  to affected area two (2) times daily as needed.)    methylcellulose, laxative, (FIBER THERAPY) Powd Take  by mouth daily.  acetaminophen (TYLENOL) 500 mg tablet Take  by mouth every six (6) hours as needed.     B.infantis-B.ani-B.long-B.bifi (PROBIOTIC 4X) 10-15 mg TbEC Take  by mouth daily.  QUININE SULFATE PO Take 300 mg by mouth as needed. No current facility-administered medications for this visit. Review of Symptoms:    CONST  No weight change. No fever, chills, sweats    ENT No visual changes, URI sx, sore throat    CV  See HPI   RESP  No cough, or sputum, wheezing. Also see HPI   GI  No abdominal pain or change in bowel habits. No heartburn or dysphagia. No melena or rectal bleeding.   No dysuria, urgency, frequency, hematuria   MSKEL  No joint pain, swelling. No muscle pain. SKIN  No rash or lesions. NEURO  No headache, syncope, or seizure. No weakness, loss of sensation, or paresthesias. PSYCH  No low mood or depression  No anxiety. HE/LYMPH  No easy bruising, abnormal bleeding, or enlarged glands.         Physical ExamPhysical Exam:    Visit Vitals  /62 (BP 1 Location: Left arm, BP Patient Position: Sitting)   Pulse 63   Resp 14   Ht 5' 6\" (1.676 m)   Wt 167 lb (75.8 kg)   SpO2 97% Comment: RA   BMI 26.95 kg/m²     Gen: NAD  HEENT:  PERRL, throat clear  Neck: no adenopathy, no thyromegaly, no JVD   Heart:  Regular,Nl S1S2,  no murmur, gallop or rub.   Lungs:  clear  Abdomen:   Soft, non-tender, bowel sounds are active.   Extremities:  No edema  Pulse: symmetric  Neuro: A&O times 3, No focal neuro deficits    Cardiographics    ECG: NSR, PAC, wnl      Labs:   Lab Results   Component Value Date/Time    Sodium 143 07/17/2018 10:24 AM    Sodium 141 01/23/2018 09:03 AM    Sodium 143 10/19/2017 09:10 AM    Sodium 142 04/20/2017 08:24 AM    Sodium 141 12/21/2016 08:18 AM    Potassium 4.9 07/17/2018 10:24 AM    Potassium 5.2 01/23/2018 09:03 AM    Potassium 4.8 10/19/2017 09:10 AM    Potassium 5.1 04/20/2017 08:24 AM    Potassium 4.9 12/21/2016 08:18 AM    Chloride 101 07/17/2018 10:24 AM    Chloride 101 01/23/2018 09:03 AM    Chloride 101 10/19/2017 09:10 AM    Chloride 101 04/20/2017 08:24 AM    Chloride 100 12/21/2016 08:18 AM    CO2 26 07/17/2018 10:24 AM    CO2 21 01/23/2018 09:03 AM    CO2 26 10/19/2017 09:10 AM    CO2 22 04/20/2017 08:24 AM    CO2 25 12/21/2016 08:18 AM    Glucose 145 (H) 07/17/2018 10:24 AM    Glucose 235 (H) 01/23/2018 09:03 AM    Glucose 230 (H) 10/19/2017 09:10 AM    Glucose 286 (H) 04/20/2017 08:24 AM    Glucose 184 (H) 12/21/2016 08:18 AM    BUN 17 07/17/2018 10:24 AM    BUN 21 01/23/2018 09:03 AM    BUN 15 10/19/2017 09:10 AM    BUN 12 04/20/2017 08:24 AM    BUN 25 12/21/2016 08:18 AM    Creatinine 1.19 07/17/2018 10:24 AM    Creatinine 1.19 01/23/2018 09:03 AM    Creatinine 1.08 10/19/2017 09:10 AM    Creatinine 1.20 04/20/2017 08:24 AM    Creatinine 1.38 (H) 12/21/2016 08:18 AM    BUN/Creatinine ratio 14 07/17/2018 10:24 AM    BUN/Creatinine ratio 18 01/23/2018 09:03 AM    BUN/Creatinine ratio 14 10/19/2017 09:10 AM    BUN/Creatinine ratio 10 04/20/2017 08:24 AM    BUN/Creatinine ratio 18 12/21/2016 08:18 AM    GFR est AA 64 07/17/2018 10:24 AM    GFR est AA 64 01/23/2018 09:03 AM    GFR est AA 72 10/19/2017 09:10 AM    GFR est AA 63 04/20/2017 08:24 AM    GFR est AA 54 (L) 12/21/2016 08:18 AM    GFR est non-AA 55 (L) 07/17/2018 10:24 AM    GFR est non-AA 55 (L) 01/23/2018 09:03 AM    GFR est non-AA 62 10/19/2017 09:10 AM    GFR est non-AA 55 (L) 04/20/2017 08:24 AM    GFR est non-AA 47 (L) 12/21/2016 08:18 AM    Calcium 9.4 07/17/2018 10:24 AM    Calcium 8.9 01/23/2018 09:03 AM    Calcium 9.1 10/19/2017 09:10 AM    Calcium 8.7 04/20/2017 08:24 AM    Calcium 8.6 12/21/2016 08:18 AM    Bilirubin, total 0.3 07/17/2018 10:24 AM    Bilirubin, total 0.4 01/23/2018 09:03 AM    Bilirubin, total 0.3 10/19/2017 09:10 AM    Bilirubin, total 0.3 04/20/2017 08:24 AM    Bilirubin, total 0.7 12/21/2016 08:18 AM    AST (SGOT) 24 07/17/2018 10:24 AM    AST (SGOT) 28 01/23/2018 09:03 AM    AST (SGOT) 23 10/19/2017 09:10 AM    AST (SGOT) 25 04/20/2017 08:24 AM    AST (SGOT) 28 12/21/2016 08:18 AM    Alk. phosphatase 103 07/17/2018 10:24 AM    Alk. phosphatase 108 01/23/2018 09:03 AM    Alk. phosphatase 110 10/19/2017 09:10 AM    Alk. phosphatase 107 04/20/2017 08:24 AM    Alk.  phosphatase 90 12/21/2016 08:18 AM    Protein, total 6.5 07/17/2018 10:24 AM    Protein, total 6.7 01/23/2018 09:03 AM    Protein, total 6.7 10/19/2017 09:10 AM    Protein, total 6.2 04/20/2017 08:24 AM    Protein, total 6.3 12/21/2016 08:18 AM    Albumin 4.3 07/17/2018 10:24 AM    Albumin 4.4 01/23/2018 09:03 AM    Albumin 4.3 10/19/2017 09:10 AM    Albumin 4.1 04/20/2017 08:24 AM    Albumin 4.1 12/21/2016 08:18 AM    A-G Ratio 2.0 07/17/2018 10:24 AM    A-G Ratio 1.9 01/23/2018 09:03 AM    A-G Ratio 1.8 10/19/2017 09:10 AM    A-G Ratio 2.0 04/20/2017 08:24 AM    A-G Ratio 1.9 12/21/2016 08:18 AM    ALT (SGPT) 18 07/17/2018 10:24 AM    ALT (SGPT) 21 01/23/2018 09:03 AM    ALT (SGPT) 20 10/19/2017 09:10 AM    ALT (SGPT) 20 04/20/2017 08:24 AM    ALT (SGPT) 17 12/21/2016 08:18 AM     No results found for: CPK, CPKX, CPX  Lab Results   Component Value Date/Time    Cholesterol, total 133 12/03/2018 01:16 PM    Cholesterol, total 141 07/17/2018 10:24 AM    Cholesterol, total 143 01/23/2018 09:03 AM    Cholesterol, total 149 10/19/2017 09:10 AM    Cholesterol, total 142 04/20/2017 08:24 AM    HDL Cholesterol 39 (L) 12/03/2018 01:16 PM    HDL Cholesterol 44 07/17/2018 10:24 AM    HDL Cholesterol 36 (L) 01/23/2018 09:03 AM    HDL Cholesterol 43 10/19/2017 09:10 AM    HDL Cholesterol 39 (L) 04/20/2017 08:24 AM    LDL, calculated 67 12/03/2018 01:16 PM    LDL, calculated 70 07/17/2018 10:24 AM    LDL, calculated 75 01/23/2018 09:03 AM    LDL, calculated 79 10/19/2017 09:10 AM    LDL, calculated 70 04/20/2017 08:24 AM    Triglyceride 134 12/03/2018 01:16 PM    Triglyceride 134 07/17/2018 10:24 AM    Triglyceride 158 (H) 01/23/2018 09:03 AM    Triglyceride 137 10/19/2017 09:10 AM    Triglyceride 164 (H) 04/20/2017 08:24 AM     No results found for this or any previous visit. Assessment:         Patient Active Problem List    Diagnosis Date Noted    Type 2 diabetes mellitus with hyperglycemia, without long-term current use of insulin ([facility] 10/19/2017    Seizure ([facility]     GERD (gastroesophageal reflux disease) 07/15/2015    Orthostatic hypotension     Hypertension     Pure hypercholesterolemia         Plan:     Doing well with no adverse cardiac symptoms. Lipids and labs followed by PCP. Continue current care and f/u in 6 months.     Nolvia Berkowitz MD

## 2020-03-10 RX ORDER — PINDOLOL 5 MG/1
TABLET ORAL
Qty: 180 TAB | Refills: 1 | Status: SHIPPED | OUTPATIENT
Start: 2020-03-10 | End: 2020-07-16

## 2020-03-12 ENCOUNTER — TELEPHONE (OUTPATIENT)
Dept: CARDIOLOGY CLINIC | Age: 85
End: 2020-03-12

## 2020-03-12 NOTE — TELEPHONE ENCOUNTER
Spoke with the patient. Verified patient with two patient identifiers. [facility] is no longer dispensing pindolol. Pt uses veterans benefits and asking for a substitute. Will discuss with Dr. Yury Lugo. Patient verbalized understanding. Per Dr Yury Lugo: We can try metoprolol tartrate 25mg bid, see how he does. [facility]     Pt has been informed. Verified patient with two patient identifiers. Verbal order per Dr. Yury Lugo. Order (medication, dose, route, frequency, amount, refills) repeated and verified twice.

## 2020-03-13 RX ORDER — METOPROLOL TARTRATE 25 MG/1
25 TABLET, FILM COATED ORAL 2 TIMES DAILY
Qty: 180 TAB | Refills: 1 | Status: SHIPPED | OUTPATIENT
Start: 2020-03-13 | End: 2020-08-25

## 2020-03-13 NOTE — TELEPHONE ENCOUNTER
metoprolol tartrate (LOPRESSOR) 25 mg tablet [[id number]]     Order Details   Dose: 25 mg Route: Oral Frequency: 2 TIMES DAILY   Note to Pharmacy:   Pindolol d/c'd. Dispense Quantity: 180 Tab Refills: 1 Fills remaining: --           Sig: Take 1 Tab by mouth two (2) times a day. Verbal order per Dr. Aubrey Goyal. Order (medication, dose, route, frequency, amount, refills) repeated and verified twice.

## 2020-07-16 PROBLEM — E11.21 TYPE 2 DIABETES WITH NEPHROPATHY (HCC): Status: ACTIVE | Noted: 2020-07-16

## 2020-07-20 ENCOUNTER — VIRTUAL VISIT (OUTPATIENT)
Dept: CARDIOLOGY CLINIC | Age: 85
End: 2020-07-20

## 2020-07-20 DIAGNOSIS — K21.9 GASTROESOPHAGEAL REFLUX DISEASE WITHOUT ESOPHAGITIS: ICD-10-CM

## 2020-07-20 DIAGNOSIS — E78.00 PURE HYPERCHOLESTEROLEMIA: ICD-10-CM

## 2020-07-20 DIAGNOSIS — I10 ESSENTIAL HYPERTENSION: Primary | ICD-10-CM

## 2020-07-20 DIAGNOSIS — I95.1 ORTHOSTATIC HYPOTENSION: ICD-10-CM

## 2020-07-20 DIAGNOSIS — R56.9 SEIZURE (HCC): ICD-10-CM

## 2020-07-20 DIAGNOSIS — E11.21 TYPE 2 DIABETES WITH NEPHROPATHY (HCC): ICD-10-CM

## 2020-07-20 NOTE — PROGRESS NOTES
Verified patient with two patient identifiers. Medications reviewed/approved by Dr. Lillian Santos. 1. Have you been to the ER, urgent care clinic since your last visit? Hospitalized since your last visit?no    2. Have you seen or consulted any other health care providers outside of the [address] [place] [address] since your last visit? Include any pap smears or colon screening.  no

## 2020-07-20 NOTE — PROGRESS NOTES
The patient was seen by me today by telephone visit to substitute for in-person evaluation (COVID 19 issus). Jay Stewart is a 80 y.o. male is here for routine f/u. No current CV sx or complaints. Continues to see PCP with recent OV, labs. BP had been running low, metoprolol down to 12.5mg bid (replaced pindolol). The patient denies chest pain/ shortness of breath, orthopnea, PND, LE edema, palpitations, syncope, presyncope or fatigue.        Patient Active Problem List    Diagnosis Date Noted    Type 2 diabetes with nephropathy ([facility] [address] 07/16/2020    Type 2 diabetes mellitus with hyperglycemia, without long-term current use of insulin ([facility] 10/19/2017    [email or web address]     GERD (gastroesophageal reflux disease) 07/15/2015    Orthostatic hypotension     Hypertension     Pure hypercholesterolemia       Julia Daley MD  Past Medical History:   Diagnosis Date    Diabetes mellitus, type 2 ([facility]     DJD (degenerative joint disease)     GERD (gastroesophageal reflux disease)     Hypertension     Orthostatic hypotension     Parasomnia     PUD (peptic ulcer disease)     Pure hypercholesterolemia     Seizure [facility])       Past Surgical History:   Procedure Laterality Date    ENDOSCOPY, COLON, DIAGNOSTIC  01/2012    HX CHOLECYSTECTOMY  02/2012    laparoscopic    HX GI  01/2012    endoscopy,BX    HX KNEE REPLACEMENT      Bilateral     No Known Allergies   Family History   Problem Relation Age of Onset    COPD Father     COPD Sister     COPD Sister       Social History     Socioeconomic History    Marital status:      Spouse name: Perez Siegel Number of children: 4    Years of education: Not on file    Highest education level: Some college, no degree   Occupational History    Not on file   Social Needs    Financial resource strain: Not hard at all   [address] insecurity     Worry: Never true     Inability: Never true   [employer] needs     Medical: No     Non-medical: No Tobacco Use    Smoking status: Current Every Day Smoker     Types: Cigars    Smokeless tobacco: Never Used   Substance and Sexual Activity    Alcohol use: No     Frequency: Never     Binge frequency: Never    Drug use: No    Sexual activity: Not Currently   Lifestyle    Physical activity     Days per week: 7 days     Minutes per session: 30 min    Stress: Rather much   Relationships    Social connections     Talks on phone: Never     Gets together: Never     Attends Sabianist service: Never     Active member of club or organization: No     Attends meetings of clubs or organizations: Never     Relationship status:     Intimate partner violence     Fear of current or ex partner: No     Emotionally abused: No     Physically abused: No     Forced sexual activity: No   Other Topics Concern    Not on file   Social History Narrative    Not on file      Current Outpatient Medications   Medication Sig    metFORMIN (GLUCOPHAGE) 500 mg tablet Take 1 Tab by mouth two (2) times daily (with meals). Indications: type 2 diabetes mellitus    metoprolol tartrate (LOPRESSOR) 25 mg tablet Take 1 Tab by mouth two (2) times a day.  pravastatin (PRAVACHOL) 20 mg tablet TAKE 1 TABLET NIGHTLY    pantoprazole (PROTONIX) 40 mg tablet TAKE 1 TABLET DAILY    MAGNESIUM CITRATE PO Take  by mouth.  fish oil-omega-3 fatty acids 300-500 mg cap Take  by mouth.  loratadine (CLARITIN) 10 mg tablet Take 10 mg by mouth daily.  Blood-Glucose Meter monitoring kit Test blood sugar daily. Dx. E11.65, not on insulin    glucose blood VI test strips (BLOOD GLUCOSE TEST) strip Test blood sugar daily. Dx E11.65 not on insulin    cinnamon bark-chromium picolin 500-100 mg-mcg cap Take 1 Cap by mouth daily. Indications: DIABETES MELLITUS    aspirin delayed-release 81 mg tablet Take 1 Tab by mouth daily.  ferrous sulfate (IRON) 325 mg (65 mg iron) tablet Take  by mouth Daily (before breakfast).  Take 1 every other day    betamethasone valerate (VALISONE) 0.1 % ointment Apply  to affected area two (2) times a day. (Patient taking differently: Apply  to affected area two (2) times daily as needed.)    acetaminophen (TYLENOL) 500 mg tablet Take  by mouth every six (6) hours as needed.  B.infantis-B.ani-B.long-B.bifi (PROBIOTIC 4X) 10-15 mg TbEC Take  by mouth daily. No current facility-administered medications for this visit. Review of Symptoms:    CONST  No weight change. No fever, chills, sweats    ENT No visual changes, URI sx, sore throat    CV  See HPI   RESP  No cough, or sputum, wheezing. Also see HPI   GI  No abdominal pain or change in bowel habits. No heartburn or dysphagia. No melena or rectal bleeding.   No dysuria, urgency, frequency, hematuria   MSKEL  No joint pain, swelling. No muscle pain. SKIN  No rash or lesions. NEURO  No headache, syncope, or seizure. No weakness, loss of sensation, or paresthesias. PSYCH  No low mood or depression  No anxiety. HE/LYMPH  No easy bruising, abnormal bleeding, or enlarged glands.         Labs:   Lab Results   Component Value Date/Time    Sodium 141 07/16/2020 09:44 AM    Sodium 141 03/19/2020 09:39 AM    Sodium 143 07/17/2018 10:24 AM    Sodium 141 01/23/2018 09:03 AM    Sodium 143 10/19/2017 09:10 AM    Potassium 4.6 07/16/2020 09:44 AM    Potassium 4.3 03/19/2020 09:39 AM    Potassium 4.9 07/17/2018 10:24 AM    Potassium 5.2 01/23/2018 09:03 AM    Potassium 4.8 10/19/2017 09:10 AM    Chloride 102 07/16/2020 09:44 AM    Chloride 102 03/19/2020 09:39 AM    Chloride 101 07/17/2018 10:24 AM    Chloride 101 01/23/2018 09:03 AM    Chloride 101 10/19/2017 09:10 AM    CO2 24 07/16/2020 09:44 AM    CO2 24 03/19/2020 09:39 AM    CO2 26 07/17/2018 10:24 AM    CO2 21 01/23/2018 09:03 AM    CO2 26 10/19/2017 09:10 AM    Glucose 106 (H) 07/16/2020 09:44 AM    Glucose 159 (H) 03/19/2020 09:39 AM    Glucose 145 (H) 07/17/2018 10:24 AM    Glucose 235 (H) 01/23/2018 09:03 AM    Glucose 230 (H) 10/19/2017 09:10 AM    BUN 17 07/16/2020 09:44 AM    BUN 18 03/19/2020 09:39 AM    BUN 17 07/17/2018 10:24 AM    BUN 21 01/23/2018 09:03 AM    BUN 15 10/19/2017 09:10 AM    Creatinine 1.01 07/16/2020 09:44 AM    Creatinine 1.23 03/19/2020 09:39 AM    Creatinine 1.19 07/17/2018 10:24 AM    Creatinine 1.19 01/23/2018 09:03 AM    Creatinine 1.08 10/19/2017 09:10 AM    BUN/Creatinine ratio 17 07/16/2020 09:44 AM    BUN/Creatinine ratio 15 03/19/2020 09:39 AM    BUN/Creatinine ratio 14 07/17/2018 10:24 AM    BUN/Creatinine ratio 18 01/23/2018 09:03 AM    BUN/Creatinine ratio 14 10/19/2017 09:10 AM    GFR est AA 76 07/16/2020 09:44 AM    GFR est AA 61 03/19/2020 09:39 AM    GFR est AA 64 07/17/2018 10:24 AM    GFR est AA 64 01/23/2018 09:03 AM    GFR est AA 72 10/19/2017 09:10 AM    GFR est non-AA 66 07/16/2020 09:44 AM    GFR est non-AA 52 (L) 03/19/2020 09:39 AM    GFR est non-AA 55 (L) 07/17/2018 10:24 AM    GFR est non-AA 55 (L) 01/23/2018 09:03 AM    GFR est non-AA 62 10/19/2017 09:10 AM    Calcium 9.1 07/16/2020 09:44 AM    Calcium 9.1 03/19/2020 09:39 AM    Calcium 9.4 07/17/2018 10:24 AM    Calcium 8.9 01/23/2018 09:03 AM    Calcium 9.1 10/19/2017 09:10 AM    Bilirubin, total 0.3 07/16/2020 09:44 AM    Bilirubin, total 0.4 03/19/2020 09:39 AM    Bilirubin, total 0.3 07/17/2018 10:24 AM    Bilirubin, total 0.4 01/23/2018 09:03 AM    Bilirubin, total 0.3 10/19/2017 09:10 AM    Alk. phosphatase 87 07/16/2020 09:44 AM    Alk. phosphatase 91 03/19/2020 09:39 AM    Alk. phosphatase 103 07/17/2018 10:24 AM    Alk. phosphatase 108 01/23/2018 09:03 AM    Alk.  phosphatase 110 10/19/2017 09:10 AM    Protein, total 6.0 07/16/2020 09:44 AM    Protein, total 6.2 03/19/2020 09:39 AM    Protein, total 6.5 07/17/2018 10:24 AM    Protein, total 6.7 01/23/2018 09:03 AM    Protein, total 6.7 10/19/2017 09:10 AM    Albumin 4.3 07/16/2020 09:44 AM    Albumin 4.2 03/19/2020 09:39 AM    Albumin 4.3 07/17/2018 10:24 AM    Albumin 4.4 01/23/2018 09:03 AM    Albumin 4.3 10/19/2017 09:10 AM    A-G Ratio 2.5 (H) 07/16/2020 09:44 AM    A-G Ratio 2.1 03/19/2020 09:39 AM    A-G Ratio 2.0 07/17/2018 10:24 AM    A-G Ratio 1.9 01/23/2018 09:03 AM    A-G Ratio 1.8 10/19/2017 09:10 AM    ALT (SGPT) 16 07/16/2020 09:44 AM    ALT (SGPT) 16 03/19/2020 09:39 AM    ALT (SGPT) 18 07/17/2018 10:24 AM    ALT (SGPT) 21 01/23/2018 09:03 AM    ALT (SGPT) 20 10/19/2017 09:10 AM     No results found for: CPK, CPKX, CPX  Lab Results   Component Value Date/Time    Cholesterol, total 132 07/16/2020 09:44 AM    Cholesterol, total 127 03/19/2020 09:39 AM    Cholesterol, total 133 12/03/2018 01:16 PM    Cholesterol, total 141 07/17/2018 10:24 AM    Cholesterol, total 143 01/23/2018 09:03 AM    HDL Cholesterol 42 07/16/2020 09:44 AM    HDL Cholesterol 47 03/19/2020 09:39 AM    HDL Cholesterol 39 (L) 12/03/2018 01:16 PM    HDL Cholesterol 44 07/17/2018 10:24 AM    HDL Cholesterol 36 (L) 01/23/2018 09:03 AM    LDL, calculated 64 07/16/2020 09:44 AM    LDL, calculated 61 03/19/2020 09:39 AM    LDL, calculated 67 12/03/2018 01:16 PM    LDL, calculated 70 07/17/2018 10:24 AM    LDL, calculated 75 01/23/2018 09:03 AM    Triglyceride 132 07/16/2020 09:44 AM    Triglyceride 96 03/19/2020 09:39 AM    Triglyceride 134 12/03/2018 01:16 PM    Triglyceride 134 07/17/2018 10:24 AM    Triglyceride 158 (H) 01/23/2018 09:03 AM     No results found for this or any previous visit. Assessment:         Patient Active Problem List    Diagnosis Date Noted    Type 2 diabetes with nephropathy ([facility] 07/16/2020    Type 2 diabetes mellitus with hyperglycemia, without long-term current use of insulin ([facility] 10/19/2017    Seizure ([facility]     GERD (gastroesophageal reflux disease) 07/15/2015    Orthostatic hypotension     Hypertension     Pure hypercholesterolemia         Plan:     Doing well with no adverse cardiac symptoms. Lipids and labs followed by PCP. Continue current care and f/u in 6 months. Dung Cochran MD  Pursuant to the emergency declaration under the [facility] waiver authority and the [employer] Act, this Virtual Visit was conducted, with patient's consent, to reduce the patient's risk of exposure to COVID-19 and provide continuity of care for an established patient. Services were provided through a telephone discussion virtually to substitute for in-person visit.     TOTAL TIME SPENT 22 mins

## 2020-08-25 RX ORDER — METOPROLOL TARTRATE 25 MG/1
12.5 TABLET, FILM COATED ORAL 2 TIMES DAILY
Qty: 90 TAB | Refills: 1 | Status: SHIPPED | OUTPATIENT
Start: 2020-08-25 | End: 2020-12-07 | Stop reason: ALTCHOICE

## 2020-12-06 ENCOUNTER — PATIENT MESSAGE (OUTPATIENT)
Dept: CARDIOLOGY CLINIC | Age: 85
End: 2020-12-06

## 2020-12-07 RX ORDER — PINDOLOL 5 MG/1
5 TABLET ORAL 2 TIMES DAILY
Qty: 180 TAB | Refills: 1 | Status: SHIPPED | OUTPATIENT
Start: 2020-12-07 | End: 2021-06-07

## 2021-03-19 ENCOUNTER — OFFICE VISIT (OUTPATIENT)
Dept: CARDIOLOGY CLINIC | Age: 86
End: 2021-03-19
Payer: MEDICARE

## 2021-03-19 VITALS
TEMPERATURE: 97.2 F | BODY MASS INDEX: 27.16 KG/M2 | HEIGHT: 66 IN | OXYGEN SATURATION: 93 % | RESPIRATION RATE: 16 BRPM | HEART RATE: 65 BPM | WEIGHT: 169 LBS | SYSTOLIC BLOOD PRESSURE: 118 MMHG | DIASTOLIC BLOOD PRESSURE: 70 MMHG

## 2021-03-19 DIAGNOSIS — I10 ESSENTIAL HYPERTENSION: Primary | ICD-10-CM

## 2021-03-19 DIAGNOSIS — I95.1 ORTHOSTATIC HYPOTENSION: ICD-10-CM

## 2021-03-19 DIAGNOSIS — E78.00 PURE HYPERCHOLESTEROLEMIA: ICD-10-CM

## 2021-03-19 PROCEDURE — G8419 CALC BMI OUT NRM PARAM NOF/U: HCPCS | Performed by: SPECIALIST

## 2021-03-19 PROCEDURE — 3288F FALL RISK ASSESSMENT DOCD: CPT | Performed by: SPECIALIST

## 2021-03-19 PROCEDURE — 99213 OFFICE O/P EST LOW 20 MIN: CPT | Performed by: SPECIALIST

## 2021-03-19 PROCEDURE — 1100F PTFALLS ASSESS-DOCD GE2>/YR: CPT | Performed by: SPECIALIST

## 2021-03-19 PROCEDURE — G8427 DOCREV CUR MEDS BY ELIG CLIN: HCPCS | Performed by: SPECIALIST

## 2021-03-19 PROCEDURE — G8536 NO DOC ELDER MAL SCRN: HCPCS | Performed by: SPECIALIST

## 2021-03-19 PROCEDURE — G8510 SCR DEP NEG, NO PLAN REQD: HCPCS | Performed by: SPECIALIST

## 2021-03-19 NOTE — PROGRESS NOTES
Chief Complaint   Patient presents with    Hypertension     6mth follow up     Cholesterol Problem     Verified patient with two patient identifiers. Medications reviewed/approved by Dr. Chelsea Ramírez. 1. Have you been to the ER, urgent care clinic since your last visit? Hospitalized since your last visit?no    2. Have you seen or consulted any other health care providers outside of the [address] [place] [address] since your last visit? Include any pap smears or colon screening.  no

## 2021-03-19 NOTE — PROGRESS NOTES
HISTORY OF PRESENT ILLNESS  Alexandria Morris is a 80 y.o. male     SUMMARY:   Problem List  Date Reviewed: 3/19/2021          Codes Class Noted    Type 2 diabetes with nephropathy [facility]) ICD-10-CM: E11.21  ICD-9-CM: 250.40, 583.81  7/16/2020        Type 2 diabetes mellitus with hyperglycemia, without long-term current use of insulin ([facility] ICD-10-CM: E11.65  ICD-9-CM: 250.00, 790.29  10/19/2017        Seizure ([facility] ICD-10-CM: R56.9  ICD-9-CM: 780.39  Unknown        GERD (gastroesophageal reflux disease) ICD-10-CM: K21.9  ICD-9-CM: 530.81  7/15/2015        Orthostatic hypotension ICD-10-CM: I95.1  ICD-9-CM: 458.0  Unknown        Hypertension ICD-10-CM: I10  ICD-9-CM: 401.9  Unknown        Pure hypercholesterolemia ICD-10-CM: E78.00  ICD-9-CM: 272.0  Unknown              Current Outpatient Medications on File Prior to Visit   Medication Sig    pravastatin (PRAVACHOL) 20 mg tablet TAKE 1 TABLET NIGHTLY    pindoloL (VISKIN) 5 mg tablet Take 1 Tab by mouth two (2) times a day. Replaces metoprolol    metFORMIN (GLUCOPHAGE) 500 mg tablet Take 1 Tab by mouth two (2) times daily (with meals). Indications: type 2 diabetes mellitus    pantoprazole (PROTONIX) 40 mg tablet TAKE 1 TABLET DAILY    MAGNESIUM CITRATE PO Take  by mouth.  fish oil-omega-3 fatty acids 300-500 mg cap Take  by mouth.  loratadine (CLARITIN) 10 mg tablet Take 10 mg by mouth daily.  Blood-Glucose Meter monitoring kit Test blood sugar daily. Dx. E11.65, not on insulin    glucose blood VI test strips (BLOOD GLUCOSE TEST) strip Test blood sugar daily. Dx E11.65 not on insulin    cinnamon bark-chromium picolin 500-100 mg-mcg cap Take 1 Cap by mouth daily. Indications: DIABETES MELLITUS    aspirin delayed-release 81 mg tablet Take 1 Tab by mouth daily.  ferrous sulfate (IRON) 325 mg (65 mg iron) tablet Take  by mouth Daily (before breakfast).  Take 1 every other day    betamethasone valerate (VALISONE) 0.1 % ointment Apply  to affected area two (2) times a day. (Patient taking differently: Apply  to affected area two (2) times daily as needed.)    acetaminophen (TYLENOL) 500 mg tablet Take  by mouth every six (6) hours as needed.  B.infantis-B.ani-B.long-B.bifi (PROBIOTIC 4X) 10-15 mg TbEC Take  by mouth daily. No current facility-administered medications on file prior to visit. CARDIOLOGY STUDIES TO DATE:  CARDIAC TESTING;    Carotid Dopplers 2/4/16--minimal (<15%) LUCIEN plaque, 0% Left    Holter- 2/2/16--NSR, isolated PAC's/PVC's    Echo 2/4/16--Normal LV size/walls, LVEF 65-70, D1, trace to mild MR, RVSP <35    Chief Complaint   Patient presents with    Hypertension     6mth follow up     Cholesterol Problem     HPI :  He is doing quite well. He has some very mild orthostatic type symptoms if he gets up too quick, but overall he is doing much better back on the pindolol rather than the metoprolol. He gets out and works in his yard every day. He has chickens and cats to feed any plants or garden in the summer. Primary care is following his lipids. CARDIAC ROS:   negative for chest pain, dyspnea, palpitations, syncope, orthopnea, paroxysmal nocturnal dyspnea, exertional chest pressure/discomfort, claudication, lower extremity edema    Family History   Problem Relation Age of Onset    COPD Father     COPD Sister     COPD Sister        Past Medical History:   Diagnosis Date    Diabetes mellitus, type 2 ([address]     DJD (degenerative joint disease)     GERD (gastroesophageal reflux disease)     Hypertension     Orthostatic hypotension     Parasomnia     PUD (peptic ulcer disease)     Pure hypercholesterolemia     Seizure ([address]        GENERAL ROS:  A comprehensive review of systems was negative except for that written in the HPI.     Visit Vitals  /70 (BP 1 Location: Left arm, BP Patient Position: Sitting, BP Cuff Size: Adult)   Pulse 65   Temp 97.2 °F (36.2 °C) (Temporal)   Resp 16   Ht 5' 6\" (1.676 m)   Wt 169 lb (76.7 kg)   SpO2 93%   BMI 27.28 kg/m²       Wt Readings from Last 3 Encounters:   03/19/21 169 lb (76.7 kg)   12/01/20 163 lb 12.8 oz (74.3 kg)   07/16/20 166 lb 3.2 oz (75.4 kg)            BP Readings from Last 3 Encounters:   03/19/21 118/70   12/01/20 110/60   07/16/20 100/60       PHYSICAL EXAM  General appearance: alert, cooperative, no distress, appears stated age  Neurologic: Alert and oriented X 3  Neck: supple, symmetrical, trachea midline, no adenopathy, no carotid bruit and no JVD  Lungs: clear to auscultation bilaterally  Heart: regular rate and rhythm, S1, S2 normal, no murmur, click, rub or gallop  Extremities: extremities normal, atraumatic, no cyanosis or edema    Lab Results   Component Value Date/Time    Cholesterol, total 140 12/01/2020 10:03 AM    Cholesterol, total 132 07/16/2020 09:44 AM    Cholesterol, total 127 03/19/2020 09:39 AM    Cholesterol, total 133 12/03/2018 01:16 PM    Cholesterol, total 141 07/17/2018 10:24 AM    HDL Cholesterol 47 12/01/2020 10:03 AM    HDL Cholesterol 42 07/16/2020 09:44 AM    HDL Cholesterol 47 03/19/2020 09:39 AM    HDL Cholesterol 39 (L) 12/03/2018 01:16 PM    HDL Cholesterol 44 07/17/2018 10:24 AM    LDL, calculated 75 12/01/2020 10:03 AM    LDL, calculated 64 07/16/2020 09:44 AM    LDL, calculated 61 03/19/2020 09:39 AM    LDL, calculated 67 12/03/2018 01:16 PM    LDL, calculated 70 07/17/2018 10:24 AM    LDL, calculated 75 01/23/2018 09:03 AM    Triglyceride 96 12/01/2020 10:03 AM    Triglyceride 132 07/16/2020 09:44 AM    Triglyceride 96 03/19/2020 09:39 AM    Triglyceride 134 12/03/2018 01:16 PM    Triglyceride 134 07/17/2018 10:24 AM     ASSESSMENT :      He is stable and minimally if at all symptomatic in terms of his orthostasis, on a good medical regimen and needs no cardiac testing at this time.   current treatment plan is effective, no change in therapy  lab results and schedule of future lab studies reviewed with patient  reviewed diet, exercise and weight control    Encounter Diagnoses   Name Primary?  Essential hypertension Yes    Orthostatic hypotension     Pure hypercholesterolemia      No orders of the defined types were placed in this encounter. Follow-up and Dispositions    · Return in about 6 months (around 9/19/2021). Mora Evangelista MD  3/19/2021  Please note that this dictation was completed with [employer], the computer voice recognition software. Quite often unanticipated grammatical, syntax, homophones, and other interpretive errors are inadvertently transcribed by the computer software. Please disregard these errors. Please excuse any errors that have escaped final proofreading. Thank you.

## 2021-04-13 ENCOUNTER — TELEPHONE (OUTPATIENT)
Dept: FAMILY MEDICINE CLINIC | Age: 86
End: 2021-04-13

## 2021-04-13 NOTE — TELEPHONE ENCOUNTER
Received documents from The [address] [place] [address] [facility] [employer]\". Patient needs appointment for foot exam. Attempted to call patients home and mobile but no answer, no VM. Unable to complete form until visit completed.

## 2021-07-15 ENCOUNTER — TELEPHONE (OUTPATIENT)
Dept: CARDIOLOGY CLINIC | Age: 86
End: 2021-07-15

## 2021-07-15 ENCOUNTER — OFFICE VISIT (OUTPATIENT)
Dept: FAMILY MEDICINE CLINIC | Age: 86
End: 2021-07-15
Payer: MEDICARE

## 2021-07-15 VITALS
RESPIRATION RATE: 18 BRPM | HEART RATE: 69 BPM | TEMPERATURE: 97.5 F | HEIGHT: 66 IN | BODY MASS INDEX: 26.9 KG/M2 | WEIGHT: 167.4 LBS | SYSTOLIC BLOOD PRESSURE: 136 MMHG | OXYGEN SATURATION: 99 % | DIASTOLIC BLOOD PRESSURE: 60 MMHG

## 2021-07-15 DIAGNOSIS — R07.9 CHEST PAIN, UNSPECIFIED TYPE: Primary | ICD-10-CM

## 2021-07-15 DIAGNOSIS — R55 SYNCOPE, UNSPECIFIED SYNCOPE TYPE: Primary | ICD-10-CM

## 2021-07-15 DIAGNOSIS — R00.2 PALPITATIONS: ICD-10-CM

## 2021-07-15 DIAGNOSIS — E11.65 TYPE 2 DIABETES MELLITUS WITH HYPERGLYCEMIA, WITHOUT LONG-TERM CURRENT USE OF INSULIN (HCC): ICD-10-CM

## 2021-07-15 DIAGNOSIS — Z00.00 MEDICARE ANNUAL WELLNESS VISIT, SUBSEQUENT: ICD-10-CM

## 2021-07-15 DIAGNOSIS — I10 ESSENTIAL HYPERTENSION: ICD-10-CM

## 2021-07-15 LAB
ALBUMIN SERPL-MCNC: 4.1 G/DL (ref 3.5–5)
ALBUMIN/GLOB SERPL: 1.4 {RATIO} (ref 1.1–2.2)
ALP SERPL-CCNC: 108 U/L (ref 45–117)
ALT SERPL-CCNC: 25 U/L (ref 12–78)
ANION GAP SERPL CALC-SCNC: 5 MMOL/L (ref 5–15)
AST SERPL-CCNC: 26 U/L (ref 15–37)
BASOPHILS # BLD: 0 K/UL (ref 0–0.1)
BASOPHILS NFR BLD: 0 % (ref 0–1)
BILIRUB SERPL-MCNC: 0.3 MG/DL (ref 0.2–1)
BUN SERPL-MCNC: 19 MG/DL (ref 6–20)
BUN/CREAT SERPL: 15 (ref 12–20)
CALCIUM SERPL-MCNC: 9.4 MG/DL (ref 8.5–10.1)
CHLORIDE SERPL-SCNC: 106 MMOL/L (ref 97–108)
CHOLEST SERPL-MCNC: 172 MG/DL
CO2 SERPL-SCNC: 30 MMOL/L (ref 21–32)
CREAT SERPL-MCNC: 1.29 MG/DL (ref 0.7–1.3)
DIFFERENTIAL METHOD BLD: NORMAL
EOSINOPHIL # BLD: 0.2 K/UL (ref 0–0.4)
EOSINOPHIL NFR BLD: 4 % (ref 0–7)
ERYTHROCYTE [DISTWIDTH] IN BLOOD BY AUTOMATED COUNT: 12.5 % (ref 11.5–14.5)
EST. AVERAGE GLUCOSE BLD GHB EST-MCNC: 128 MG/DL
GLOBULIN SER CALC-MCNC: 2.9 G/DL (ref 2–4)
GLUCOSE SERPL-MCNC: 115 MG/DL (ref 65–100)
HBA1C MFR BLD: 6.1 % (ref 4–5.6)
HCT VFR BLD AUTO: 44.8 % (ref 36.6–50.3)
HDLC SERPL-MCNC: 48 MG/DL
HDLC SERPL: 3.6 {RATIO} (ref 0–5)
HGB BLD-MCNC: 14.5 G/DL (ref 12.1–17)
IMM GRANULOCYTES # BLD AUTO: 0 K/UL (ref 0–0.04)
IMM GRANULOCYTES NFR BLD AUTO: 0 % (ref 0–0.5)
LDLC SERPL CALC-MCNC: 101.2 MG/DL (ref 0–100)
LYMPHOCYTES # BLD: 1.2 K/UL (ref 0.8–3.5)
LYMPHOCYTES NFR BLD: 25 % (ref 12–49)
MAGNESIUM SERPL-MCNC: 2.8 MG/DL (ref 1.6–2.4)
MCH RBC QN AUTO: 31.2 PG (ref 26–34)
MCHC RBC AUTO-ENTMCNC: 32.4 G/DL (ref 30–36.5)
MCV RBC AUTO: 96.3 FL (ref 80–99)
MONOCYTES # BLD: 0.5 K/UL (ref 0–1)
MONOCYTES NFR BLD: 10 % (ref 5–13)
NEUTS SEG # BLD: 3 K/UL (ref 1.8–8)
NEUTS SEG NFR BLD: 61 % (ref 32–75)
NRBC # BLD: 0 K/UL (ref 0–0.01)
NRBC BLD-RTO: 0 PER 100 WBC
PLATELET # BLD AUTO: 157 K/UL (ref 150–400)
PMV BLD AUTO: 12 FL (ref 8.9–12.9)
POTASSIUM SERPL-SCNC: 4.7 MMOL/L (ref 3.5–5.1)
PROT SERPL-MCNC: 7 G/DL (ref 6.4–8.2)
RBC # BLD AUTO: 4.65 M/UL (ref 4.1–5.7)
SODIUM SERPL-SCNC: 141 MMOL/L (ref 136–145)
TRIGL SERPL-MCNC: 114 MG/DL (ref ?–150)
TSH SERPL DL<=0.05 MIU/L-ACNC: 2.9 UIU/ML (ref 0.36–3.74)
VLDLC SERPL CALC-MCNC: 22.8 MG/DL
WBC # BLD AUTO: 4.9 K/UL (ref 4.1–11.1)

## 2021-07-15 PROCEDURE — 1100F PTFALLS ASSESS-DOCD GE2>/YR: CPT | Performed by: INTERNAL MEDICINE

## 2021-07-15 PROCEDURE — G0439 PPPS, SUBSEQ VISIT: HCPCS | Performed by: INTERNAL MEDICINE

## 2021-07-15 PROCEDURE — 99214 OFFICE O/P EST MOD 30 MIN: CPT | Performed by: INTERNAL MEDICINE

## 2021-07-15 PROCEDURE — 93010 ELECTROCARDIOGRAM REPORT: CPT | Performed by: INTERNAL MEDICINE

## 2021-07-15 PROCEDURE — G8427 DOCREV CUR MEDS BY ELIG CLIN: HCPCS | Performed by: INTERNAL MEDICINE

## 2021-07-15 PROCEDURE — G8536 NO DOC ELDER MAL SCRN: HCPCS | Performed by: INTERNAL MEDICINE

## 2021-07-15 PROCEDURE — G8419 CALC BMI OUT NRM PARAM NOF/U: HCPCS | Performed by: INTERNAL MEDICINE

## 2021-07-15 PROCEDURE — 3288F FALL RISK ASSESSMENT DOCD: CPT | Performed by: INTERNAL MEDICINE

## 2021-07-15 PROCEDURE — G8510 SCR DEP NEG, NO PLAN REQD: HCPCS | Performed by: INTERNAL MEDICINE

## 2021-07-15 NOTE — PATIENT INSTRUCTIONS
Medicare Wellness Visit, Male    The best way to live healthy is to have a lifestyle where you eat a well-balanced diet, exercise regularly, limit alcohol use, and quit all forms of tobacco/nicotine, if applicable. Regular preventive services are another way to keep healthy. Preventive services (vaccines, screening tests, monitoring & exams) can help personalize your care plan, which helps you manage your own care. Screening tests can find health problems at the earliest stages, when they are easiest to treat. [facility] follows the current, evidence-based guidelines published by the [facility] ([facility]) when recommending preventive services for our patients. Because we follow these guidelines, sometimes recommendations change over time as research supports it. (For example, a prostate screening blood test is no longer routinely recommended for men with no symptoms). Of course, you and your doctor may decide to screen more often for some diseases, based on your risk and co-morbidities (chronic disease you are already diagnosed with). Preventive services for you include:  - Medicare offers their members a free annual wellness visit, which is time for you and your primary care provider to discuss and plan for your preventive service needs. Take advantage of this benefit every year!  -All adults over age 72 should receive the recommended pneumonia vaccines. Current USPSTF guidelines recommend a series of two vaccines for the best pneumonia protection.   -All adults should have a flu vaccine yearly and tetanus vaccine every 10 years.  -All adults age 48 and older should receive the shingles vaccines (series of two vaccines).        -All adults age 38-68 who are overweight should have a diabetes screening test once every three years.   -Other screening tests & preventive services for persons with diabetes include: an eye exam to screen for diabetic retinopathy, a kidney function test, a foot exam, and stricter control over your cholesterol.   -Cardiovascular screening for adults with routine risk involves an electrocardiogram (ECG) at intervals determined by the provider.   -Colorectal cancer screening should be done for adults age 54-65 with no increased risk factors for colorectal cancer. There are a number of acceptable methods of screening for this type of cancer. Each test has its own benefits and drawbacks. Discuss with your provider what is most appropriate for you during your annual wellness visit. The different tests include: colonoscopy (considered the best screening method), a fecal occult blood test, a fecal DNA test, and sigmoidoscopy.  -All adults born between [facility] should be screened once for Hepatitis C.  -An Abdominal Aortic Aneurysm (AAA) Screening is recommended for men age 73-68 who has ever smoked in their lifetime.      Here is a list of your current Health Maintenance items (your personalized list of preventive services) with a due date:  Health Maintenance Due   Topic Date Due    Shingles Vaccine (1 of 2) Never done    Albumin Urine Test  01/23/2019    Eye Exam  08/08/2020    Diabetic Foot Care  07/16/2021

## 2021-07-15 NOTE — PROGRESS NOTES
Lizet Joseph is a 80 y.o. male presenting for/with:    Chief Complaint   Patient presents with    Annual Wellness Visit    Irregular Heart Beat     States HR \"feels rapid then misses a few beats then settles back down\" States occurs at any time. C/O dizziness during episode.  Constipation     treats with MOM OTC every few days - States takes OTC fiber daily       Visit Vitals  /60 (BP 1 Location: Left upper arm, BP Patient Position: Sitting, BP Cuff Size: Adult long)   Pulse 69   Temp 97.5 °F (36.4 °C) (Temporal)   Resp 18   Ht 5' 6\" (1.676 m)   Wt 167 lb 6.4 oz (75.9 kg)   SpO2 99%   BMI 27.02 kg/m²     Pain Scale: 2/10  Pain Location: Shoulder    1. Have you been to the ER, urgent care clinic since your last visit? Hospitalized since your last visit? NO    2. Have you seen or consulted any other health care providers outside of the [address] [place] [address] since your last visit? Include any pap smears or colon screening.  NO    Symptom review:  NO  Fever   NO  Shaking chills  NO  Cough  NO  Body aches  NO  Coughing up blood  NO  Chest congestion  NO  Chest pain  NO  Shortness of breath  NO  Profound Loss of smell/taste  NO  Nausea/Vomiting   NO  Loose stool/Diarrhea  NO  any skin issues    Patient Risk Factors Reviewed as follows:  NO  have you been in Close contact with confirmed COVID19 patient   NO  History of recent travel to affected geographical areas within the past 14 days  NO  COPD  NO  Active Cancer/Leukemia/Lymphoma/Chemotherapy  NO  Oral steroid use  NO  Pregnant  NO  Diabetes Mellitus  YES  Heart disease  NO  Asthma  NO Health care worker at home  [address] care worker  NO Is there a Pregnant Woman in the home  NO Dialysis pt in the home   NO a large number of people living in the home    Learning Assessment 7/15/2021   PRIMARY LEARNER Patient   CO-LEARNER CAREGIVER -   PRIMARY LANGUAGE ENGLISH   LEARNER PREFERENCE PRIMARY READING     -   ANSWERED BY patient   RELATIONSHIP SELF Fall Risk Assessment, last 12 mths 7/15/2021   Able to walk? Yes   Fall in past 12 months? 0   Do you feel unsteady? 1   Are you worried about falling 0   Is the gait abnormal? 0   Number of falls in past 12 months -   Fall with injury? -       3 most recent PHQ Screens 7/15/2021   Little interest or pleasure in doing things Several days   Feeling down, depressed, irritable, or hopeless Several days   Total Score PHQ 2 2     Abuse Screening Questionnaire 7/15/2021   Do you ever feel afraid of your partner? N   Are you in a relationship with someone who physically or mentally threatens you? N   Is it safe for you to go home? Y       ADL Assessment 7/15/2021   Feeding yourself No Help Needed   Getting from bed to chair No Help Needed   Getting dressed No Help Needed   Bathing or showering No Help Needed   Walk across the room (includes cane/walker) No Help Needed   Using the telphone No Help Needed   Taking your medications No Help Needed   Preparing meals No Help Needed   Managing money (expenses/bills) No Help Needed   Moderately strenuous housework (laundry) No Help Needed   Shopping for personal items (toiletries/medicines) No Help Needed   Shopping for groceries No Help Needed   Driving No Help Needed   Climbing a flight of stairs No Help Needed   Getting to places beyond walking distances No Help Needed        This is the Subsequent Medicare Annual Wellness Exam, performed 12 months or more after the Initial AWV or the last Subsequent AWV    I have reviewed the patient's medical history in detail and updated the computerized patient record. Assessment/Plan   Education and counseling provided:  Are appropriate based on today's review and evaluation    1.  Chest pain, unspecified type  -     AMB POC EKG ROUTINE W/ 12 LEADS, INTER & REP  2. Medicare annual wellness visit, subsequent       Depression Risk Factor Screening     3 most recent PHQ Screens 7/15/2021   Little interest or pleasure in doing things Several days   Feeling down, depressed, irritable, or hopeless Several days   Total Score PHQ 2 2       Alcohol Risk Screen    Do you average more than 1 drink per night or more than 7 drinks a week: No    In the past three months have you have had more than 4 drinks containing alcohol on one occasion: No        Functional Ability and Level of Safety    Hearing: Hearing is good. Activities of Daily Living: The home contains: no safety equipment. Patient does total self care      Ambulation: with no difficulty     Fall Risk:  Fall Risk Assessment, last 12 mths 7/15/2021   Able to walk? Yes   Fall in past 12 months? 0   Do you feel unsteady? 1   Are you worried about falling 0   Is the gait abnormal? 0   Number of falls in past 12 months -   Fall with injury?  -      Abuse Screen:  Patient is not abused       Cognitive Screening    Has your family/caregiver stated any concerns about your memory: no       Health Maintenance Due     Health Maintenance Due   Topic Date Due    Shingrix Vaccine Age 49> (1 of 2) Never done    MICROALBUMIN Q1  01/23/2019    Eye Exam Retinal or Dilated  08/08/2020    Foot Exam Q1  07/16/2021       Patient Care Team   Patient Care Team:  Cirilo Woodward MD as PCP - General (Internal Medicine)  Cirilo Woodward MD as PCP [facility]  Elza Baron MD (Orthopedic Surgery)  Shaina Norris MD (Cardiology)    History     Patient Active Problem List   Diagnosis Code    Hypertension I10    Pure hypercholesterolemia E78.00    Orthostatic hypotension I95.1    GERD (gastroesophageal reflux disease) K21.9    Seizure ([address] R56.9    Type 2 diabetes mellitus with hyperglycemia, without long-term current use of insulin ([address] E11.65    Type 2 diabetes with nephropathy ([address] E11.21     Past Medical History:   Diagnosis Date    Diabetes mellitus, type 2 ([address]     DJD (degenerative joint disease)     GERD (gastroesophageal reflux disease)     Hypertension     Orthostatic hypotension     Parasomnia     PUD (peptic ulcer disease)     Pure hypercholesterolemia     Seizure [facility])       Past Surgical History:   Procedure Laterality Date    ENDOSCOPY, COLON, DIAGNOSTIC  01/2012    HX CHOLECYSTECTOMY  02/2012    laparoscopic    HX GI  01/2012    endoscopy,BX    HX KNEE REPLACEMENT      Bilateral     Current Outpatient Medications   Medication Sig Dispense Refill    pindoloL (VISKIN) 5 mg tablet TAKE 1 TABLET TWICE A DAY (REPLACES METOPROLOL) 180 Tablet 1    metFORMIN (GLUCOPHAGE) 500 mg tablet TAKE 1 TABLET TWICE A DAY WITH MEALS FOR TYPE 2 DIABETES MELLITUS 180 Tablet 3    pravastatin (PRAVACHOL) 20 mg tablet TAKE 1 TABLET NIGHTLY 90 Tab 3    MAGNESIUM CITRATE PO Take  by mouth.  fish oil-omega-3 fatty acids 300-500 mg cap Take  by mouth.  loratadine (CLARITIN) 10 mg tablet Take 10 mg by mouth daily.  Blood-Glucose Meter monitoring kit Test blood sugar daily. Dx. E11.65, not on insulin 1 Kit 0    glucose blood VI test strips (BLOOD GLUCOSE TEST) strip Test blood sugar daily. Dx E11.65 not on insulin 100 Strip 3    cinnamon bark-chromium picolin 500-100 mg-mcg cap Take 1 Cap by mouth daily. Indications: DIABETES MELLITUS      aspirin delayed-release 81 mg tablet Take 1 Tab by mouth daily. 100 Tab 3    betamethasone valerate (VALISONE) 0.1 % ointment Apply  to affected area two (2) times a day. (Patient taking differently: Apply  to affected area two (2) times daily as needed.) 45 g 0    acetaminophen (TYLENOL) 500 mg tablet Take  by mouth every six (6) hours as needed.  B.infantis-B.ani-B.long-B.bifi (PROBIOTIC 4X) 10-15 mg TbEC Take  by mouth daily.  pantoprazole (PROTONIX) 40 mg tablet TAKE 1 TABLET DAILY (Patient not taking: Reported on 7/15/2021) 90 Tab 4    ferrous sulfate (IRON) 325 mg (65 mg iron) tablet Take  by mouth Daily (before breakfast).  Take 1 every other day (Patient not taking: Reported on 7/15/2021)       No Known Allergies    Family History   Problem Relation Age of Onset    COPD Father     COPD Sister     COPD Sister      Social History     Tobacco Use    Smoking status: Current Every Day Smoker     Types: Cigars    Smokeless tobacco: Never Used   Substance Use Topics    Alcohol use: No         Jocelynn George

## 2021-07-15 NOTE — TELEPHONE ENCOUNTER
Verbal order per Dr. Hamilton Vega. Order repeated and verified twice. S/W pts wife. Verified patient with two patient identifiers. Wife states that pt would rather come into the office for the application as opposed to mailing and receiving beginning of next week. Advised for office H/U, I can apply on 7/22. Advised that I will contact him if I can apply sooner. They both verbalized understanding.     ----- Message from Venus Myers MD sent at 7/15/2021 11:10 AM EDT -----  Regarding: RE: palpitations  Hope you are doing well. We can go ahead and order 48 hr Holter monitor now (has appt for September). Maria T can you set-up? Thanks [facility]  ----- Message -----  From: Marek Rivera MD  Sent: 7/15/2021  10:58 AM EDT  To: Venus Myers MD, Francyne Leventhal, LPN  Subject: palpitations                                     Hi Maria T and Cynkeitha [place]-  Just wanted to give you a heads up on Mr Jared Freeman. He is in clinic today complaining of palpitations and near syncope--not daily but a few times a month. I have ordered labs on him. He has an appt with you relatively soon but did not know if you wanted to have him come early for an event monitor or holter.   Chaya Hagan

## 2021-07-15 NOTE — PROGRESS NOTES
Mr. Alla Flannery is a 80 y.o. male who is here for evaluation of   Chief Complaint   Patient presents with    Annual Wellness Visit    Irregular Heart Beat     States HR \"feels rapid then misses a few beats then settles back down\" States occurs at any time. C/O dizziness during episode.  Constipation     treats with MOM OTC every few days - States takes OTC fiber daily   . ASSESSMENT AND PLAN:    1. Chest pain, unspecified type  EKG is normal.  He has follow up scheduled with Cardiology  - AMB POC EKG ROUTINE W/ 12 LEADS, INTER & REP  - LIPID PANEL; Future  - LIPID PANEL    2. Medicare annual wellness visit, subsequent  Completed below    3. Palpitations  May need 30 day event monitor, Holter, ECHO. Labs are pending. Will message his cardiologist.  - METABOLIC PANEL, COMPREHENSIVE; Future  - MAGNESIUM; Future  - MAGNESIUM  - METABOLIC PANEL, COMPREHENSIVE    4. Type 2 diabetes mellitus with hyperglycemia, without long-term current use of insulin (HCC)  - HEMOGLOBIN A1C WITH EAG; Future  - HEMOGLOBIN A1C WITH EAG    5. Essential hypertension  - LIPID PANEL; Future  - METABOLIC PANEL, COMPREHENSIVE; Future  - CBC WITH AUTOMATED DIFF; Future  - TSH 3RD GENERATION;  Future  - TSH 3RD GENERATION  - CBC WITH AUTOMATED DIFF  - METABOLIC PANEL, COMPREHENSIVE  - LIPID PANEL      Orders Placed This Encounter    LIPID PANEL     Standing Status:   Future     Number of Occurrences:   1     Standing Expiration Date:   8/17/2021    HEMOGLOBIN A1C WITH EAG     Standing Status:   Future     Number of Occurrences:   1     Standing Expiration Date:   1/00/7582    METABOLIC PANEL, COMPREHENSIVE     Standing Status:   Future     Number of Occurrences:   1     Standing Expiration Date:   8/17/2021    CBC WITH AUTOMATED DIFF     Standing Status:   Future     Number of Occurrences:   1     Standing Expiration Date:   8/17/2021    TSH 3RD GENERATION     Standing Status:   Future     Number of Occurrences:   1     Standing Expiration Date:   8/17/2021    MAGNESIUM     Standing Status:   Future     Number of Occurrences:   1     Standing Expiration Date:   8/17/2021    AMB POC EKG ROUTINE W/ 12 LEADS, INTER & REP     Order Specific Question:   Reason for Exam:     Answer:   chest pain           HPI  81 yo with recent onset palpitations and near fainting. Followed by Dr Travis Cordero (although last seen by Dr Lizz Leone in [place]). Otherwise well and is due for SAWV and labs     ROS:  Denies  fever, chills, cough, chest pain, SOB,  nausea, vomiting, or diarrhea. Denies wt loss, wt gain, hemoptysis, hematochezia or melena. Physical Examination:    Visit Vitals  /60 (BP 1 Location: Left upper arm, BP Patient Position: Sitting, BP Cuff Size: Adult long)   Pulse 69   Temp 97.5 °F (36.4 °C) (Temporal)   Resp 18   Ht 5' 6\" (1.676 m)   Wt 167 lb 6.4 oz (75.9 kg)   SpO2 99%   BMI 27.02 kg/m²      General:  Alert, cooperative, no distress. Head:  Normocephalic, without obvious abnormality, atraumatic. Eyes:  Conjunctivae/corneas clear. Pupils equal, round, reactive to light. Extraocular movements intact. Lungs:   Clear to auscultation bilaterally. Chest wall:  No tenderness or deformity. Cardiac:  RRR   Abdomen:   Soft, non-tender. Bowel sounds normal. No masses. No organomegaly. Extremities: Extremities normal, atraumatic, no cyanosis or edema. Pulses: 2+ and symmetric all extremities. Skin: Skin color, texture, turgor normal. No rashes or lesions. Lymph nodes: Cervical, supraclavicular, and axillary nodes normal.   Neurologic: CNII-XII intact. Normal strength, sensation, and reflexes throughout. EKG:  NSR, APC     On this date 07/15/2021 I have spent 30 minutes reviewing previous notes, test results and face to face with the patient discussing the diagnosis and importance of compliance with the treatment plan as well as documenting on the day of the visit.     Herber Munoz MD FACP    (signed electronically) on 7/15/2021 at 11:05 AM

## 2021-07-22 ENCOUNTER — CLINICAL SUPPORT (OUTPATIENT)
Dept: CARDIOLOGY CLINIC | Age: 86
End: 2021-07-22
Payer: MEDICARE

## 2021-07-22 DIAGNOSIS — R00.2 PALPITATIONS: ICD-10-CM

## 2021-07-22 DIAGNOSIS — R55 SYNCOPE, UNSPECIFIED SYNCOPE TYPE: ICD-10-CM

## 2021-07-22 NOTE — PROGRESS NOTES
OFFICE  hook up  HOLTER 48 hr monitor only. Verified patient with two patient identifiers. Patient verbalized understanding of its use. Ordering MD- Karen Ramos MD- Judson Morales  Reason: Syncope, unspecified syncope type [R55 (ICD-10-CM)]; Palpitations [R00.2 (ICD-10-CM)]  Start time: 2:03pm    Patient has been successfully enrolled through [employer]. No LOS.

## 2021-07-29 ENCOUNTER — OFFICE VISIT (OUTPATIENT)
Dept: FAMILY MEDICINE CLINIC | Age: 86
End: 2021-07-29
Payer: MEDICARE

## 2021-07-29 VITALS
HEIGHT: 66 IN | DIASTOLIC BLOOD PRESSURE: 58 MMHG | SYSTOLIC BLOOD PRESSURE: 108 MMHG | BODY MASS INDEX: 27 KG/M2 | WEIGHT: 168 LBS | HEART RATE: 75 BPM | RESPIRATION RATE: 16 BRPM | OXYGEN SATURATION: 98 % | TEMPERATURE: 97.8 F

## 2021-07-29 DIAGNOSIS — R00.2 PALPITATIONS: Primary | ICD-10-CM

## 2021-07-29 DIAGNOSIS — E11.65 TYPE 2 DIABETES MELLITUS WITH HYPERGLYCEMIA, WITHOUT LONG-TERM CURRENT USE OF INSULIN (HCC): ICD-10-CM

## 2021-07-29 DIAGNOSIS — R55 NEAR SYNCOPE: ICD-10-CM

## 2021-07-29 PROCEDURE — 99213 OFFICE O/P EST LOW 20 MIN: CPT | Performed by: INTERNAL MEDICINE

## 2021-07-29 PROCEDURE — G8432 DEP SCR NOT DOC, RNG: HCPCS | Performed by: INTERNAL MEDICINE

## 2021-07-29 PROCEDURE — G8427 DOCREV CUR MEDS BY ELIG CLIN: HCPCS | Performed by: INTERNAL MEDICINE

## 2021-07-29 PROCEDURE — 3288F FALL RISK ASSESSMENT DOCD: CPT | Performed by: INTERNAL MEDICINE

## 2021-07-29 PROCEDURE — G8536 NO DOC ELDER MAL SCRN: HCPCS | Performed by: INTERNAL MEDICINE

## 2021-07-29 PROCEDURE — G8419 CALC BMI OUT NRM PARAM NOF/U: HCPCS | Performed by: INTERNAL MEDICINE

## 2021-07-29 PROCEDURE — 1100F PTFALLS ASSESS-DOCD GE2>/YR: CPT | Performed by: INTERNAL MEDICINE

## 2021-07-29 NOTE — PROGRESS NOTES
Mr. Catrachito Galvan is a 80 y.o. male who is here for evaluation of   Chief Complaint   Patient presents with    Palpitations     Completed holter on Saturday afternoon. Still pending results.  Follow-up     2 week F/U    Constipation     Reports better routine.  Referral Request     request podiatry request for shoes implant- uses [facility] in [place]   . ASSESSMENT AND PLAN:    1. Palpitations  Dr Claudette Shell is following. 2. Near syncope  No recurrence    3. Type 2 diabetes mellitus with hyperglycemia, without long-term current use of insulin ([facility])  Foot exam today is negative for any lesions or wounds. -  DIABETES FOOT EXAM      Orders Placed This Encounter     DIABETES FOOT EXAM           HPI  81 yo with T2DM who seeks podiatry assistance for footware and inserts. Denies any wounds to the feet. Inspects them frequently. ROS:  Denies fever, chills, cough, chest pain, SOB,  nausea, vomiting, or diarrhea. Denies wt loss, wt gain, hemoptysis, hematochezia or melena. Physical Examination:    Visit Vitals  BP (!) 108/58 (BP 1 Location: Left upper arm, BP Patient Position: Sitting, BP Cuff Size: Adult long)   Pulse 75   Temp 97.8 °F (36.6 °C) (Temporal)   Resp 16   Ht 5' 6\" (1.676 m)   Wt 168 lb (76.2 kg)   SpO2 98%   BMI 27.12 kg/m²      General:  Alert, cooperative, no distress. Head:  Normocephalic, without obvious abnormality, atraumatic. Eyes:  Conjunctivae/corneas clear. Pupils equal, round, reactive to light. Extraocular movements intact. Lungs:   Clear to auscultation bilaterally. Chest wall:  No tenderness or deformity. Cardiac:  RRR   Abdomen:   Soft, non-tender. Bowel sounds normal. No masses. No organomegaly. Extremities: Extremities normal, atraumatic, no cyanosis or edema. Pulses: 2+ and symmetric all extremities. Skin: Skin color, texture, turgor normal. No rashes or lesions.    Lymph nodes: Cervical, supraclavicular, and axillary nodes normal. Neurologic: CNII-XII intact. Normal strength, sensation, and reflexes throughout. Diabetic Foot Exam:  Both feet are examined and demonstrate intact DP pulses. There is good capillary refill and sensation is intact. Skin is intact and there are no ulcers or sores. On this date 07/29/2021 I have spent 15 minutes reviewing previous notes, test results and face to face with the patient discussing the diagnosis and importance of compliance with the treatment plan as well as documenting on the day of the visit.     Ninoska Chambers MD FACP    (signed electronically) on 7/29/2021 at 12:05 PM

## 2021-07-29 NOTE — PROGRESS NOTES
Marycarmen Miller is a 80 y.o. male presenting for/with:    Chief Complaint   Patient presents with    Palpitations     Completed holter on Saturday afternoon. Still pending results.  Follow-up     2 week F/U    Constipation     Reports better routine.  Referral Request     request podiatry request for shoes implant- uses [facility] in [address]  BP (!) 108/58 (BP 1 Location: Left upper arm, BP Patient Position: Sitting, BP Cuff Size: Adult long)   Pulse 75   Temp 97.8 °F (36.6 °C) (Temporal)   Resp 16   Ht 5' 6\" (1.676 m)   Wt 168 lb (76.2 kg)   SpO2 98%   BMI 27.12 kg/m²     Pain Scale: 0 - No pain/10  Pain Location:     1. Have you been to the ER, urgent care clinic since your last visit? Hospitalized since your last visit? NO    2. Have you seen or consulted any other health care providers outside of the [address] [place] [address] since your last visit? Include any pap smears or colon screening.  NO    Symptom review:  NO  Fever   NO  Shaking chills  NO  Cough  NO  Body aches  NO  Coughing up blood  NO  Chest congestion  NO  Chest pain  NO  Shortness of breath  NO  Profound Loss of smell/taste  NO  Nausea/Vomiting   NO  Loose stool/Diarrhea  NO  any skin issues    Patient Risk Factors Reviewed as follows:  NO  have you been in Close contact with confirmed COVID19 patient   NO  History of recent travel to affected geographical areas within the past 14 days  NO  COPD  NO  Active Cancer/Leukemia/Lymphoma/Chemotherapy  NO  Oral steroid use  NO  Pregnant  NO  Diabetes Mellitus  YES  Heart disease  NO  Asthma  NO Health care worker at home  [address] care worker  NO Is there a Pregnant Woman in the home  NO Dialysis pt in the home   NO a large number of people living in the home    Learning Assessment 7/15/2021   PRIMARY LEARNER Patient   CO-LEARNER CAREGIVER -   PRIMARY LANGUAGE ENGLISH   LEARNER PREFERENCE PRIMARY READING     -   ANSWERED BY patient   RELATIONSHIP SELF     Fall Risk Assessment, last 12 mths 7/15/2021   Able to walk? Yes   Fall in past 12 months? 0   Do you feel unsteady? 1   Are you worried about falling 0   Is the gait abnormal? 0   Number of falls in past 12 months -   Fall with injury? -       3 most recent PHQ Screens 7/15/2021   Little interest or pleasure in doing things Several days   Feeling down, depressed, irritable, or hopeless Several days   Total Score PHQ 2 2     Abuse Screening Questionnaire 7/15/2021   Do you ever feel afraid of your partner? N   Are you in a relationship with someone who physically or mentally threatens you? N   Is it safe for you to go home?  Y       ADL Assessment 7/15/2021   Feeding yourself No Help Needed   Getting from bed to chair No Help Needed   Getting dressed No Help Needed   Bathing or showering No Help Needed   Walk across the room (includes cane/walker) No Help Needed   Using the telphone No Help Needed   Taking your medications No Help Needed   Preparing meals No Help Needed   Managing money (expenses/bills) No Help Needed   Moderately strenuous housework (laundry) No Help Needed   Shopping for personal items (toiletries/medicines) No Help Needed   Shopping for groceries No Help Needed   Driving No Help Needed   Climbing a flight of stairs No Help Needed   Getting to places beyond walking distances No Help Needed

## 2021-08-04 PROCEDURE — 93225 XTRNL ECG REC<48 HRS REC: CPT | Performed by: INTERNAL MEDICINE

## 2021-08-04 PROCEDURE — 93227 XTRNL ECG REC<48 HR R&I: CPT | Performed by: INTERNAL MEDICINE

## 2021-08-13 ENCOUNTER — HOSPITAL ENCOUNTER (EMERGENCY)
Age: 86
Discharge: HOME | End: 2021-08-13
Attending: EMERGENCY MEDICINE
Payer: MEDICARE

## 2021-08-13 ENCOUNTER — APPOINTMENT (OUTPATIENT)
Dept: CT IMAGING | Age: 86
End: 2021-08-13
Attending: EMERGENCY MEDICINE
Payer: MEDICARE

## 2021-08-13 VITALS
DIASTOLIC BLOOD PRESSURE: 56 MMHG | RESPIRATION RATE: 16 BRPM | HEART RATE: 86 BPM | TEMPERATURE: 98.9 F | SYSTOLIC BLOOD PRESSURE: 130 MMHG | OXYGEN SATURATION: 98 %

## 2021-08-13 DIAGNOSIS — N30.01 ACUTE CYSTITIS WITH HEMATURIA: Primary | ICD-10-CM

## 2021-08-13 LAB
ANION GAP SERPL CALC-SCNC: 7 MMOL/L (ref 5–15)
APPEARANCE UR: ABNORMAL
BACTERIA URNS QL MICRO: NEGATIVE /HPF
BASOPHILS # BLD: 0 K/UL (ref 0–0.1)
BASOPHILS NFR BLD: 0 % (ref 0–1)
BILIRUB UR QL: NEGATIVE
BUN SERPL-MCNC: 21 MG/DL (ref 6–20)
BUN/CREAT SERPL: 16 (ref 12–20)
CALCIUM SERPL-MCNC: 8.3 MG/DL (ref 8.5–10.1)
CHLORIDE SERPL-SCNC: 103 MMOL/L (ref 97–108)
CO2 SERPL-SCNC: 28 MMOL/L (ref 21–32)
COLOR UR: ABNORMAL
COMMENT, HOLDF: NORMAL
CREAT SERPL-MCNC: 1.29 MG/DL (ref 0.7–1.3)
DIFFERENTIAL METHOD BLD: ABNORMAL
EOSINOPHIL # BLD: 0.1 K/UL (ref 0–0.4)
EOSINOPHIL NFR BLD: 1 % (ref 0–7)
EPITH CASTS URNS QL MICRO: ABNORMAL /LPF
ERYTHROCYTE [DISTWIDTH] IN BLOOD BY AUTOMATED COUNT: 12.3 % (ref 11.5–14.5)
GLUCOSE SERPL-MCNC: 119 MG/DL (ref 65–100)
GLUCOSE UR STRIP.AUTO-MCNC: NEGATIVE MG/DL
HCT VFR BLD AUTO: 39.5 % (ref 36.6–50.3)
HGB BLD-MCNC: 13 G/DL (ref 12.1–17)
HGB UR QL STRIP: ABNORMAL
IMM GRANULOCYTES # BLD AUTO: 0.1 K/UL (ref 0–0.04)
IMM GRANULOCYTES NFR BLD AUTO: 1 % (ref 0–0.5)
KETONES UR QL STRIP.AUTO: NEGATIVE MG/DL
LEUKOCYTE ESTERASE UR QL STRIP.AUTO: ABNORMAL
LYMPHOCYTES # BLD: 1.3 K/UL (ref 0.8–3.5)
LYMPHOCYTES NFR BLD: 11 % (ref 12–49)
MCH RBC QN AUTO: 30.3 PG (ref 26–34)
MCHC RBC AUTO-ENTMCNC: 32.9 G/DL (ref 30–36.5)
MCV RBC AUTO: 92.1 FL (ref 80–99)
MONOCYTES # BLD: 1 K/UL (ref 0–1)
MONOCYTES NFR BLD: 9 % (ref 5–13)
NEUTS SEG # BLD: 9 K/UL (ref 1.8–8)
NEUTS SEG NFR BLD: 78 % (ref 32–75)
NITRITE UR QL STRIP.AUTO: NEGATIVE
NRBC # BLD: 0 K/UL (ref 0–0.01)
NRBC BLD-RTO: 0 PER 100 WBC
PH UR STRIP: 6.5 [PH] (ref 5–8)
PLATELET # BLD AUTO: 131 K/UL (ref 150–400)
PMV BLD AUTO: 9.8 FL (ref 8.9–12.9)
POTASSIUM SERPL-SCNC: 4.1 MMOL/L (ref 3.5–5.1)
PROT UR STRIP-MCNC: 100 MG/DL
RBC # BLD AUTO: 4.29 M/UL (ref 4.1–5.7)
RBC #/AREA URNS HPF: >100 /HPF (ref 0–5)
SAMPLES BEING HELD,HOLD: NORMAL
SODIUM SERPL-SCNC: 138 MMOL/L (ref 136–145)
SP GR UR REFRACTOMETRY: 1.02 (ref 1–1.03)
UROBILINOGEN UR QL STRIP.AUTO: 0.2 EU/DL (ref 0.2–1)
WBC # BLD AUTO: 11.5 K/UL (ref 4.1–11.1)
WBC URNS QL MICRO: ABNORMAL /HPF (ref 0–4)

## 2021-08-13 PROCEDURE — 99284 EMERGENCY DEPT VISIT MOD MDM: CPT

## 2021-08-13 PROCEDURE — 74011000258 HC RX REV CODE- 258: Performed by: EMERGENCY MEDICINE

## 2021-08-13 PROCEDURE — 74011250636 HC RX REV CODE- 250/636: Performed by: EMERGENCY MEDICINE

## 2021-08-13 PROCEDURE — 36415 COLL VENOUS BLD VENIPUNCTURE: CPT

## 2021-08-13 PROCEDURE — 96365 THER/PROPH/DIAG IV INF INIT: CPT

## 2021-08-13 PROCEDURE — 81001 URINALYSIS AUTO W/SCOPE: CPT

## 2021-08-13 PROCEDURE — 80048 BASIC METABOLIC PNL TOTAL CA: CPT

## 2021-08-13 PROCEDURE — 74176 CT ABD & PELVIS W/O CONTRAST: CPT

## 2021-08-13 PROCEDURE — 85025 COMPLETE CBC W/AUTO DIFF WBC: CPT

## 2021-08-13 PROCEDURE — 87086 URINE CULTURE/COLONY COUNT: CPT

## 2021-08-13 PROCEDURE — 51701 INSERT BLADDER CATHETER: CPT

## 2021-08-13 RX ORDER — SODIUM CHLORIDE 0.9 % (FLUSH) 0.9 %
5-10 SYRINGE (ML) INJECTION ONCE
Status: DISCONTINUED | OUTPATIENT
Start: 2021-08-13 | End: 2021-08-13 | Stop reason: HOSPADM

## 2021-08-13 RX ORDER — CEPHALEXIN 500 MG/1
500 CAPSULE ORAL 3 TIMES DAILY
Qty: 21 CAPSULE | Refills: 0 | Status: SHIPPED | OUTPATIENT
Start: 2021-08-13 | End: 2021-08-20

## 2021-08-13 RX ADMIN — CEFTRIAXONE SODIUM 1 G: 1 INJECTION, POWDER, FOR SOLUTION INTRAMUSCULAR; INTRAVENOUS at 14:07

## 2021-08-13 RX ADMIN — SODIUM CHLORIDE 1000 ML: 9 INJECTION, SOLUTION INTRAVENOUS at 14:06

## 2021-08-13 NOTE — ED PROVIDER NOTES
EMERGENCY DEPARTMENT HISTORY AND PHYSICAL EXAM          Date: 8/13/2021  Patient Name: Arturo Blackmon    History of Presenting Illness     Chief Complaint   Patient presents with    Bladder Infection       History Provided By: Patient    HPI: Arturo Blackmon is a 80 y.o. male, pmhx hypertension, high cholesterol, diabetes, seizures, kidney stones, BPH, who presents ambulatory to the ED c/o difficulty urinating    Patient notes since around 230 this morning he has been having difficulty with urinating. He states he has intense pressure to urinate and then it shows blood that comes out. He denies any abdominal pain, change from his chronic back pain as well as any nausea, vomiting, fevers or chills. He states he was okay yesterday. Patient does note he has a history of prostate cancer but his doctor told him its not changing and he did not need treatment. He notes when they decided to come to the emergency room all of a sudden he urinated on himself just as they were leaving the house. He states this is very unusual for him as he does not have trouble with his urine/bladder control    PCP: Bruce Vincent MD    Allergies: None known  Social Hx: +tobacco, -vaping, -EtOH, -Illicit Drugs; Lives with his wife    There are no other complaints, changes, or physical findings at this time. Current Facility-Administered Medications   Medication Dose Route Frequency Provider Last Rate Last Admin    sodium chloride (NS) flush 5-10 mL  5-10 mL IntraVENous ONCE Dennis Ríos MD        sodium chloride 0.9 % bolus infusion 1,000 mL  1,000 mL IntraVENous NOW Dennis Ríos MD        cefTRIAXone (ROCEPHIN) 1 g in 0.9% sodium chloride (MBP/ADV) 50 mL MBP  1 g IntraVENous NOW Dennis Ríos MD         Current Outpatient Medications   Medication Sig Dispense Refill    cephALEXin (Keflex) 500 mg capsule Take 1 Capsule by mouth three (3) times daily for 7 days.  21 Capsule 0    pindoloL (VISKIN) 5 mg tablet TAKE 1 TABLET TWICE A DAY (REPLACES METOPROLOL) 180 Tablet 1    metFORMIN (GLUCOPHAGE) 500 mg tablet TAKE 1 TABLET TWICE A DAY WITH MEALS FOR TYPE 2 DIABETES MELLITUS 180 Tablet 3    pravastatin (PRAVACHOL) 20 mg tablet TAKE 1 TABLET NIGHTLY 90 Tab 3    pantoprazole (PROTONIX) 40 mg tablet TAKE 1 TABLET DAILY (Patient not taking: Reported on 7/15/2021) 90 Tab 4    MAGNESIUM CITRATE PO Take  by mouth.  fish oil-omega-3 fatty acids 300-500 mg cap Take  by mouth.  loratadine (CLARITIN) 10 mg tablet Take 10 mg by mouth daily.  Blood-Glucose Meter monitoring kit Test blood sugar daily. Dx. E11.65, not on insulin 1 Kit 0    glucose blood VI test strips (BLOOD GLUCOSE TEST) strip Test blood sugar daily. Dx E11.65 not on insulin 100 Strip 3    cinnamon bark-chromium picolin 500-100 mg-mcg cap Take 1 Cap by mouth daily. Indications: DIABETES MELLITUS      aspirin delayed-release 81 mg tablet Take 1 Tab by mouth daily. 100 Tab 3    ferrous sulfate (IRON) 325 mg (65 mg iron) tablet Take  by mouth Daily (before breakfast). Take 1 every other day (Patient not taking: Reported on 7/15/2021)      betamethasone valerate (VALISONE) 0.1 % ointment Apply  to affected area two (2) times a day. (Patient taking differently: Apply  to affected area two (2) times daily as needed.) 45 g 0    acetaminophen (TYLENOL) 500 mg tablet Take  by mouth every six (6) hours as needed.  B.infantis-B.ani-B.long-B.bifi (PROBIOTIC 4X) 10-15 mg TbEC Take  by mouth daily.          Past History     Past Medical History:  Past Medical History:   Diagnosis Date    Diabetes mellitus, type 2 ([facility]     DJD (degenerative joint disease)     GERD (gastroesophageal reflux disease)     Hypertension     Orthostatic hypotension     Parasomnia     PUD (peptic ulcer disease)     Pure hypercholesterolemia     Seizure ([facility]        Past Surgical History:  Past Surgical History:   Procedure Laterality Date    ENDOSCOPY, COLON, DIAGNOSTIC  01/2012    HX CHOLECYSTECTOMY  02/2012    laparoscopic    HX GI  01/2012    endoscopy,BX    HX KNEE REPLACEMENT      Bilateral       Family History:  Family History   Problem Relation Age of Onset    COPD Father     COPD Sister     COPD Sister        Social History:  Social History     Tobacco Use    Smoking status: Current Every Day Smoker     Types: Cigars    Smokeless tobacco: Never Used   Substance Use Topics    Alcohol use: No    Drug use: No       Allergies:  No Known Allergies      Review of Systems   Review of Systems   Constitutional: Negative for activity change, appetite change, chills, fever and unexpected weight change. HENT: Negative for congestion. Eyes: Negative for pain and visual disturbance. Respiratory: Negative for cough and shortness of breath. Cardiovascular: Negative for chest pain. Gastrointestinal: Negative for abdominal pain, diarrhea, nausea and vomiting. Genitourinary: Positive for difficulty urinating, dysuria, frequency, hematuria and urgency. Negative for flank pain, penile swelling and testicular pain. Musculoskeletal: Negative for back pain. Skin: Negative for rash. Neurological: Negative for headaches. Physical Exam   Physical Exam  Vitals and nursing note reviewed. Constitutional:       Appearance: He is well-developed. He is not diaphoretic. Comments: This is a thin, elderly male, laying comfortably in bed with normal vital signs, in minimal acute distress   HENT:      Head: Normocephalic and atraumatic. Eyes:      General:         Right eye: No discharge. Left eye: No discharge. Conjunctiva/sclera: Conjunctivae normal.      Pupils: Pupils are equal, round, and reactive to light. Cardiovascular:      Rate and Rhythm: Normal rate and regular rhythm. Heart sounds: Normal heart sounds. No murmur heard. Pulmonary:      Effort: Pulmonary effort is normal. No respiratory distress.       Breath sounds: Normal breath sounds. No wheezing or rales. Abdominal:      General: Bowel sounds are normal. There is no distension. Palpations: Abdomen is soft. Tenderness: There is no abdominal tenderness. There is no guarding or rebound. Genitourinary:     Penis: Normal.       Testes: Normal.   Musculoskeletal:         General: Normal range of motion. Cervical back: Normal range of motion and neck supple. Right lower leg: No edema. Left lower leg: No edema. Skin:     General: Skin is warm and dry. Findings: No rash. Neurological:      Mental Status: He is alert and oriented to person, place, and time. Cranial Nerves: No cranial nerve deficit. Motor: No abnormal muscle tone. Diagnostic Study Results     Labs -     Recent Results (from the past 12 hour(s))   URINALYSIS W/ RFLX MICROSCOPIC    Collection Time: 08/13/21 11:49 AM   Result Value Ref Range    Color BROWN      Appearance CLOUDY (A) CLEAR      Specific gravity 1.020 1.003 - 1.030      pH (UA) 6.5 5.0 - 8.0      Protein 100 (A) NEG mg/dL    Glucose Negative NEG mg/dL    Ketone Negative NEG mg/dL    Bilirubin Negative NEG      Blood LARGE (A) NEG      Urobilinogen 0.2 0.2 - 1.0 EU/dL    Nitrites Negative NEG      Leukocyte Esterase SMALL (A) NEG     URINE MICROSCOPIC ONLY    Collection Time: 08/13/21 11:49 AM   Result Value Ref Range    WBC 20-50 0 - 4 /hpf    RBC >100 (H) 0 - 5 /hpf    Epithelial cells FEW FEW /lpf    Bacteria Negative NEG /hpf       Radiologic Studies -   CT ABD PELV WO CONT    (Results Pending)     CT Results  (Last 48 hours)    None        CXR Results  (Last 48 hours)    None            Medical Decision Making   I am the first provider for this patient. I reviewed the vital signs, available nursing notes, past medical history, past surgical history, family history and social history. Vital Signs-Reviewed the patient's vital signs.   Patient Vitals for the past 12 hrs:   Temp Pulse Resp BP SpO2 08/13/21 1211  72 18 (!) 123/55 98 %   08/13/21 1138 98.9 °F (37.2 °C) 77 14 125/62 100 %       Pulse Oximetry Analysis - 98% on RA    Records Reviewed: Nursing Notes and Old Medical Records    Provider Notes (Medical Decision Making):   MDM: Elderly male with significant prior history presenting with difficulty urinating. On arrival straight cath performed to obtain urine and patient only had approximately 50 mL in his bladder. It was extremely dark with clots noted. Labs without concern for sepsis. Await urinalysis results. ED Course:   Initial assessment performed. The patients presenting problems have been discussed, and they are in agreement with the care plan formulated and outlined with them. I have encouraged them to ask questions as they arise throughout their visit. PROGRESS NOTE:  1:48 PM   Pt sitting comfortably with his spouse at bedside. They have been updated regarding urinalysis results. Given his history for stones I recommend CT abdomen pelvis to rule out any evidence of ureteral colic. IV fluids with IV ceftriaxone to be initiated while awaiting results. Discharge note:  4:30 PM   pt re-evaluated and noted to be feeling better, ready for discharge. Updated pt  on all final lab and CT findings. Will follow up as instructed. All questions have been answered, pt voiced understanding and agreement with plan. Abx were prescribed, pt advised that diarrhea and rash are possible side effects of the medications. Specific return precautions provided as well as instructions to return to the ED should sx worsen at any time. Vital signs stable for discharge. Critical Care Time:   0      Diagnosis     Clinical Impression:   1. Acute cystitis with hematuria        PLAN:  1. Current Discharge Medication List      START taking these medications    Details   cephALEXin (Keflex) 500 mg capsule Take 1 Capsule by mouth three (3) times daily for 7 days.   Qty: 21 Capsule, Refills: 0  Start date: 8/13/2021, End date: 8/20/2021           2. Follow-up Information     Follow up With Specialties Details Why Contact Info    Surekha Myers MD Internal Medicine Schedule an appointment as soon as possible for a visit  for recheck Monday [phone] [facility] [address] [facility] Emergency Medicine  If symptoms worsen [phone]        Return to ED if worse     Disposition:  Home       Please note, this dictation was completed with [employer], the computer voice recognition software. Quite often unanticipated grammatical, syntax, homophones, and other interpretive errors are inadvertently transcribed by the computer software. Please disregard these errors. Please excuse any errors that have escaped final proof reading.

## 2021-08-13 NOTE — ED NOTES
Resting comfortably  Hourly rounding completed, assessed need for restroom and pain level. Pathway clear from obstructions and personal belongings within reach. Repositioned for comfort.

## 2021-08-14 LAB
BACTERIA SPEC CULT: ABNORMAL
CC UR VC: ABNORMAL
SERVICE CMNT-IMP: ABNORMAL

## 2021-08-15 NOTE — PROGRESS NOTES
Patient was discharged with Keflex. Does not appear sensitivities are pending his colony count less than 100,000.   Susannah Ji MD

## 2021-08-22 NOTE — PROGRESS NOTES
Mr. Paula Handley is a 80 y.o. male who is here for evaluation of   Chief Complaint   Patient presents with    Bladder Infection     Completed ABX and increased PO intake    Kidney Stone     Reports no further bloody urine since ABX therapy   . ASSESSMENT AND PLAN:    1. Nephrolithiasis  Seems to have passed the stone. If sx recur, RTC and consider cystoscopy  2. GRIFFITHS:  Check Hbg, BNP and CXR      Orders Placed This Encounter    XR CHEST PA LAT     Standing Status:   Future     Standing Expiration Date:   11/23/2021     Scheduling Instructions:      \[facility]\""     Order Specific Question:   Reason for Exam     Answer:   GRIFFITHS     Order Specific Question:   Which facility to perform procedure? Answer:   [address] W/ RFLX MICROSCOPIC     Standing Status:   Future     Standing Expiration Date:   8/26/2021    MICROALBUMIN, UR, RAND W/ MICROALB/CREAT RATIO     Standing Status:   Future     Standing Expiration Date:   2/11/4287    METABOLIC PANEL, BASIC     Standing Status:   Future     Standing Expiration Date:   8/26/2021    CBC WITH AUTOMATED DIFF     Standing Status:   Future     Standing Expiration Date:   8/26/2021    NT-PRO BNP     Standing Status:   Future     Standing Expiration Date:   8/26/2021           HPI  This office visit is the first followup in clinic since his ER eval on 8/13/21 with Dr Aubree Morrison. CT scan of the Abd and Pelvis was unrevealing. I&O cath yielded 50 cc of bloody urine with clots. Some GRIFFITHS. Has not had chest imaging for years. ROS:  Denies  fever, chills, cough, chest pain, nausea, vomiting, or diarrhea. Denies wt loss, wt gain, hemoptysis, hematochezia or melena.     Physical Examination:    Visit Vitals  BP (!) 115/50 (BP 1 Location: Left upper arm, BP Patient Position: Sitting, BP Cuff Size: Adult long)   Pulse 67   Temp 97.5 °F (36.4 °C) (Temporal)   Resp 19   Ht 5' 6\" (1.676 m)   Wt 168 lb 3.2 oz (76.3 kg)   SpO2 100%   BMI 27.15 kg/m²      General:  Alert, cooperative, no distress. Head:  Normocephalic, without obvious abnormality, atraumatic. Eyes:  Conjunctivae/corneas clear. Pupils equal, round, reactive to light. Extraocular movements intact. Lungs:   Clear to auscultation bilaterally. Chest wall:  No tenderness or deformity. Cardiac:  RRR   Abdomen:   Soft, non-tender. Bowel sounds normal. No masses. No organomegaly. Extremities: Extremities normal, atraumatic, no cyanosis or edema. Pulses: 2+ and symmetric all extremities. Skin: Skin color, texture, turgor normal. No rashes or lesions. Lymph nodes: Cervical, supraclavicular, and axillary nodes normal.   Neurologic: CNII-XII intact. Normal strength, sensation, and reflexes throughout. On this date 08/23/2021 I have spent 30 minutes reviewing previous notes, test results and face to face with the patient discussing the diagnosis and importance of compliance with the treatment plan as well as documenting on the day of the visit.     Taj Bullock MD FACP    (signed electronically) on 8/23/2021 at 2:06 PM

## 2021-08-23 ENCOUNTER — OFFICE VISIT (OUTPATIENT)
Dept: FAMILY MEDICINE CLINIC | Age: 86
End: 2021-08-23
Payer: MEDICARE

## 2021-08-23 ENCOUNTER — HOSPITAL ENCOUNTER (OUTPATIENT)
Dept: GENERAL RADIOLOGY | Age: 86
Discharge: HOME | End: 2021-08-23
Payer: MEDICARE

## 2021-08-23 VITALS
TEMPERATURE: 97.5 F | OXYGEN SATURATION: 100 % | BODY MASS INDEX: 27.03 KG/M2 | SYSTOLIC BLOOD PRESSURE: 115 MMHG | HEART RATE: 67 BPM | DIASTOLIC BLOOD PRESSURE: 50 MMHG | WEIGHT: 168.2 LBS | HEIGHT: 66 IN | RESPIRATION RATE: 19 BRPM

## 2021-08-23 DIAGNOSIS — R06.09 DOE (DYSPNEA ON EXERTION): ICD-10-CM

## 2021-08-23 DIAGNOSIS — D69.6 THROMBOCYTOPENIA, UNSPECIFIED (HCC): ICD-10-CM

## 2021-08-23 DIAGNOSIS — N20.0 NEPHROLITHIASIS: Primary | ICD-10-CM

## 2021-08-23 LAB
COMMENT, HOLDF: NORMAL
SAMPLES BEING HELD,HOLD: NORMAL

## 2021-08-23 PROCEDURE — 99214 OFFICE O/P EST MOD 30 MIN: CPT | Performed by: INTERNAL MEDICINE

## 2021-08-23 PROCEDURE — 71046 X-RAY EXAM CHEST 2 VIEWS: CPT

## 2021-08-23 NOTE — PROGRESS NOTES
Rossana Kevin is a 80 y.o. male presenting for/with:    Chief Complaint   Patient presents with    Bladder Infection     Completed ABX and increased PO intake    Kidney Stone     Reports no further bloody urine since ABX therapy       Visit Vitals  BP (!) 115/50 (BP 1 Location: Left upper arm, BP Patient Position: Sitting, BP Cuff Size: Adult long)   Pulse 67   Temp 97.5 °F (36.4 °C) (Temporal)   Resp 19   Ht 5' 6\" (1.676 m)   Wt 168 lb 3.2 oz (76.3 kg)   SpO2 100%   BMI 27.15 kg/m²     Pain Scale: 0 - No pain/10  Pain Location:     1. Have you been to the ER, urgent care clinic since your last visit? Hospitalized since your last visit? YES    2. Have you seen or consulted any other health care providers outside of the [address] [place] [address] since your last visit? Include any pap smears or colon screening. NO    Symptom review:  NO  Fever   NO  Shaking chills  NO  Cough  NO  Body aches  NO  Coughing up blood  NO  Chest congestion  NO  Chest pain  NO  Shortness of breath  NO  Profound Loss of smell/taste  NO  Nausea/Vomiting   NO  Loose stool/Diarrhea  NO  any skin issues    Patient Risk Factors Reviewed as follows:  NO  have you been in Close contact with confirmed COVID19 patient   NO  History of recent travel to affected geographical areas within the past 14 days  NO  COPD  NO  Active Cancer/Leukemia/Lymphoma/Chemotherapy  NO  Oral steroid use  NO  Pregnant  YES  Diabetes Mellitus  NO  Heart disease  NO  Asthma  NO Health care worker at home  [address] care worker  NO Is there a Pregnant Woman in the home  NO Dialysis pt in the home   NO a large number of people living in the home    Learning Assessment 7/15/2021   PRIMARY LEARNER Patient   CO-LEARNER CAREGIVER -   PRIMARY LANGUAGE ENGLISH   LEARNER PREFERENCE PRIMARY READING     -   ANSWERED BY patient   RELATIONSHIP SELF     Fall Risk Assessment, last 12 mths 7/15/2021   Able to walk? Yes   Fall in past 12 months? 0   Do you feel unsteady?  1   Are you worried about falling 0   Is the gait abnormal? 0   Number of falls in past 12 months -   Fall with injury? -       3 most recent PHQ Screens 8/23/2021   Little interest or pleasure in doing things Not at all   Feeling down, depressed, irritable, or hopeless Not at all   Total Score PHQ 2 0     Abuse Screening Questionnaire 7/15/2021   Do you ever feel afraid of your partner? N   Are you in a relationship with someone who physically or mentally threatens you? N   Is it safe for you to go home?  Y       ADL Assessment 7/15/2021   Feeding yourself No Help Needed   Getting from bed to chair No Help Needed   Getting dressed No Help Needed   Bathing or showering No Help Needed   Walk across the room (includes cane/walker) No Help Needed   Using the telphone No Help Needed   Taking your medications No Help Needed   Preparing meals No Help Needed   Managing money (expenses/bills) No Help Needed   Moderately strenuous housework (laundry) No Help Needed   Shopping for personal items (toiletries/medicines) No Help Needed   Shopping for groceries No Help Needed   Driving No Help Needed   Climbing a flight of stairs No Help Needed   Getting to places beyond walking distances No Help Needed      Advance directive on file and current

## 2021-08-24 LAB
ANION GAP SERPL CALC-SCNC: 2 MMOL/L (ref 5–15)
APPEARANCE UR: CLEAR
BACTERIA URNS QL MICRO: NEGATIVE /HPF
BASOPHILS # BLD: 0 K/UL (ref 0–0.1)
BASOPHILS NFR BLD: 0 % (ref 0–1)
BILIRUB UR QL: NEGATIVE
BNP SERPL-MCNC: 348 PG/ML
BUN SERPL-MCNC: 18 MG/DL (ref 6–20)
BUN/CREAT SERPL: 16 (ref 12–20)
CALCIUM SERPL-MCNC: 8.7 MG/DL (ref 8.5–10.1)
CHLORIDE SERPL-SCNC: 107 MMOL/L (ref 97–108)
CO2 SERPL-SCNC: 31 MMOL/L (ref 21–32)
COLOR UR: ABNORMAL
CREAT SERPL-MCNC: 1.12 MG/DL (ref 0.7–1.3)
CREAT UR-MCNC: 169 MG/DL
DIFFERENTIAL METHOD BLD: ABNORMAL
EOSINOPHIL # BLD: 0.1 K/UL (ref 0–0.4)
EOSINOPHIL NFR BLD: 3 % (ref 0–7)
EPITH CASTS URNS QL MICRO: ABNORMAL /LPF
ERYTHROCYTE [DISTWIDTH] IN BLOOD BY AUTOMATED COUNT: 12.1 % (ref 11.5–14.5)
GLUCOSE SERPL-MCNC: 99 MG/DL (ref 65–100)
GLUCOSE UR STRIP.AUTO-MCNC: NEGATIVE MG/DL
HCT VFR BLD AUTO: 42.4 % (ref 36.6–50.3)
HGB BLD-MCNC: 13.6 G/DL (ref 12.1–17)
HGB UR QL STRIP: NEGATIVE
HYALINE CASTS URNS QL MICRO: ABNORMAL /LPF (ref 0–5)
IMM GRANULOCYTES # BLD AUTO: 0 K/UL (ref 0–0.04)
IMM GRANULOCYTES NFR BLD AUTO: 1 % (ref 0–0.5)
KETONES UR QL STRIP.AUTO: ABNORMAL MG/DL
LEUKOCYTE ESTERASE UR QL STRIP.AUTO: ABNORMAL
LYMPHOCYTES # BLD: 1.2 K/UL (ref 0.8–3.5)
LYMPHOCYTES NFR BLD: 29 % (ref 12–49)
MCH RBC QN AUTO: 30.6 PG (ref 26–34)
MCHC RBC AUTO-ENTMCNC: 32.1 G/DL (ref 30–36.5)
MCV RBC AUTO: 95.5 FL (ref 80–99)
MICROALBUMIN UR-MCNC: 1.03 MG/DL
MICROALBUMIN/CREAT UR-RTO: 6 MG/G (ref 0–30)
MONOCYTES # BLD: 0.4 K/UL (ref 0–1)
MONOCYTES NFR BLD: 10 % (ref 5–13)
NEUTS SEG # BLD: 2.4 K/UL (ref 1.8–8)
NEUTS SEG NFR BLD: 57 % (ref 32–75)
NITRITE UR QL STRIP.AUTO: NEGATIVE
NRBC # BLD: 0 K/UL (ref 0–0.01)
NRBC BLD-RTO: 0 PER 100 WBC
PH UR STRIP: 6.5 [PH] (ref 5–8)
PLATELET # BLD AUTO: 268 K/UL (ref 150–400)
PMV BLD AUTO: 11.7 FL (ref 8.9–12.9)
POTASSIUM SERPL-SCNC: 4.5 MMOL/L (ref 3.5–5.1)
PROT UR STRIP-MCNC: NEGATIVE MG/DL
RBC # BLD AUTO: 4.44 M/UL (ref 4.1–5.7)
RBC #/AREA URNS HPF: ABNORMAL /HPF (ref 0–5)
SODIUM SERPL-SCNC: 140 MMOL/L (ref 136–145)
SP GR UR REFRACTOMETRY: 1.02 (ref 1–1.03)
UROBILINOGEN UR QL STRIP.AUTO: 0.2 EU/DL (ref 0.2–1)
WBC # BLD AUTO: 4.1 K/UL (ref 4.1–11.1)
WBC URNS QL MICRO: ABNORMAL /HPF (ref 0–4)

## 2021-09-19 PROBLEM — D69.6 THROMBOCYTOPENIA, UNSPECIFIED (HCC): Status: RESOLVED | Noted: 2021-08-23 | Resolved: 2021-09-19

## 2021-09-19 PROBLEM — E11.65 TYPE 2 DIABETES MELLITUS WITH HYPERGLYCEMIA, WITHOUT LONG-TERM CURRENT USE OF INSULIN (HCC): Status: RESOLVED | Noted: 2017-10-19 | Resolved: 2021-09-19

## 2021-09-21 ENCOUNTER — HOSPITAL ENCOUNTER (EMERGENCY)
Age: 86
Discharge: HOME | End: 2021-09-21
Attending: EMERGENCY MEDICINE
Payer: MEDICARE

## 2021-09-21 VITALS
WEIGHT: 160 LBS | HEART RATE: 58 BPM | SYSTOLIC BLOOD PRESSURE: 144 MMHG | OXYGEN SATURATION: 98 % | HEIGHT: 67 IN | BODY MASS INDEX: 25.11 KG/M2 | RESPIRATION RATE: 18 BRPM | TEMPERATURE: 97.8 F | DIASTOLIC BLOOD PRESSURE: 70 MMHG

## 2021-09-21 DIAGNOSIS — T26.62XA: Primary | ICD-10-CM

## 2021-09-21 PROCEDURE — 74011000250 HC RX REV CODE- 250: Performed by: EMERGENCY MEDICINE

## 2021-09-21 PROCEDURE — 99283 EMERGENCY DEPT VISIT LOW MDM: CPT

## 2021-09-21 RX ORDER — TETRACAINE HYDROCHLORIDE 5 MG/ML
1 SOLUTION OPHTHALMIC
Status: COMPLETED | OUTPATIENT
Start: 2021-09-21 | End: 2021-09-21

## 2021-09-21 RX ORDER — ERYTHROMYCIN 5 MG/G
OINTMENT OPHTHALMIC
Qty: 3.5 G | Refills: 0 | Status: SHIPPED | OUTPATIENT
Start: 2021-09-21 | End: 2021-09-28

## 2021-09-21 RX ADMIN — TETRACAINE HYDROCHLORIDE 1 DROP: 5 SOLUTION OPHTHALMIC at 10:26

## 2021-09-21 RX ADMIN — FLUORESCEIN SODIUM 1 STRIP: 1 STRIP OPHTHALMIC at 10:26
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
## 2021-09-21 NOTE — ED NOTES
I have reviewed discharge instructions with the patient and spouse. The patient and spouse verbalized understanding. Discharge medications discussed with patient. No questions at this time. Ambulated out without difficulty.

## 2021-09-21 NOTE — ED NOTES
Checked on patient, patient resting comfortably with call bell and bed rails up. Will continue to monitor.  Still has some tearing from the left eye using ice less red

## 2021-09-22 ENCOUNTER — OFFICE VISIT (OUTPATIENT)
Dept: CARDIOLOGY CLINIC | Age: 86
End: 2021-09-22
Payer: MEDICARE

## 2021-09-22 VITALS
OXYGEN SATURATION: 100 % | SYSTOLIC BLOOD PRESSURE: 110 MMHG | TEMPERATURE: 98.2 F | WEIGHT: 167 LBS | RESPIRATION RATE: 16 BRPM | HEART RATE: 80 BPM | DIASTOLIC BLOOD PRESSURE: 60 MMHG | BODY MASS INDEX: 26.84 KG/M2 | HEIGHT: 66 IN

## 2021-09-22 DIAGNOSIS — R56.9 SEIZURE (HCC): ICD-10-CM

## 2021-09-22 DIAGNOSIS — R55 SYNCOPE, UNSPECIFIED SYNCOPE TYPE: ICD-10-CM

## 2021-09-22 DIAGNOSIS — E78.00 PURE HYPERCHOLESTEROLEMIA: ICD-10-CM

## 2021-09-22 DIAGNOSIS — E11.21 TYPE 2 DIABETES WITH NEPHROPATHY (HCC): ICD-10-CM

## 2021-09-22 DIAGNOSIS — R00.2 PALPITATIONS: ICD-10-CM

## 2021-09-22 DIAGNOSIS — I95.1 ORTHOSTATIC HYPOTENSION: ICD-10-CM

## 2021-09-22 DIAGNOSIS — I10 ESSENTIAL HYPERTENSION: Primary | ICD-10-CM

## 2021-09-22 PROCEDURE — G8510 SCR DEP NEG, NO PLAN REQD: HCPCS | Performed by: INTERNAL MEDICINE

## 2021-09-22 PROCEDURE — G8419 CALC BMI OUT NRM PARAM NOF/U: HCPCS | Performed by: INTERNAL MEDICINE

## 2021-09-22 PROCEDURE — G8427 DOCREV CUR MEDS BY ELIG CLIN: HCPCS | Performed by: INTERNAL MEDICINE

## 2021-09-22 PROCEDURE — 3288F FALL RISK ASSESSMENT DOCD: CPT | Performed by: INTERNAL MEDICINE

## 2021-09-22 PROCEDURE — 99214 OFFICE O/P EST MOD 30 MIN: CPT | Performed by: INTERNAL MEDICINE

## 2021-09-22 PROCEDURE — G8536 NO DOC ELDER MAL SCRN: HCPCS | Performed by: INTERNAL MEDICINE

## 2021-09-22 PROCEDURE — 1100F PTFALLS ASSESS-DOCD GE2>/YR: CPT | Performed by: INTERNAL MEDICINE

## 2021-09-22 NOTE — PROGRESS NOTES
Identified pt with two pt identifiers(name and ). Reviewed record in preparation for visit and have obtained necessary documentation. Chief Complaint   Patient presents with    Hypertension     Follow up      Vitals:    21 1356   BP: 110/60   Pulse: 80   Resp: 16   Temp: 98.2 °F (36.8 °C)   TempSrc: Temporal   SpO2: 100%   Weight: 167 lb (75.8 kg)   Height: 5' 6\" (1.676 m)   PainSc:   0 - No pain       Medications reviewed/approved by Dr. Park. Health Maintenance Review: Patient reminded of \"due or due soon\" health maintenance. I have asked the patient to contact his/her primary care provider (PCP) for follow-up on his/her health maintenance. Coordination of Care Questionnaire:  :   1) Have you been to an emergency room, urgent care, or hospitalized since your last visit? If yes, where when, and reason for visit? no       2. Have seen or consulted any other health care provider since your last visit? If yes, where when, and reason for visit? Yes, [facility] ([facility])      Patient is accompanied by spouse I have received verbal consent from Merlin Vasquez to discuss any/all medical information while they are present in the room.
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
## 2021-11-13 PROBLEM — Z13.1 ENCOUNTER FOR SCREENING FOR DIABETES MELLITUS: Status: ACTIVE | Noted: 2021-11-13

## 2021-11-13 PROBLEM — I48.91 ATRIAL FIBRILLATION (HCC): Status: ACTIVE | Noted: 2021-11-13
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
## 2021-11-18 ENCOUNTER — OFFICE VISIT (OUTPATIENT)
Dept: FAMILY MEDICINE CLINIC | Age: 86
End: 2021-11-18
Payer: MEDICARE

## 2021-11-18 VITALS
HEART RATE: 78 BPM | SYSTOLIC BLOOD PRESSURE: 108 MMHG | BODY MASS INDEX: 27 KG/M2 | DIASTOLIC BLOOD PRESSURE: 54 MMHG | WEIGHT: 168 LBS | OXYGEN SATURATION: 98 % | TEMPERATURE: 97.8 F | HEIGHT: 66 IN | RESPIRATION RATE: 18 BRPM

## 2021-11-18 DIAGNOSIS — E11.21 TYPE 2 DIABETES WITH NEPHROPATHY (HCC): Primary | ICD-10-CM

## 2021-11-18 DIAGNOSIS — I10 PRIMARY HYPERTENSION: ICD-10-CM

## 2021-11-18 DIAGNOSIS — I48.0 PAROXYSMAL ATRIAL FIBRILLATION (HCC): ICD-10-CM

## 2021-11-18 DIAGNOSIS — G25.0 ESSENTIAL TREMOR: ICD-10-CM

## 2021-11-18 PROCEDURE — 99214 OFFICE O/P EST MOD 30 MIN: CPT | Performed by: INTERNAL MEDICINE

## 2021-11-18 NOTE — PROGRESS NOTES
Paulette Peters is a 80 y.o. male presenting for/with:    Chief Complaint   Patient presents with    Hypertension    Diabetes    Immunization/Injection     Vaccines updated       Visit Vitals  BP (!) 108/54 (BP 1 Location: Left upper arm, BP Patient Position: Sitting, BP Cuff Size: Adult long)   Pulse 78   Temp 97.8 °F (36.6 °C) (Temporal)   Resp 18   Ht 5' 6\" (1.676 m)   Wt 168 lb (76.2 kg)   SpO2 98%   BMI 27.12 kg/m²     Pain Scale: 0 - No pain/10  Pain Location:     1. Have you been to the ER, urgent care clinic since your last visit? Hospitalized since your last visit? NO    2. Have you seen or consulted any other health care providers outside of the [address] [place] [address] since your last visit? Include any pap smears or colon screening. NO    Symptom review:  NO  Fever   NO  Shaking chills  NO  Cough  NO  Body aches  NO  Coughing up blood  NO  Chest congestion  NO  Chest pain  NO  Shortness of breath  NO  Profound Loss of smell/taste  NO  Nausea/Vomiting   NO  Loose stool/Diarrhea  NO  any skin issues    Patient Risk Factors Reviewed as follows:  NO  have you been in Close contact with confirmed COVID19 patient   NO  History of recent travel to affected geographical areas within the past 14 days  NO  COPD  NO  Active Cancer/Leukemia/Lymphoma/Chemotherapy  NO  Oral steroid use  NO  Pregnant  YES  Diabetes Mellitus  YES  Heart disease  NO  Asthma  NO Health care worker at home  [address] care worker  NO Is there a Pregnant Woman in the home  NO Dialysis pt in the home   NO a large number of people living in the home    Learning Assessment 9/22/2021   PRIMARY LEARNER Patient   [address] CAREGIVER Yes   CO-LEARNER NAME spouse   PRIMARY LANGUAGE ENGLISH   LEARNER PREFERENCE PRIMARY DEMONSTRATION     -   ANSWERED BY pt   RELATIONSHIP SELF     Fall Risk Assessment, last 12 mths 11/18/2021   Able to walk? Yes   Fall in past 12 months? 0   Do you feel unsteady?  0   Are you worried about falling 0   Is the gait abnormal? -   Number of falls in past 12 months -   Fall with injury? -       3 most recent PHQ Screens 11/18/2021   Little interest or pleasure in doing things Not at all   Feeling down, depressed, irritable, or hopeless Not at all   Total Score PHQ 2 0     Abuse Screening Questionnaire 11/18/2021   Do you ever feel afraid of your partner? N   Are you in a relationship with someone who physically or mentally threatens you? N   Is it safe for you to go home?  Y       ADL Assessment 11/18/2021   Feeding yourself No Help Needed   Getting from bed to chair No Help Needed   Getting dressed No Help Needed   Bathing or showering No Help Needed   Walk across the room (includes cane/walker) No Help Needed   Using the telphone No Help Needed   Taking your medications No Help Needed   Preparing meals No Help Needed   Managing money (expenses/bills) No Help Needed   Moderately strenuous housework (laundry) No Help Needed   Shopping for personal items (toiletries/medicines) No Help Needed   Shopping for groceries No Help Needed   Driving No Help Needed   Climbing a flight of stairs No Help Needed   Getting to places beyond walking distances No Help Needed      Advance Care Planning 11/22/2019   Patient's Healthcare Decision Maker is: Named in scanned ACP document   Confirm Advance Directive Yes, on file

## 2021-11-19 LAB
EST. AVERAGE GLUCOSE BLD GHB EST-MCNC: 126 MG/DL
HBA1C MFR BLD: 6 % (ref 4–5.6)

## 2021-11-26 RX ORDER — PINDOLOL 5 MG/1
TABLET ORAL
Qty: 90 TABLET | Refills: 1 | Status: SHIPPED | COMMUNITY
Start: 2021-11-26 | End: 2022-01-28

## 2021-12-13 PROBLEM — Z13.1 ENCOUNTER FOR SCREENING FOR DIABETES MELLITUS: Status: RESOLVED | Noted: 2021-11-13 | Resolved: 2021-12-13

## 2022-01-24 RX ORDER — PRAVASTATIN SODIUM 20 MG/1
TABLET ORAL
Qty: 90 TABLET | Refills: 3 | Status: SHIPPED | OUTPATIENT
Start: 2022-01-24

## 2022-01-28 RX ORDER — PINDOLOL 5 MG/1
TABLET ORAL
Qty: 90 TABLET | Refills: 7 | Status: SHIPPED | OUTPATIENT
Start: 2022-01-28 | End: 2022-01-31 | Stop reason: SDUPTHER

## 2022-01-31 RX ORDER — PINDOLOL 5 MG/1
TABLET ORAL
Qty: 180 TABLET | Refills: 3 | Status: SHIPPED | OUTPATIENT
Start: 2022-01-31

## 2022-01-31 NOTE — TELEPHONE ENCOUNTER
PCP: Lor Bonilla MD    Last appt: Visit date not found  Future Appointments   Date Time Provider Levon Hernandezisti   2/18/2022 10:10 AM Gus Rose MD [facility] MAIN BS AMB   3/23/2022  2:20 PM Palmer Lora, NP [facility] BS AMB       Requested Prescriptions     Pending Prescriptions Disp Refills    pindoloL (VISKIN) 5 mg tablet 180 Tablet 3     Sig: TAKE 1 TABLET TWICE A DAY     Signed Prescriptions Disp Refills    pindoloL (VISKIN) 5 mg tablet 90 Tablet 7     Sig: TAKE 1 TABLET TWICE A DAY     Authorizing Provider: Mazin Maurice     Ordering User: Johnathon Pacheco         Other Comments: Patient requesting 90 days.

## 2022-02-14 NOTE — PROGRESS NOTES
Mr. Rowan Arriaga is a 80 y.o. male who is here for evaluation of   Chief Complaint   Patient presents with    Hypertension    Diabetes     routine 3 month F/U    Fall     Reports cutting a tree limb down on Wednesday. Got his foot caught in sraah and face planted. Denies LOC. Denies HA. Denies blurred vision. (+) eccymosis present.  Post Traumatic Stress Disorder     Reports history of nightmares. States that if he takes melatonin he does not have nightmares. Wants to know if it is safe for him to take nightly   . ASSESSMENT AND PLAN:    Recently fell while cutting a tree and sustained significant soft tissue trauma to the right side of the face. No evidence of intracranial injury. Neurologically nonfocal.  Follow-up closely. 1. Type 2 diabetes with nephropathy ([facility] [address]  Due for labs in April  2. Paroxysmal atrial fibrillation (HCC)  Status post significant fall and fortunately only anticoagulated with aspirin and fish oil. 3. Seizure ([address]  No recent seizures  4. Primary hypertension  Blood pressure is at goal.      No orders of the defined types were placed in this encounter. HPI   77-year-old gentleman with paroxysmal atrial fibrillation, type 2 diabetes with nephropathy, primary hypertension and essential tremor. Returns for interval assessment. Remains on Metformin and pravastatin. 2 days ago while cutting down a tree he fell injuring the right side of his face. Currently takes fish oil and aspirin. Denies loss of consciousness. Denies double vision, loss of vision, memory problems cognitive difficulties or gait problems. ROS:  Denies  fever, chills, cough, chest pain, SOB,  nausea, vomiting, or diarrhea. Denies wt loss, wt gain, hemoptysis, hematochezia or melena.     Physical Examination:    Visit Vitals  /68 (BP 1 Location: Left upper arm, BP Patient Position: Sitting, BP Cuff Size: Adult long)   Pulse 65   Temp 97.9 °F (36.6 °C) (Oral)   Resp 18   Ht 5' 6\" (1.676 m)   Wt 175 lb 3.2 oz (79.5 kg)   SpO2 98%   BMI 28.28 kg/m²      General:  Alert, cooperative, no distress. Head:  Normocephalic, without obvious abnormality,             Eyes:  Conjunctivae/corneas clear. Pupils equal, round, reactive to light. Extraocular movements intact. Lungs:   Clear to auscultation bilaterally. Chest wall:  No tenderness or deformity. Cardiac:  RRR   Abdomen:   Soft, non-tender. Bowel sounds normal. No masses. No organomegaly. Extremities: Extremities normal, atraumatic, no cyanosis or edema. Pulses: 2+ and symmetric all extremities. Skin: Skin color, texture, turgor normal. No rashes or lesions. Lymph nodes: Cervical, supraclavicular, and axillary nodes normal.   Neurologic: CNII-XII intact. Normal strength, sensation, and reflexes throughout. On this date 02/18/2022 I have spent 30 minutes reviewing previous notes, test results and face to face with the patient discussing the diagnosis and importance of compliance with the treatment plan as well as documenting on the day of the visit.     Donis Israel MD FACP    (signed electronically) on 2/18/2022 at 3:08 PM

## 2022-02-18 ENCOUNTER — TELEPHONE (OUTPATIENT)
Dept: FAMILY MEDICINE CLINIC | Age: 87
End: 2022-02-18

## 2022-02-18 ENCOUNTER — OFFICE VISIT (OUTPATIENT)
Dept: FAMILY MEDICINE CLINIC | Age: 87
End: 2022-02-18
Payer: MEDICARE

## 2022-02-18 VITALS
HEIGHT: 66 IN | WEIGHT: 175.2 LBS | HEART RATE: 65 BPM | RESPIRATION RATE: 18 BRPM | OXYGEN SATURATION: 98 % | TEMPERATURE: 97.9 F | SYSTOLIC BLOOD PRESSURE: 124 MMHG | BODY MASS INDEX: 28.16 KG/M2 | DIASTOLIC BLOOD PRESSURE: 68 MMHG

## 2022-02-18 DIAGNOSIS — E11.21 TYPE 2 DIABETES WITH NEPHROPATHY (HCC): Primary | ICD-10-CM

## 2022-02-18 DIAGNOSIS — I48.0 PAROXYSMAL ATRIAL FIBRILLATION (HCC): ICD-10-CM

## 2022-02-18 DIAGNOSIS — R56.9 SEIZURE (HCC): ICD-10-CM

## 2022-02-18 DIAGNOSIS — I10 PRIMARY HYPERTENSION: ICD-10-CM

## 2022-02-18 PROCEDURE — 99214 OFFICE O/P EST MOD 30 MIN: CPT | Performed by: INTERNAL MEDICINE
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
## 2022-03-18 PROBLEM — E11.21 TYPE 2 DIABETES WITH NEPHROPATHY (HCC): Status: ACTIVE | Noted: 2020-07-16

## 2022-03-19 PROBLEM — I48.91 ATRIAL FIBRILLATION (HCC): Status: ACTIVE | Noted: 2021-11-13
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
## 2022-03-23 ENCOUNTER — OFFICE VISIT (OUTPATIENT)
Dept: CARDIOLOGY CLINIC | Age: 87
End: 2022-03-23
Payer: MEDICARE

## 2022-03-23 VITALS
SYSTOLIC BLOOD PRESSURE: 102 MMHG | DIASTOLIC BLOOD PRESSURE: 60 MMHG | RESPIRATION RATE: 18 BRPM | HEIGHT: 66 IN | WEIGHT: 175 LBS | OXYGEN SATURATION: 100 % | HEART RATE: 67 BPM | TEMPERATURE: 97.8 F | BODY MASS INDEX: 28.12 KG/M2

## 2022-03-23 DIAGNOSIS — E78.2 MIXED HYPERLIPIDEMIA: ICD-10-CM

## 2022-03-23 DIAGNOSIS — I10 ESSENTIAL HYPERTENSION: Primary | ICD-10-CM

## 2022-03-23 DIAGNOSIS — I48.0 PAROXYSMAL ATRIAL FIBRILLATION (HCC): ICD-10-CM

## 2022-03-23 DIAGNOSIS — E11.21 TYPE 2 DIABETES WITH NEPHROPATHY (HCC): ICD-10-CM

## 2022-03-23 DIAGNOSIS — I95.1 ORTHOSTATIC HYPOTENSION: ICD-10-CM

## 2022-03-23 PROCEDURE — G8432 DEP SCR NOT DOC, RNG: HCPCS | Performed by: NURSE PRACTITIONER

## 2022-03-23 PROCEDURE — 1101F PT FALLS ASSESS-DOCD LE1/YR: CPT | Performed by: NURSE PRACTITIONER

## 2022-03-23 PROCEDURE — 93000 ELECTROCARDIOGRAM COMPLETE: CPT | Performed by: NURSE PRACTITIONER

## 2022-03-23 PROCEDURE — 99214 OFFICE O/P EST MOD 30 MIN: CPT | Performed by: NURSE PRACTITIONER

## 2022-03-23 PROCEDURE — G8419 CALC BMI OUT NRM PARAM NOF/U: HCPCS | Performed by: NURSE PRACTITIONER

## 2022-03-23 PROCEDURE — G8536 NO DOC ELDER MAL SCRN: HCPCS | Performed by: NURSE PRACTITIONER

## 2022-03-23 PROCEDURE — G8427 DOCREV CUR MEDS BY ELIG CLIN: HCPCS | Performed by: NURSE PRACTITIONER

## 2022-03-23 NOTE — PROGRESS NOTES
Identified pt with two pt identifiers(name and ). Reviewed record in preparation for visit and have obtained necessary documentation. Chief Complaint   Patient presents with    Irregular Heart Beat     6 month follow up    Shortness of Breath     \"while im up working but If I sit down it resolves. More at night. \"    Hypertension      Vitals:    22 1426   BP: 102/60   Pulse: 67   Resp: 18   Temp: 97.8 °F (36.6 °C)   TempSrc: Temporal   SpO2: 100%   Weight: 175 lb (79.4 kg)   Height: 5' 6\" (1.676 m)   PainSc:   0 - No pain       Medications reviewed/approved by provider. Health Maintenance Review: Patient reminded of \"due or due soon\" health maintenance. I have asked the patient to contact his/her primary care provider (PCP) for follow-up on his/her health maintenance. Coordination of Care Questionnaire:  :   1) Have you been to an emergency room, urgent care, or hospitalized since your last visit? If yes, where when, and reason for visit? no       2. Have seen or consulted any other health care provider since your last visit? If yes, where when, and reason for visit? NO      Patient is accompanied by self I have received verbal consent from Tami Malhotra to discuss any/all medical information while they are present in the room.

## 2022-03-25 ENCOUNTER — HOSPITAL ENCOUNTER (OUTPATIENT)
Dept: ULTRASOUND IMAGING | Age: 87
Discharge: HOME | End: 2022-03-25
Attending: NURSE PRACTITIONER
Payer: MEDICARE

## 2022-03-25 DIAGNOSIS — I95.1 ORTHOSTATIC HYPOTENSION: ICD-10-CM

## 2022-03-25 DIAGNOSIS — I10 ESSENTIAL HYPERTENSION: ICD-10-CM

## 2022-03-25 DIAGNOSIS — E11.21 TYPE 2 DIABETES WITH NEPHROPATHY (HCC): ICD-10-CM

## 2022-03-25 DIAGNOSIS — E78.2 MIXED HYPERLIPIDEMIA: ICD-10-CM

## 2022-03-25 DIAGNOSIS — I48.0 PAROXYSMAL ATRIAL FIBRILLATION (HCC): ICD-10-CM

## 2022-03-25 PROCEDURE — 93306 TTE W/DOPPLER COMPLETE: CPT

## 2022-03-27 LAB
ECHO AO ROOT DIAM: 3.4 CM
ECHO AV PEAK GRADIENT: 14 MMHG
ECHO AV PEAK VELOCITY: 1.9 M/S
ECHO EST RA PRESSURE: 10 MMHG
ECHO LA DIAMETER: 4.2 CM
ECHO LA TO AORTIC ROOT RATIO: 1.24
ECHO LA VOL 2C: 51 ML (ref 18–58)
ECHO LA VOL 4C: 52 ML (ref 18–58)
ECHO LA VOLUME AREA LENGTH: 55 ML
ECHO LV E' SEPTAL VELOCITY: 10 CM/S
ECHO LV FRACTIONAL SHORTENING: 42 % (ref 28–44)
ECHO LV INTERNAL DIMENSION DIASTOLIC: 4.5 CM (ref 4.2–5.9)
ECHO LV INTERNAL DIMENSION SYSTOLIC: 2.6 CM
ECHO LV IVSD: 0.8 CM (ref 0.6–1)
ECHO LV MASS 2D: 104.5 G (ref 88–224)
ECHO LV POSTERIOR WALL DIASTOLIC: 0.7 CM (ref 0.6–1)
ECHO LV RELATIVE WALL THICKNESS RATIO: 0.31
ECHO LVOT AREA: 3.1 CM2
ECHO LVOT DIAM: 2 CM
ECHO MV A VELOCITY: 0.8 M/S
ECHO MV E DECELERATION TIME (DT): 242.1 MS
ECHO MV E VELOCITY: 0.73 M/S
ECHO MV E/A RATIO: 0.91
ECHO MV E/E' SEPTAL: 7.3
ECHO RIGHT VENTRICULAR SYSTOLIC PRESSURE (RVSP): 32 MMHG
ECHO TV REGURGITANT MAX VELOCITY: 2.33 M/S
ECHO TV REGURGITANT PEAK GRADIENT: 22 MMHG

## 2022-03-27 PROCEDURE — 93306 TTE W/DOPPLER COMPLETE: CPT | Performed by: INTERNAL MEDICINE

## 2022-03-27 NOTE — PROGRESS NOTES
Echo looks great! Heart is pumping nice and strong. Valves look good and healthy.   See him in 6 mos

## 2022-03-31 ENCOUNTER — TELEPHONE (OUTPATIENT)
Dept: CARDIOLOGY CLINIC | Age: 87
End: 2022-03-31

## 2022-03-31 NOTE — TELEPHONE ENCOUNTER
----- Message from Alek Mack NP sent at 3/27/2022  6:03 PM EDT -----  Echo looks great! Heart is pumping nice and strong. Valves look good and healthy.   See him in 6 mos
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
## 2022-05-20 ENCOUNTER — OFFICE VISIT (OUTPATIENT)
Dept: FAMILY MEDICINE CLINIC | Age: 87
End: 2022-05-20
Payer: MEDICARE

## 2022-05-20 VITALS
RESPIRATION RATE: 17 BRPM | SYSTOLIC BLOOD PRESSURE: 116 MMHG | OXYGEN SATURATION: 99 % | DIASTOLIC BLOOD PRESSURE: 62 MMHG | TEMPERATURE: 97.2 F | HEIGHT: 66 IN | BODY MASS INDEX: 28.54 KG/M2 | HEART RATE: 66 BPM | WEIGHT: 177.6 LBS

## 2022-05-20 DIAGNOSIS — R56.9 SEIZURE (HCC): ICD-10-CM

## 2022-05-20 DIAGNOSIS — E11.21 TYPE 2 DIABETES WITH NEPHROPATHY (HCC): Primary | ICD-10-CM

## 2022-05-20 DIAGNOSIS — I10 PRIMARY HYPERTENSION: ICD-10-CM

## 2022-05-20 DIAGNOSIS — I48.0 PAROXYSMAL ATRIAL FIBRILLATION (HCC): ICD-10-CM

## 2022-05-20 PROCEDURE — 99214 OFFICE O/P EST MOD 30 MIN: CPT | Performed by: INTERNAL MEDICINE

## 2022-05-20 RX ORDER — METFORMIN HYDROCHLORIDE 500 MG/1
500 TABLET ORAL 2 TIMES DAILY WITH MEALS
Qty: 180 TABLET | Refills: 3 | Status: SHIPPED | OUTPATIENT
Start: 2022-05-20

## 2022-05-20 NOTE — PROGRESS NOTES
Domenica Brunson is a 80 y.o. male presenting for/with:    Chief Complaint   Patient presents with    Hypertension     3 month F/U    Diabetes     requesting [facility] of Metformin to [facility]    Grief Counseling     recnetly lost 80year old brother in [place] to [address]  /62 (BP 1 Location: Left upper arm, BP Patient Position: Sitting, BP Cuff Size: Adult long)   Pulse 66   Temp 97.2 °F (36.2 °C) (Temporal)   Resp 17   Ht 5' 6\" (1.676 m)   Wt 177 lb 9.6 oz (80.6 kg)   SpO2 99%   BMI 28.67 kg/m²     Pain Scale: 0 - No pain/10  Pain Location:     1. \"Have you been to the ER, urgent care clinic since your last visit? Hospitalized since your last visit? \" No    2. \"Have you seen or consulted any other health care providers outside of the [address] [place] [address] since your last visit? \" No     3. For patients aged 39-70: Has the patient had a colonoscopy / FIT/ Cologuard? NA - based on age      If the patient is female:    4. For patients aged 41-77: Has the patient had a mammogram within the past 2 years? NA - based on age or sex      11. For patients aged 21-65: Has the patient had a pap smear?  No      Symptom review:  NO  Fever   NO  Shaking chills  NO  Cough  NO  Body aches  NO  Coughing up blood  NO  Chest congestion  NO  Chest pain  NO  Shortness of breath  NO  Profound Loss of smell/taste  NO  Nausea/Vomiting   NO  Loose stool/Diarrhea  NO  any skin issues    Patient Risk Factors Reviewed as follows:  NO  have you been in Close contact with confirmed COVID19 patient   NO  History of recent travel to affected geographical areas within the past 14 days  NO  COPD  NO  Active Cancer/Leukemia/Lymphoma/Chemotherapy  NO  Oral steroid use  NO  Pregnant  YES  Diabetes Mellitus  YES  Heart disease  NO  Asthma  NO Health care worker at home  [address] care worker  NO Is there a Pregnant Woman in the home  NO Dialysis pt in the home   NO a large number of people living in the home    Learning Assessment 3/23/2022   PRIMARY LEARNER Patient   CO-LEARNER CAREGIVER No   CO-LEARNER NAME -   PRIMARY LANGUAGE ENGLISH   LEARNER PREFERENCE PRIMARY DEMONSTRATION     -   ANSWERED BY pt   RELATIONSHIP SELF     Fall Risk Assessment, last 12 mths 5/20/2022   Able to walk? Yes   Fall in past 12 months? 0   Do you feel unsteady? 0   Are you worried about falling 0   Is the gait abnormal? -   Number of falls in past 12 months -   Fall with injury? -       3 most recent PHQ Screens 5/20/2022   Little interest or pleasure in doing things Not at all   Feeling down, depressed, irritable, or hopeless Not at all   Total Score PHQ 2 0     Abuse Screening Questionnaire 5/20/2022   Do you ever feel afraid of your partner? N   Are you in a relationship with someone who physically or mentally threatens you? N   Is it safe for you to go home?  Y       ADL Assessment 5/20/2022   Feeding yourself No Help Needed   Getting from bed to chair No Help Needed   Getting dressed No Help Needed   Bathing or showering No Help Needed   Walk across the room (includes cane/walker) No Help Needed   Using the telphone No Help Needed   Taking your medications No Help Needed   Preparing meals No Help Needed   Managing money (expenses/bills) No Help Needed   Moderately strenuous housework (laundry) No Help Needed   Shopping for personal items (toiletries/medicines) No Help Needed   Shopping for groceries No Help Needed   Driving No Help Needed   Climbing a flight of stairs No Help Needed   Getting to places beyond walking distances No Help Needed      Advance Care Planning 11/22/2019   Patient's Healthcare Decision Maker is: Named in scanned ACP document   Confirm Advance Directive Yes, on file

## 2022-05-21 LAB
ALBUMIN SERPL-MCNC: 3.6 G/DL (ref 3.5–5)
ALBUMIN/GLOB SERPL: 1.2 {RATIO} (ref 1.1–2.2)
ALP SERPL-CCNC: 130 U/L (ref 45–117)
ALT SERPL-CCNC: 21 U/L (ref 12–78)
ANION GAP SERPL CALC-SCNC: 4 MMOL/L (ref 5–15)
AST SERPL-CCNC: 23 U/L (ref 15–37)
BASOPHILS # BLD: 0 K/UL (ref 0–0.1)
BASOPHILS NFR BLD: 1 % (ref 0–1)
BILIRUB SERPL-MCNC: 0.3 MG/DL (ref 0.2–1)
BUN SERPL-MCNC: 19 MG/DL (ref 6–20)
BUN/CREAT SERPL: 15 (ref 12–20)
CALCIUM SERPL-MCNC: 8.9 MG/DL (ref 8.5–10.1)
CHLORIDE SERPL-SCNC: 109 MMOL/L (ref 97–108)
CHOLEST SERPL-MCNC: 133 MG/DL
CO2 SERPL-SCNC: 28 MMOL/L (ref 21–32)
CREAT SERPL-MCNC: 1.27 MG/DL (ref 0.7–1.3)
DIFFERENTIAL METHOD BLD: ABNORMAL
EOSINOPHIL # BLD: 0.2 K/UL (ref 0–0.4)
EOSINOPHIL NFR BLD: 4 % (ref 0–7)
ERYTHROCYTE [DISTWIDTH] IN BLOOD BY AUTOMATED COUNT: 12.6 % (ref 11.5–14.5)
EST. AVERAGE GLUCOSE BLD GHB EST-MCNC: 140 MG/DL
GLOBULIN SER CALC-MCNC: 2.9 G/DL (ref 2–4)
GLUCOSE SERPL-MCNC: 151 MG/DL (ref 65–100)
HBA1C MFR BLD: 6.5 % (ref 4–5.6)
HCT VFR BLD AUTO: 42.8 % (ref 36.6–50.3)
HDLC SERPL-MCNC: 47 MG/DL
HDLC SERPL: 2.8 {RATIO} (ref 0–5)
HGB BLD-MCNC: 13.6 G/DL (ref 12.1–17)
IMM GRANULOCYTES # BLD AUTO: 0 K/UL (ref 0–0.04)
IMM GRANULOCYTES NFR BLD AUTO: 0 % (ref 0–0.5)
LDLC SERPL CALC-MCNC: 63.6 MG/DL (ref 0–100)
LYMPHOCYTES # BLD: 1.2 K/UL (ref 0.8–3.5)
LYMPHOCYTES NFR BLD: 29 % (ref 12–49)
MCH RBC QN AUTO: 30.8 PG (ref 26–34)
MCHC RBC AUTO-ENTMCNC: 31.8 G/DL (ref 30–36.5)
MCV RBC AUTO: 97.1 FL (ref 80–99)
MONOCYTES # BLD: 0.4 K/UL (ref 0–1)
MONOCYTES NFR BLD: 10 % (ref 5–13)
NEUTS SEG # BLD: 2.4 K/UL (ref 1.8–8)
NEUTS SEG NFR BLD: 56 % (ref 32–75)
NRBC # BLD: 0 K/UL (ref 0–0.01)
NRBC BLD-RTO: 0 PER 100 WBC
PLATELET # BLD AUTO: 148 K/UL (ref 150–400)
PMV BLD AUTO: 12.1 FL (ref 8.9–12.9)
POTASSIUM SERPL-SCNC: 4.2 MMOL/L (ref 3.5–5.1)
PROT SERPL-MCNC: 6.5 G/DL (ref 6.4–8.2)
RBC # BLD AUTO: 4.41 M/UL (ref 4.1–5.7)
SODIUM SERPL-SCNC: 141 MMOL/L (ref 136–145)
TRIGL SERPL-MCNC: 112 MG/DL (ref ?–150)
VLDLC SERPL CALC-MCNC: 22.4 MG/DL
WBC # BLD AUTO: 4.2 K/UL (ref 4.1–11.1)
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
## 2022-06-16 ENCOUNTER — OFFICE VISIT (OUTPATIENT)
Dept: CARDIOLOGY CLINIC | Age: 87
End: 2022-06-16
Payer: MEDICARE

## 2022-06-16 VITALS
OXYGEN SATURATION: 99 % | WEIGHT: 171 LBS | TEMPERATURE: 97.2 F | HEIGHT: 66 IN | RESPIRATION RATE: 20 BRPM | SYSTOLIC BLOOD PRESSURE: 118 MMHG | HEART RATE: 65 BPM | BODY MASS INDEX: 27.48 KG/M2 | DIASTOLIC BLOOD PRESSURE: 80 MMHG

## 2022-06-16 DIAGNOSIS — R06.09 DOE (DYSPNEA ON EXERTION): ICD-10-CM

## 2022-06-16 DIAGNOSIS — E11.21 TYPE 2 DIABETES WITH NEPHROPATHY (HCC): ICD-10-CM

## 2022-06-16 DIAGNOSIS — I10 ESSENTIAL HYPERTENSION: ICD-10-CM

## 2022-06-16 DIAGNOSIS — E78.2 MIXED HYPERLIPIDEMIA: ICD-10-CM

## 2022-06-16 DIAGNOSIS — I48.0 PAROXYSMAL ATRIAL FIBRILLATION (HCC): Primary | ICD-10-CM

## 2022-06-16 PROCEDURE — 1123F ACP DISCUSS/DSCN MKR DOCD: CPT | Performed by: NURSE PRACTITIONER

## 2022-06-16 PROCEDURE — G8417 CALC BMI ABV UP PARAM F/U: HCPCS | Performed by: NURSE PRACTITIONER

## 2022-06-16 PROCEDURE — G8432 DEP SCR NOT DOC, RNG: HCPCS | Performed by: NURSE PRACTITIONER

## 2022-06-16 PROCEDURE — 99214 OFFICE O/P EST MOD 30 MIN: CPT | Performed by: NURSE PRACTITIONER

## 2022-06-16 PROCEDURE — 3044F HG A1C LEVEL LT 7.0%: CPT | Performed by: NURSE PRACTITIONER

## 2022-06-16 PROCEDURE — 1101F PT FALLS ASSESS-DOCD LE1/YR: CPT | Performed by: NURSE PRACTITIONER

## 2022-06-16 PROCEDURE — G8536 NO DOC ELDER MAL SCRN: HCPCS | Performed by: NURSE PRACTITIONER

## 2022-06-16 PROCEDURE — G8427 DOCREV CUR MEDS BY ELIG CLIN: HCPCS | Performed by: NURSE PRACTITIONER

## 2022-06-16 NOTE — PROGRESS NOTES
Identified pt with two pt identifiers(name and ). Reviewed record in preparation for visit and have obtained necessary documentation. Chief Complaint   Patient presents with    Irregular Heart Beat     3 month follow up    Hypertension      Vitals:    22 1411   BP: 118/80   Pulse: 65   Resp: 20   Temp: 97.2 °F (36.2 °C)   TempSrc: Temporal   SpO2: 99%   Weight: 171 lb (77.6 kg)   Height: 5' 6\" (1.676 m)   PainSc:   5   PainLoc: Shoulder       Medications reviewed/approved by provider. Health Maintenance Review: Patient reminded of \"due or due soon\" health maintenance. I have asked the patient to contact his/her primary care provider (PCP) for follow-up on his/her health maintenance. Coordination of Care Questionnaire:  :   1) Have you been to an emergency room, urgent care, or hospitalized since your last visit? If yes, where when, and reason for visit? no       2. Have seen or consulted any other health care provider since your last visit? If yes, where when, and reason for visit? NO      Patient is accompanied by wife I have received verbal consent from Clark Byrd to discuss any/all medical information while they are present in the room.

## 2022-07-28 ENCOUNTER — HOSPITAL ENCOUNTER (EMERGENCY)
Age: 87
Discharge: HOME | End: 2022-07-28
Attending: EMERGENCY MEDICINE
Payer: MEDICARE

## 2022-07-28 ENCOUNTER — APPOINTMENT (OUTPATIENT)
Dept: GENERAL RADIOLOGY | Age: 87
End: 2022-07-28
Attending: EMERGENCY MEDICINE
Payer: MEDICARE

## 2022-07-28 VITALS
SYSTOLIC BLOOD PRESSURE: 135 MMHG | DIASTOLIC BLOOD PRESSURE: 63 MMHG | HEART RATE: 64 BPM | WEIGHT: 165 LBS | TEMPERATURE: 98.5 F | OXYGEN SATURATION: 100 % | HEIGHT: 66 IN | RESPIRATION RATE: 18 BRPM | BODY MASS INDEX: 26.52 KG/M2

## 2022-07-28 DIAGNOSIS — M62.838 MUSCLE SPASM: ICD-10-CM

## 2022-07-28 DIAGNOSIS — S39.012A BACK STRAIN, INITIAL ENCOUNTER: Primary | ICD-10-CM

## 2022-07-28 LAB
ALBUMIN SERPL-MCNC: 3.5 G/DL (ref 3.5–5)
ALBUMIN/GLOB SERPL: 1.1 {RATIO} (ref 1.1–2.2)
ALP SERPL-CCNC: 113 U/L (ref 45–117)
ALT SERPL-CCNC: 23 U/L (ref 12–78)
ANION GAP SERPL CALC-SCNC: 9 MMOL/L (ref 5–15)
AST SERPL-CCNC: 22 U/L (ref 15–37)
ATRIAL RATE: 66 BPM
BASOPHILS # BLD: 0 K/UL (ref 0–0.1)
BASOPHILS NFR BLD: 0 % (ref 0–1)
BILIRUB SERPL-MCNC: 0.3 MG/DL (ref 0.2–1)
BUN SERPL-MCNC: 20 MG/DL (ref 6–20)
BUN/CREAT SERPL: 15 (ref 12–20)
CALCIUM SERPL-MCNC: 8.8 MG/DL (ref 8.5–10.1)
CALCULATED P AXIS, ECG09: 75 DEGREES
CALCULATED R AXIS, ECG10: 2 DEGREES
CALCULATED T AXIS, ECG11: 45 DEGREES
CHLORIDE SERPL-SCNC: 105 MMOL/L (ref 97–108)
CO2 SERPL-SCNC: 27 MMOL/L (ref 21–32)
CREAT SERPL-MCNC: 1.36 MG/DL (ref 0.7–1.3)
DIAGNOSIS, 93000: NORMAL
DIFFERENTIAL METHOD BLD: ABNORMAL
EOSINOPHIL # BLD: 0.2 K/UL (ref 0–0.4)
EOSINOPHIL NFR BLD: 3 % (ref 0–7)
ERYTHROCYTE [DISTWIDTH] IN BLOOD BY AUTOMATED COUNT: 12.5 % (ref 11.5–14.5)
GLOBULIN SER CALC-MCNC: 3.1 G/DL (ref 2–4)
GLUCOSE SERPL-MCNC: 148 MG/DL (ref 65–100)
HCT VFR BLD AUTO: 41.3 % (ref 36.6–50.3)
HGB BLD-MCNC: 14 G/DL (ref 12.1–17)
IMM GRANULOCYTES # BLD AUTO: 0 K/UL (ref 0–0.04)
IMM GRANULOCYTES NFR BLD AUTO: 0 % (ref 0–0.5)
LYMPHOCYTES # BLD: 1.4 K/UL (ref 0.8–3.5)
LYMPHOCYTES NFR BLD: 29 % (ref 12–49)
MCH RBC QN AUTO: 30.5 PG (ref 26–34)
MCHC RBC AUTO-ENTMCNC: 33.9 G/DL (ref 30–36.5)
MCV RBC AUTO: 90 FL (ref 80–99)
MONOCYTES # BLD: 0.4 K/UL (ref 0–1)
MONOCYTES NFR BLD: 9 % (ref 5–13)
NEUTS SEG # BLD: 2.8 K/UL (ref 1.8–8)
NEUTS SEG NFR BLD: 59 % (ref 32–75)
NRBC # BLD: 0 K/UL (ref 0–0.01)
NRBC BLD-RTO: 0 PER 100 WBC
P-R INTERVAL, ECG05: 180 MS
PLATELET # BLD AUTO: 142 K/UL (ref 150–400)
PMV BLD AUTO: 10 FL (ref 8.9–12.9)
POTASSIUM SERPL-SCNC: 4.1 MMOL/L (ref 3.5–5.1)
PROT SERPL-MCNC: 6.6 G/DL (ref 6.4–8.2)
Q-T INTERVAL, ECG07: 416 MS
QRS DURATION, ECG06: 80 MS
QTC CALCULATION (BEZET), ECG08: 436 MS
RBC # BLD AUTO: 4.59 M/UL (ref 4.1–5.7)
SODIUM SERPL-SCNC: 141 MMOL/L (ref 136–145)
TROPONIN-HIGH SENSITIVITY: 9 NG/L (ref 0–76)
VENTRICULAR RATE, ECG03: 66 BPM
WBC # BLD AUTO: 4.7 K/UL (ref 4.1–11.1)

## 2022-07-28 PROCEDURE — 36415 COLL VENOUS BLD VENIPUNCTURE: CPT

## 2022-07-28 PROCEDURE — 80053 COMPREHEN METABOLIC PANEL: CPT

## 2022-07-28 PROCEDURE — 74011000250 HC RX REV CODE- 250: Performed by: EMERGENCY MEDICINE

## 2022-07-28 PROCEDURE — 99285 EMERGENCY DEPT VISIT HI MDM: CPT

## 2022-07-28 PROCEDURE — 84484 ASSAY OF TROPONIN QUANT: CPT

## 2022-07-28 PROCEDURE — 71045 X-RAY EXAM CHEST 1 VIEW: CPT

## 2022-07-28 PROCEDURE — 74011250637 HC RX REV CODE- 250/637: Performed by: EMERGENCY MEDICINE

## 2022-07-28 PROCEDURE — 93005 ELECTROCARDIOGRAM TRACING: CPT

## 2022-07-28 PROCEDURE — 85025 COMPLETE CBC W/AUTO DIFF WBC: CPT

## 2022-07-28 RX ORDER — LIDOCAINE 50 MG/G
PATCH TOPICAL
Qty: 5 EACH | Refills: 0 | Status: SHIPPED | OUTPATIENT
Start: 2022-07-28 | End: 2022-10-06 | Stop reason: ALTCHOICE

## 2022-07-28 RX ORDER — ACETAMINOPHEN 325 MG/1
650 TABLET ORAL
Status: COMPLETED | OUTPATIENT
Start: 2022-07-28 | End: 2022-07-28

## 2022-07-28 RX ORDER — METHOCARBAMOL 750 MG/1
750 TABLET, FILM COATED ORAL
Qty: 15 TABLET | Refills: 0 | Status: SHIPPED | OUTPATIENT
Start: 2022-07-28

## 2022-07-28 RX ORDER — LIDOCAINE 4 G/100G
2 PATCH TOPICAL EVERY 24 HOURS
Status: DISCONTINUED | OUTPATIENT
Start: 2022-07-28 | End: 2022-07-28 | Stop reason: HOSPADM

## 2022-07-28 RX ADMIN — ACETAMINOPHEN 325MG 650 MG: 325 TABLET ORAL at 15:37

## 2022-07-28 NOTE — ED PROVIDER NOTES
EMERGENCY DEPARTMENT HISTORY AND PHYSICAL EXAM      Date: 7/28/2022  Patient Name: Paulette Peters    Please note that this dictation was completed with [employer], the computer voice recognition software. Quite often unanticipated grammatical, syntax, homophones, and other interpretive errors are inadvertently transcribed by the computer software. Please disregard these errors. Please excuse any errors that have escaped final proofreading. History of Presenting Illness     Chief Complaint   Patient presents with    Back Pain              History Provided By: Patient     HPI: Paulette Peters, 80 y.o. male, with a past medical history significant for diabetes presenting emergency department complaining of left thoracic back pain that began while he was trying to lift something off a shelf at [address] Patient denies any falls or any other trauma. Pain is in the left periscapular region. Is worse with movement of the left arm, worse with movement of his neck. No chest pain, no shortness of breath. No other exacerbating or relieving factors or associated symptoms at this time. Has not taken anything for the pain. Pain is relieved with rest.    PCP: Dell Napier MD    No current facility-administered medications on file prior to encounter. Current Outpatient Medications on File Prior to Encounter   Medication Sig Dispense Refill    metFORMIN (GLUCOPHAGE) 500 mg tablet Take 1 Tablet by mouth two (2) times daily (with meals). 180 Tablet 3    pindoloL (VISKIN) 5 mg tablet TAKE 1 TABLET TWICE A  Tablet 3    pravastatin (PRAVACHOL) 20 mg tablet TAKE 1 TABLET NIGHTLY 90 Tablet 3    pantoprazole (PROTONIX) 40 mg tablet TAKE 1 TABLET DAILY 90 Tab 4    MAGNESIUM CITRATE PO Take  by mouth. fish oil-omega-3 fatty acids 300-500 mg cap Take  by mouth.      loratadine (CLARITIN) 10 mg tablet Take 10 mg by mouth daily. Blood-Glucose Meter monitoring kit Test blood sugar daily.  Dx. E11.65, not on insulin 1 Kit 0    glucose blood VI test strips (BLOOD GLUCOSE TEST) strip Test blood sugar daily. Dx E11.65 not on insulin 100 Strip 3    cinnamon bark-chromium picolin 500-100 mg-mcg cap Take 1 Cap by mouth daily. Indications: DIABETES MELLITUS      aspirin delayed-release 81 mg tablet Take 1 Tab by mouth daily. 100 Tab 3    ferrous sulfate (IRON) 325 mg (65 mg iron) tablet Take  by mouth Daily (before breakfast). Take 1 every other day      betamethasone valerate (VALISONE) 0.1 % ointment Apply  to affected area two (2) times a day. (Patient taking differently: Apply  to affected area two (2) times daily as needed.) 45 g 0    acetaminophen (TYLENOL) 500 mg tablet Take  by mouth every six (6) hours as needed. B.infantis-B.ani-B.long-B.bifi (PROBIOTIC 4X) 10-15 mg TbEC Take  by mouth daily. Past History     Past Medical History:  Past Medical History:   Diagnosis Date    Diabetes mellitus, type 2 (HCC)     DJD (degenerative joint disease)     GERD (gastroesophageal reflux disease)     Hypertension     Orthostatic hypotension     Parasomnia     PUD (peptic ulcer disease)     Pure hypercholesterolemia     Seizure ([facility] [address]        Past Surgical History:  Past Surgical History:   Procedure Laterality Date    ENDOSCOPY, COLON, DIAGNOSTIC  01/2012    HX CHOLECYSTECTOMY  02/2012    laparoscopic    HX GI  01/2012    endoscopy,BX    HX KNEE REPLACEMENT      Bilateral       Family History:  Family History   Problem Relation Age of Onset    COPD Father     COPD Sister     COPD Sister        Social History:  Social History     Tobacco Use    Smoking status: Every Day     Types: Cigars    Smokeless tobacco: Never   Substance Use Topics    Alcohol use: No    Drug use: No       Allergies:  No Known Allergies      Review of Systems   Review of Systems   Constitutional:  Negative for chills and fever. HENT:  Negative for congestion and sore throat. Eyes:  Negative for visual disturbance.    Respiratory:  Negative for cough and shortness of breath. Cardiovascular:  Negative for chest pain and leg swelling. Gastrointestinal:  Negative for abdominal pain, blood in stool, diarrhea and nausea. Endocrine: Negative for polyuria. Genitourinary:  Negative for dysuria and testicular pain. Musculoskeletal:  Positive for back pain. Negative for arthralgias, joint swelling and myalgias. Skin:  Negative for rash. Allergic/Immunologic: Negative for immunocompromised state. Neurological:  Negative for weakness and headaches. Hematological:  Does not bruise/bleed easily. Psychiatric/Behavioral:  Negative for confusion. Physical Exam   Physical Exam  Vitals and nursing note reviewed. Constitutional:       Appearance: He is well-developed. HENT:      Head: Normocephalic and atraumatic. Eyes:      General:         Right eye: No discharge. Left eye: No discharge. Conjunctiva/sclera: Conjunctivae normal.      Pupils: Pupils are equal, round, and reactive to light. Neck:      Trachea: No tracheal deviation. Cardiovascular:      Rate and Rhythm: Normal rate and regular rhythm. Heart sounds: Normal heart sounds. No murmur heard. Pulmonary:      Effort: Pulmonary effort is normal. No respiratory distress. Breath sounds: Normal breath sounds. No wheezing or rales. Abdominal:      General: Bowel sounds are normal.      Palpations: Abdomen is soft. Tenderness: There is no abdominal tenderness. There is no guarding or rebound. Musculoskeletal:      Cervical back: Normal range of motion and neck supple. Comments: No midline point tenderness in the neck. No midline point tenderness in the thoracic spine. Patient has tenderness to palpation in the periscapular and paravertebral muscles on the left. Pain is reproducible with range of motion of the neck, range of motion of the arm. Skin:     General: Skin is warm and dry. Findings: No erythema or rash.    Neurological:      Mental Status: He is alert and oriented to person, place, and time. Psychiatric:         Behavior: Behavior normal.       Diagnostic Study Results     Labs -     Recent Results (from the past 12 hour(s))   CBC WITH AUTOMATED DIFF    Collection Time: 07/28/22  3:31 PM   Result Value Ref Range    WBC 4.7 4.1 - 11.1 K/uL    RBC 4.59 4. 10 - 5.70 M/uL    HGB 14.0 12.1 - 17.0 g/dL    HCT 41.3 36.6 - 50.3 %    MCV 90.0 80.0 - 99.0 FL    MCH 30.5 26.0 - 34.0 PG    MCHC 33.9 30.0 - 36.5 g/dL    RDW 12.5 11.5 - 14.5 %    PLATELET 514 (L) 763 - 400 K/uL    MPV 10.0 8.9 - 12.9 FL    NRBC 0.0 0  WBC    ABSOLUTE NRBC 0.00 0.00 - 0.01 K/uL    NEUTROPHILS 59 32 - 75 %    LYMPHOCYTES 29 12 - 49 %    MONOCYTES 9 5 - 13 %    EOSINOPHILS 3 0 - 7 %    BASOPHILS 0 0 - 1 %    IMMATURE GRANULOCYTES 0 0.0 - 0.5 %    ABS. NEUTROPHILS 2.8 1.8 - 8.0 K/UL    ABS. LYMPHOCYTES 1.4 0.8 - 3.5 K/UL    ABS. MONOCYTES 0.4 0.0 - 1.0 K/UL    ABS. EOSINOPHILS 0.2 0.0 - 0.4 K/UL    ABS. BASOPHILS 0.0 0.0 - 0.1 K/UL    ABS. IMM. GRANS. 0.0 0.00 - 0.04 K/UL    DF AUTOMATED     METABOLIC PANEL, COMPREHENSIVE    Collection Time: 07/28/22  3:31 PM   Result Value Ref Range    Sodium 141 136 - 145 mmol/L    Potassium 4.1 3.5 - 5.1 mmol/L    Chloride 105 97 - 108 mmol/L    CO2 27 21 - 32 mmol/L    Anion gap 9 5 - 15 mmol/L    Glucose 148 (H) 65 - 100 mg/dL    BUN 20 6 - 20 MG/DL    Creatinine 1.36 (H) 0.70 - 1.30 MG/DL    BUN/Creatinine ratio 15 12 - 20      GFR est AA 60 (L) >60 ml/min/1.73m2    GFR est non-AA 49 (L) >60 ml/min/1.73m2    Calcium 8.8 8.5 - 10.1 MG/DL    Bilirubin, total 0.3 0.2 - 1.0 MG/DL    ALT (SGPT) 23 12 - 78 U/L    AST (SGOT) 22 15 - 37 U/L    Alk.  phosphatase 113 45 - 117 U/L    Protein, total 6.6 6.4 - 8.2 g/dL    Albumin 3.5 3.5 - 5.0 g/dL    Globulin 3.1 2.0 - 4.0 g/dL    A-G Ratio 1.1 1.1 - 2.2     TROPONIN-HIGH SENSITIVITY    Collection Time: 07/28/22  3:31 PM   Result Value Ref Range    Troponin-High Sensitivity 9 0 - 76 ng/L EKG, 12 LEAD, INITIAL    Collection Time: 07/28/22  4:40 PM   Result Value Ref Range    Ventricular Rate 66 BPM    Atrial Rate 66 BPM    P-R Interval 180 ms    QRS Duration 80 ms    Q-T Interval 416 ms    QTC Calculation (Bezet) 436 ms    Calculated P Axis 75 degrees    Calculated R Axis 2 degrees    Calculated T Axis 45 degrees    Diagnosis       Sinus rhythm with premature atrial complexes with aberrant conduction  Otherwise normal ECG  No previous ECGs available         Radiologic Studies -   XR CHEST PORT   Final Result   Bibasilar atelectasis or minimal scar. CT Results  (Last 48 hours)      None          CXR Results  (Last 48 hours)                 07/28/22 1526  XR CHEST PORT Final result    Impression:  Bibasilar atelectasis or minimal scar. Narrative:  INDICATION:  thoracic back pain        EXAM: Chest single view. COMPARISON: 8/23/2021. FINDINGS: A single frontal view of the chest at 1527 hours shows stable minimal   atelectasis or scar in the right lung base and slightly more conspicuous   atelectasis or scar in the left lung base. .  The heart, mediastinum and   pulmonary vasculature are stable . The bony thorax is unremarkable for age. .                     Medical Decision Making   I am the first provider for this patient. I reviewed the vital signs, available nursing notes, past medical history, past surgical history, family history and social history. Vital Signs-Reviewed the patient's vital signs. Patient Vitals for the past 12 hrs:   Temp Pulse Resp BP SpO2   07/28/22 1647 -- 64 18 135/63 100 %   07/28/22 1643 -- 68 17 -- 100 %   07/28/22 1459 98.5 °F (36.9 °C) 70 18 (!) 166/72 96 %       EKG interpretation: (Preliminary)  EKG shows sinus rhythm, PACs. Rate 66. No evidence of acute ischemic ST or T wave changes.   Interpreted by me    Records Reviewed:   Nursing notes, Prior visits     Provider Notes (Medical Decision Making):   Patient evaluated found to be in no acute cardiopulmonary distress, doubt ACS, doubt PE, doubt dissection, doubt pneumothorax. Appears most consistent with musculoskeletal pain. He is point tender in his periscapular region. He reports having multiple similar previous episodes of musculoskeletal leg pain. We will check cardiac biomarkers given advanced age despite low clinical suspicion. We will check a chest x-ray. ED Course:   Initial assessment performed. The patients presenting problems have been discussed, and they are in agreement with the care plan formulated and outlined with them. I have encouraged them to ask questions as they arise throughout their visit. Critical Care Time:   None      Disposition:    DISCHARGE NOTE  Patients results have been reviewed with them. Patient and/or family have verbally conveyed their understanding and agreement of the patient's signs, symptoms, diagnosis, treatment and prognosis and additionally agree to follow up as recommended or return to the Emergency Room should their condition change or have any new concerns prior to their follow-up appointment. Patient verbally agrees with the care-plan and verbally conveys that all of their questions have been answered. Discharge instructions have also been provided to the patient with some educational information regarding their diagnosis as well a list of reasons why they would want to return to the ER prior to their follow-up appointment should their condition change. PLAN:  1. Current Discharge Medication List        START taking these medications    Details   methocarbamoL (Robaxin-750) 750 mg tablet Take 1 Tablet by mouth three (3) times daily as needed for Muscle Spasm(s). Qty: 15 Tablet, Refills: 0  Start date: 7/28/2022      lidocaine (Lidoderm) 5 % Apply patch to the affected area for 12 hours a day and remove for 12 hours a day. Qty: 5 Each, Refills: 0  Start date: 7/28/2022           2.    Follow-up Information Follow up With Specialties Details Why Contact Info    Ry Elliott MD Internal Medicine Physician Schedule an appointment as soon as possible for a visit   [facility] [phone]  [phone]      [address] [facility] Emergency Medicine  If symptoms worsen [address] [id number] [phone]            Return to ED if worse     Diagnosis     Clinical Impression:   1. Back strain, initial encounter    2. Muscle spasm        Attestations:   This note was completed by Clarita Adame DO

## 2022-07-28 NOTE — ED NOTES
I have reviewed discharge instructions with the patient. The patient verbalized understanding. Medications discussed with patient, patient verbalized understanding.

## 2022-07-28 NOTE — ED TRIAGE NOTES
Pt arrived by EMS with back pain. Per pt he was at [facility], [facility] around 1000 where he  something heavy went home and developed back pain. EMS placed a c-collar on pt because pt complained of pain in his back and on movement he was having pain in his neck. Pt arrived awake alert and oriented X 4, pt is [place].   Pt educated on ER flow

## 2022-10-02 NOTE — PROGRESS NOTES
Mr. Chandan Corbett is a 80 y.o. male who is here for evaluation of   Chief Complaint   Patient presents with    Annual Wellness Visit    Diabetes     Reports average 150s-160. Very rarely over 200    Hypertension    Immunization/Injection     Flu vaccine provided today    Labs     Due for routine labs    Medication Evaluation     Patient states he is only taking 3 medications. Wife usually does his pill boxes, she is not here today    Back Pain     C/O back spasms. Shoulder Pain     C/O (B) shoulder pain. R>L   . ASSESSMENT AND PLAN:    1. Medicare annual wellness visit, subsequent  2. Type 2 diabetes with nephropathy (HCC)  Due for A1c  3. Paroxysmal atrial fibrillation (HCC)  Rate controlled. 4. Needs flu shot    - INFLUENZA, FLUAD, (AGE 72 Y+), IM, PF, 0.5 ML      Orders Placed This Encounter    Influenza Vaccine, QUAD, 65 Yrs +  IM  (Fluad 00333 )           HPI  60-year-old gentleman with a history of diabetes which has been well controlled with metformin, paroxysmal atrial fibrillation (pindolol) and hyperlipidemia (pravastatin) returns today for interval assessment of multiple medical problems and for subsequent annual wellness visit. Retired [employer]. ROS:  Denies  fever, chills, cough, chest pain, SOB,  nausea, vomiting, or diarrhea. Denies wt loss, wt gain, hemoptysis, hematochezia or melena. Physical Examination:    Visit Vitals  /80 (BP 1 Location: Left upper arm, BP Patient Position: Sitting, BP Cuff Size: Adult long)   Pulse 67   Temp 97.2 °F (36.2 °C) (Temporal)   Resp 18   Ht 5' 6\" (1.676 m)   Wt 168 lb (76.2 kg)   SpO2 98%   BMI 27.12 kg/m²      General:  Alert, cooperative, no distress. Head:  Normocephalic, without obvious abnormality, atraumatic. Eyes:  Conjunctivae/corneas clear. Pupils equal, round, reactive to light. Extraocular movements intact. Lungs:   Clear to auscultation bilaterally. Chest wall:  No tenderness or deformity.    Cardiac:  RRR Abdomen:   Soft, non-tender. Bowel sounds normal. No masses. No organomegaly. Extremities: Extremities normal, atraumatic, no cyanosis or edema. Pulses: 2+ and symmetric all extremities. Skin: Skin color, texture, turgor normal. No rashes or lesions. Lymph nodes: Cervical, supraclavicular, and axillary nodes normal.   Neurologic: CNII-XII intact. Normal strength, sensation, and reflexes throughout. On this date 10/06/2022 I have spent 30 minutes reviewing previous notes, test results and face to face with the patient discussing the diagnosis and importance of compliance with the treatment plan as well as documenting on the day of the visit.     Donis Israel MD FACP    (signed electronically) on 10/6/2022 at 10:51 AM

## 2022-10-06 ENCOUNTER — OFFICE VISIT (OUTPATIENT)
Dept: FAMILY MEDICINE CLINIC | Age: 87
End: 2022-10-06
Payer: MEDICARE

## 2022-10-06 VITALS
SYSTOLIC BLOOD PRESSURE: 130 MMHG | OXYGEN SATURATION: 98 % | HEART RATE: 67 BPM | TEMPERATURE: 97.2 F | BODY MASS INDEX: 27 KG/M2 | HEIGHT: 66 IN | DIASTOLIC BLOOD PRESSURE: 80 MMHG | WEIGHT: 168 LBS | RESPIRATION RATE: 18 BRPM

## 2022-10-06 DIAGNOSIS — E11.21 TYPE 2 DIABETES WITH NEPHROPATHY (HCC): ICD-10-CM

## 2022-10-06 DIAGNOSIS — Z23 NEEDS FLU SHOT: ICD-10-CM

## 2022-10-06 DIAGNOSIS — Z00.00 MEDICARE ANNUAL WELLNESS VISIT, SUBSEQUENT: Primary | ICD-10-CM

## 2022-10-06 DIAGNOSIS — I48.0 PAROXYSMAL ATRIAL FIBRILLATION (HCC): ICD-10-CM

## 2022-10-06 DIAGNOSIS — I10 PRIMARY HYPERTENSION: ICD-10-CM

## 2022-10-06 PROCEDURE — 99214 OFFICE O/P EST MOD 30 MIN: CPT | Performed by: INTERNAL MEDICINE

## 2022-10-06 PROCEDURE — 90694 VACC AIIV4 NO PRSRV 0.5ML IM: CPT | Performed by: INTERNAL MEDICINE

## 2022-10-06 PROCEDURE — G0439 PPPS, SUBSEQ VISIT: HCPCS | Performed by: INTERNAL MEDICINE

## 2022-10-06 PROCEDURE — G0008 ADMIN INFLUENZA VIRUS VAC: HCPCS | Performed by: INTERNAL MEDICINE

## 2022-10-06 NOTE — PROGRESS NOTES
Corine Saba is a 80 y.o. male presenting for/with:    Chief Complaint   Patient presents with    Annual Wellness Visit    Diabetes     Reports average 150s-160. Very rarely over 200    Hypertension    Immunization/Injection     Flu vaccine provided today    Labs     Due for routine labs    Medication Evaluation     Patient states he is only taking 3 medications. Wife usually does his pill boxes, she is not here today    Back Pain     C/O back spasms. Shoulder Pain     C/O (B) shoulder pain. R>L       Visit Vitals  /80 (BP 1 Location: Left upper arm, BP Patient Position: Sitting, BP Cuff Size: Adult long)   Pulse 67   Temp 97.2 °F (36.2 °C) (Temporal)   Resp 18   Ht 5' 6\" (1.676 m)   Wt 168 lb (76.2 kg)   SpO2 98%   BMI 27.12 kg/m²     Pain Scale: 2/10  Pain Location: Shoulder    1. \"Have you been to the ER, urgent care clinic since your last visit? Hospitalized since your last visit? \" No    2. \"Have you seen or consulted any other health care providers outside of the [address] [place] [address] since your last visit? \" No     3. For patients aged 39-70: Has the patient had a colonoscopy / FIT/ Cologuard? NA - based on age      If the patient is female:    4. For patients aged 41-77: Has the patient had a mammogram within the past 2 years? NA - based on age or sex      11. For patients aged 21-65: Has the patient had a pap smear? NA - based on age or sex      Learning Assessment 6/16/2022   PRIMARY LEARNER Patient   [address]  CAREGIVER Yes   CO-LEARNER NAME Komal Cardoza   PRIMARY LANGUAGE ENGLISH   LEARNER PREFERENCE PRIMARY DEMONSTRATION     -   ANSWERED BY pt   RELATIONSHIP SELF     Fall Risk Assessment, last 12 mths 10/6/2022   Able to walk? Yes   Fall in past 12 months? 0   Do you feel unsteady? 0   Are you worried about falling 0   Is the gait abnormal? -   Number of falls in past 12 months -   Fall with injury?  -       3 most recent PHQ Screens 10/6/2022   Little interest or pleasure in doing things Not at all   Feeling down, depressed, irritable, or hopeless Not at all   Total Score PHQ 2 0     Abuse Screening Questionnaire 10/6/2022   Do you ever feel afraid of your partner? N   Are you in a relationship with someone who physically or mentally threatens you? N   Is it safe for you to go home? Y       ADL Assessment 10/6/2022   Feeding yourself No Help Needed   Getting from bed to chair No Help Needed   Getting dressed No Help Needed   Bathing or showering No Help Needed   Walk across the room (includes cane/walker) No Help Needed   Using the telphone No Help Needed   Taking your medications No Help Needed   Preparing meals No Help Needed   Managing money (expenses/bills) No Help Needed   Moderately strenuous housework (laundry) No Help Needed   Shopping for personal items (toiletries/medicines) No Help Needed   Shopping for groceries No Help Needed   Driving No Help Needed   Climbing a flight of stairs No Help Needed   Getting to places beyond walking distances No Help Needed      Advance Care Planning 10/6/2022   Patient's Healthcare Decision Maker is: Named in scanned ACP document   Confirm Advance Directive Yes, on file      This is the Subsequent Medicare Annual Wellness Exam, performed 12 months or more after the Initial AWV or the last Subsequent AWV    I have reviewed the patient's medical history in detail and updated the computerized patient record. Assessment/Plan   Education and counseling provided:  Are appropriate based on today's review and evaluation  Influenza Vaccine    1. Medicare annual wellness visit, subsequent  2. Type 2 diabetes with nephropathy (HCC)  3. Paroxysmal atrial fibrillation ([facility] [address]  4.  Needs flu shot  -     INFLUENZA, FLUAD, (AGE 72 Y+), IM, PF, 0.5 ML       Depression Risk Factor Screening     3 most recent PHQ Screens 10/6/2022   Little interest or pleasure in doing things Not at all   Feeling down, depressed, irritable, or hopeless Not at all   Total Score PHQ 2 0       Alcohol & Drug Abuse Risk Screen    Do you average more than 1 drink per night or more than 7 drinks a week: No    In the past three months have you have had more than 4 drinks containing alcohol on one occasion: No          Functional Ability and Level of Safety    Hearing: Hearing is good. Wears hearing aids      Activities of Daily Living: The home contains: no safety equipment. Patient does total self care      Ambulation: with no difficulty     Fall Risk:  Fall Risk Assessment, last 12 mths 10/6/2022   Able to walk? Yes   Fall in past 12 months? 0   Do you feel unsteady? 0   Are you worried about falling 0   Is the gait abnormal? -   Number of falls in past 12 months -   Fall with injury?  -      Abuse Screen:  Patient is not abused       Cognitive Screening    Has your family/caregiver stated any concerns about your memory: no     Health Maintenance Due     Health Maintenance Due   Topic Date Due    Eye Exam Retinal or Dilated  08/08/2020    COVID-19 Vaccine (4 - Booster for Moderna series) 02/25/2022    Foot Exam Q1  07/29/2022    Flu Vaccine (1) 08/01/2022    MICROALBUMIN Q1  08/23/2022       Patient Care Team   Patient Care Team:  Marlene Gupta MD as PCP - General (Internal Medicine Physician)  Marlene Gupta [facility]CP [facility]  Hermes Byrne MD (Orthopedic Surgery)  Moni Bradley MD (Cardiovascular Disease Physician)    History     Patient Active Problem List   Diagnosis Code    Hypertension I10    Pure hypercholesterolemia E78.00    Orthostatic hypotension I95.1    Type 2 diabetes with nephropathy ([address] E11.21    Atrial fibrillation ([address] I48.91    Essential tremor G25.0     Past Medical History:   Diagnosis Date    Diabetes mellitus, type 2 ([address]     DJD (degenerative joint disease)     GERD (gastroesophageal reflux disease)     Hypertension     Orthostatic hypotension     Parasomnia     PUD (peptic ulcer disease)     Pure hypercholesterolemia     Seizure ([address] Past Surgical History:   Procedure Laterality Date    ENDOSCOPY, COLON, DIAGNOSTIC  01/2012    HX CHOLECYSTECTOMY  02/2012    laparoscopic    HX GI  01/2012    endoscopy,BX    HX KNEE REPLACEMENT      Bilateral     Current Outpatient Medications   Medication Sig Dispense Refill    methocarbamoL (Robaxin-750) 750 mg tablet Take 1 Tablet by mouth three (3) times daily as needed for Muscle Spasm(s). 15 Tablet 0    lidocaine (Lidoderm) 5 % Apply patch to the affected area for 12 hours a day and remove for 12 hours a day. 5 Each 0    metFORMIN (GLUCOPHAGE) 500 mg tablet Take 1 Tablet by mouth two (2) times daily (with meals). 180 Tablet 3    pindoloL (VISKIN) 5 mg tablet TAKE 1 TABLET TWICE A  Tablet 3    pravastatin (PRAVACHOL) 20 mg tablet TAKE 1 TABLET NIGHTLY 90 Tablet 3    pantoprazole (PROTONIX) 40 mg tablet TAKE 1 TABLET DAILY 90 Tab 4    MAGNESIUM CITRATE PO Take  by mouth. fish oil-omega-3 fatty acids 300-500 mg cap Take  by mouth.      loratadine (CLARITIN) 10 mg tablet Take 10 mg by mouth daily. Blood-Glucose Meter monitoring kit Test blood sugar daily. Dx. E11.65, not on insulin 1 Kit 0    glucose blood VI test strips (BLOOD GLUCOSE TEST) strip Test blood sugar daily. Dx E11.65 not on insulin 100 Strip 3    cinnamon bark-chromium picolin 500-100 mg-mcg cap Take 1 Cap by mouth daily. Indications: DIABETES MELLITUS      aspirin delayed-release 81 mg tablet Take 1 Tab by mouth daily. 100 Tab 3    ferrous sulfate (IRON) 325 mg (65 mg iron) tablet Take  by mouth Daily (before breakfast). Take 1 every other day      betamethasone valerate (VALISONE) 0.1 % ointment Apply  to affected area two (2) times a day. (Patient taking differently: Apply  to affected area two (2) times daily as needed.) 45 g 0    acetaminophen (TYLENOL) 500 mg tablet Take  by mouth every six (6) hours as needed. B.infantis-B.ani-B.long-B.bifi (PROBIOTIC 4X) 10-15 mg TbEC Take  by mouth daily.        No Known Allergies    Family History   Problem Relation Age of Onset    COPD Father     COPD Sister     COPD Sister      Social History     Tobacco Use    Smoking status: Every Day     Types: Cigars    Smokeless tobacco: Never   Substance Use Topics    Alcohol use: No       After obtaining consent, and per orders of Dr. Loreto Gardiner, injection of Fluad to (L) Deltoid given by [address] [place] [address]. Patient instructed to remain in clinic for 20 minutes afterwards, and to report any adverse reaction to me immediately.     [address] [place] [address]

## 2022-10-06 NOTE — PATIENT INSTRUCTIONS
Medicare Wellness Visit, Male    The best way to live healthy is to have a lifestyle where you eat a well-balanced diet, exercise regularly, limit alcohol use, and quit all forms of tobacco/nicotine, if applicable. Regular preventive services are another way to keep healthy. Preventive services (vaccines, screening tests, monitoring & exams) can help personalize your care plan, which helps you manage your own care. Screening tests can find health problems at the earliest stages, when they are easiest to treat. [facility] follows the current, evidence-based guidelines published by the [facility] ([facility]) when recommending preventive services for our patients. Because we follow these guidelines, sometimes recommendations change over time as research supports it. (For example, a prostate screening blood test is no longer routinely recommended for men with no symptoms). Of course, you and your doctor may decide to screen more often for some diseases, based on your risk and co-morbidities (chronic disease you are already diagnosed with). Preventive services for you include:  - Medicare offers their members a free annual wellness visit, which is time for you and your primary care provider to discuss and plan for your preventive service needs. Take advantage of this benefit every year!  -All adults over age 72 should receive the recommended pneumonia vaccines. Current USPSTF guidelines recommend a series of two vaccines for the best pneumonia protection.   -All adults should have a flu vaccine yearly and tetanus vaccine every 10 years.  -All adults age 48 and older should receive the shingles vaccines (series of two vaccines).        -All adults age 38-68 who are overweight should have a diabetes screening test once every three years.   -Other screening tests & preventive services for persons with diabetes include: an eye exam to screen for diabetic retinopathy, a kidney function test, a foot exam, and stricter control over your cholesterol.   -Cardiovascular screening for adults with routine risk involves an electrocardiogram (ECG) at intervals determined by the provider.   -Colorectal cancer screening should be done for adults age 54-65 with no increased risk factors for colorectal cancer. There are a number of acceptable methods of screening for this type of cancer. Each test has its own benefits and drawbacks. Discuss with your provider what is most appropriate for you during your annual wellness visit. The different tests include: colonoscopy (considered the best screening method), a fecal occult blood test, a fecal DNA test, and sigmoidoscopy.  -All adults born between [facility] should be screened once for Hepatitis C.  -An Abdominal Aortic Aneurysm (AAA) Screening is recommended for men age 73-68 who has ever smoked in their lifetime. Here is a list of your current Health Maintenance items (your personalized list of preventive services) with a due date:  Health Maintenance Due   Topic Date Due    Eye Exam  08/08/2020    COVID-19 Vaccine (4 - Booster for Moderna series) 02/25/2022    Diabetic Foot Care  07/29/2022    Yearly Flu Vaccine (1) 08/01/2022    Albumin Urine Test  08/23/2022       Vaccine Information Statement    Influenza (Flu) Vaccine (Inactivated or Recombinant): What You Need to Know    Many vaccine information statements are available in [place] and other languages. See www.immunize.org/vis. Hojas de información sobre vacunas están disponibles en español y en muchos otros idiomas. Visite www.immunize.org/vis. 1. Why get vaccinated? Influenza vaccine can prevent influenza (flu). Flu is a contagious disease that spreads around the [place] every year, usually between October and May. Anyone can get the flu, but it is more dangerous for some people.  Infants and young children, people 72 years and older, pregnant people, and people with certain health conditions or a weakened immune system are at greatest risk of flu complications. Pneumonia, bronchitis, sinus infections, and ear infections are examples of flu-related complications. If you have a medical condition, such as heart disease, cancer, or diabetes, flu can make it worse. Flu can cause fever and chills, sore throat, muscle aches, fatigue, cough, headache, and runny or stuffy nose. Some people may have vomiting and diarrhea, though this is more common in children than adults. In an average year, thousands of people in the [facility] die from flu, and many more are hospitalized. Flu vaccine prevents millions of illnesses and flu-related visits to the doctor each year. 2. Influenza vaccines     CDC recommends everyone 6 months and older get vaccinated every flu season. Children 6 months through 6years of age may need 2 doses during a single flu season. Everyone else needs only 1 dose each flu season. It takes about 2 weeks for protection to develop after vaccination. There are many flu viruses, and they are always changing. Each year a new flu vaccine is made to protect against the influenza viruses believed to be likely to cause disease in the upcoming flu season. Even when the vaccine doesnt exactly match these viruses, it may still provide some protection. Influenza vaccine does not cause flu. Influenza vaccine may be given at the same time as other vaccines. 3. Talk with your health care provider    Tell your vaccination provider if the person getting the vaccine:  Has had an allergic reaction after a previous dose of influenza vaccine, or has any severe, life-threatening allergies   Has ever had Guillain-Barré Syndrome (also called GBS)    In some cases, your health care provider may decide to postpone influenza vaccination until a future visit. Influenza vaccine can be administered at any time during pregnancy.  People who are or will be pregnant during influenza season should receive inactivated influenza vaccine. People with minor illnesses, such as a cold, may be vaccinated. People who are moderately or severely ill should usually wait until they recover before getting influenza vaccine. Your health care provider can give you more information. 4. Risks of a vaccine reaction    Soreness, redness, and swelling where the shot is given, fever, muscle aches, and headache can happen after influenza vaccination. There may be a very small increased risk of Guillain-Barré Syndrome (GBS) after inactivated influenza vaccine (the flu shot). Teetee Zhang children who get the flu shot along with pneumococcal vaccine (PCV13) and/or DTaP vaccine at the same time might be slightly more likely to have a seizure caused by fever. Tell your health care provider if a child who is getting flu vaccine has ever had a seizure. People sometimes faint after medical procedures, including vaccination. Tell your provider if you feel dizzy or have vision changes or ringing in the ears. As with any medicine, there is a very remote chance of a vaccine causing a severe allergic reaction, other serious injury, or death. 5. What if there is a serious problem? An allergic reaction could occur after the vaccinated person leaves the clinic. If you see signs of a severe allergic reaction (hives, swelling of the face and throat, difficulty breathing, a fast heartbeat, dizziness, or weakness), call 9-1-1 and get the person to the nearest hospital.    For other signs that concern you, call your health care provider. Adverse reactions should be reported to the Vaccine Adverse Event Reporting System (VAERS). Your health care provider will usually file this report, or you can do it yourself. Visit the VAERS website at www.vaers. hhs.gov or call 0-383.110.2748. VAERS is only for reporting reactions, and VAERS staff members do not give medical advice.     6. The National Vaccine Injury Compensation Program    The National Vaccine Injury Compensation Program (VICP) is a federal program that was created to compensate people who may have been injured by certain vaccines. Claims regarding alleged injury or death due to vaccination have a time limit for filing, which may be as short as two years. Visit the VICP website at www.hrsa.gov/vaccinecompensation or call 4-568.741.4874 to learn about the program and about filing a claim. 7. How can I learn more? Ask your health care provider. Call your local or state health department. Visit the website of the Food and Drug Administration (FDA) for vaccine package inserts and additional information at www.fda.gov/vaccines-blood-biologics/vaccines. Contact the Centers for Disease Control and Prevention (CDC): Call 6-758.253.7194 (1-800-CDC-INFO) or  Visit CDCs influenza website at www.cdc.gov/flu. Vaccine Information Statement   Inactivated Influenza Vaccine   8/6/2021  42 SKYE Santacruz [id number]-35   Department of Health and Human Services  Centers for Disease Control and Prevention    Office Use Only

## 2022-10-07 LAB
ALBUMIN SERPL-MCNC: 4.1 G/DL (ref 3.5–5)
ALBUMIN/GLOB SERPL: 1.3 {RATIO} (ref 1.1–2.2)
ALP SERPL-CCNC: 133 U/L (ref 45–117)
ALT SERPL-CCNC: 26 U/L (ref 12–78)
ANION GAP SERPL CALC-SCNC: 8 MMOL/L (ref 5–15)
AST SERPL-CCNC: 25 U/L (ref 15–37)
BASOPHILS # BLD: 0 K/UL (ref 0–0.1)
BASOPHILS NFR BLD: 0 % (ref 0–1)
BILIRUB SERPL-MCNC: 0.4 MG/DL (ref 0.2–1)
BUN SERPL-MCNC: 17 MG/DL (ref 6–20)
BUN/CREAT SERPL: 13 (ref 12–20)
CALCIUM SERPL-MCNC: 8.9 MG/DL (ref 8.5–10.1)
CHLORIDE SERPL-SCNC: 105 MMOL/L (ref 97–108)
CHOLEST SERPL-MCNC: 156 MG/DL
CO2 SERPL-SCNC: 29 MMOL/L (ref 21–32)
CREAT SERPL-MCNC: 1.28 MG/DL (ref 0.7–1.3)
DIFFERENTIAL METHOD BLD: ABNORMAL
EOSINOPHIL # BLD: 0.2 K/UL (ref 0–0.4)
EOSINOPHIL NFR BLD: 4 % (ref 0–7)
ERYTHROCYTE [DISTWIDTH] IN BLOOD BY AUTOMATED COUNT: 12.4 % (ref 11.5–14.5)
EST. AVERAGE GLUCOSE BLD GHB EST-MCNC: 134 MG/DL
GLOBULIN SER CALC-MCNC: 3.1 G/DL (ref 2–4)
GLUCOSE SERPL-MCNC: 124 MG/DL (ref 65–100)
HBA1C MFR BLD: 6.3 % (ref 4–5.6)
HCT VFR BLD AUTO: 46.2 % (ref 36.6–50.3)
HDLC SERPL-MCNC: 52 MG/DL
HDLC SERPL: 3 {RATIO} (ref 0–5)
HGB BLD-MCNC: 15.1 G/DL (ref 12.1–17)
IMM GRANULOCYTES # BLD AUTO: 0 K/UL (ref 0–0.04)
IMM GRANULOCYTES NFR BLD AUTO: 0 % (ref 0–0.5)
LDLC SERPL CALC-MCNC: 90.4 MG/DL (ref 0–100)
LYMPHOCYTES # BLD: 1.5 K/UL (ref 0.8–3.5)
LYMPHOCYTES NFR BLD: 28 % (ref 12–49)
MCH RBC QN AUTO: 31.1 PG (ref 26–34)
MCHC RBC AUTO-ENTMCNC: 32.7 G/DL (ref 30–36.5)
MCV RBC AUTO: 95.3 FL (ref 80–99)
MONOCYTES # BLD: 0.5 K/UL (ref 0–1)
MONOCYTES NFR BLD: 9 % (ref 5–13)
NEUTS SEG # BLD: 3.2 K/UL (ref 1.8–8)
NEUTS SEG NFR BLD: 58 % (ref 32–75)
NRBC # BLD: 0 K/UL (ref 0–0.01)
NRBC BLD-RTO: 0 PER 100 WBC
PLATELET # BLD AUTO: 143 K/UL (ref 150–400)
PMV BLD AUTO: 11.7 FL (ref 8.9–12.9)
POTASSIUM SERPL-SCNC: 4.3 MMOL/L (ref 3.5–5.1)
PROT SERPL-MCNC: 7.2 G/DL (ref 6.4–8.2)
RBC # BLD AUTO: 4.85 M/UL (ref 4.1–5.7)
SODIUM SERPL-SCNC: 142 MMOL/L (ref 136–145)
TRIGL SERPL-MCNC: 68 MG/DL (ref ?–150)
TSH SERPL DL<=0.05 MIU/L-ACNC: 3.28 UIU/ML (ref 0.36–3.74)
VLDLC SERPL CALC-MCNC: 13.6 MG/DL
WBC # BLD AUTO: 5.4 K/UL (ref 4.1–11.1)

## 2022-10-10 NOTE — PROGRESS NOTES
Thyroid level is good. CBC looks good. Cholesterol is good. Liver and kidneys are good. A1c is OK at 6.3%.

## 2022-10-11 NOTE — PROGRESS NOTES
Patient identified x2 factors and notified of lab results. All questions answered at this time.  Patient able to review labs via [address]
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
## 2022-11-22 ENCOUNTER — OFFICE VISIT (OUTPATIENT)
Dept: CARDIOLOGY CLINIC | Age: 87
End: 2022-11-22
Payer: MEDICARE

## 2022-11-22 VITALS
BODY MASS INDEX: 27.16 KG/M2 | DIASTOLIC BLOOD PRESSURE: 70 MMHG | WEIGHT: 169 LBS | RESPIRATION RATE: 20 BRPM | HEIGHT: 66 IN | SYSTOLIC BLOOD PRESSURE: 130 MMHG | HEART RATE: 70 BPM | TEMPERATURE: 98.3 F | OXYGEN SATURATION: 98 %

## 2022-11-22 DIAGNOSIS — I10 ESSENTIAL HYPERTENSION: ICD-10-CM

## 2022-11-22 DIAGNOSIS — R00.2 PALPITATIONS: ICD-10-CM

## 2022-11-22 DIAGNOSIS — R06.09 DOE (DYSPNEA ON EXERTION): ICD-10-CM

## 2022-11-22 DIAGNOSIS — I48.0 PAROXYSMAL ATRIAL FIBRILLATION (HCC): Primary | ICD-10-CM

## 2022-11-22 DIAGNOSIS — E78.2 MIXED HYPERLIPIDEMIA: ICD-10-CM

## 2022-11-22 PROCEDURE — G8417 CALC BMI ABV UP PARAM F/U: HCPCS | Performed by: NURSE PRACTITIONER

## 2022-11-22 PROCEDURE — G8432 DEP SCR NOT DOC, RNG: HCPCS | Performed by: NURSE PRACTITIONER

## 2022-11-22 PROCEDURE — G8427 DOCREV CUR MEDS BY ELIG CLIN: HCPCS | Performed by: NURSE PRACTITIONER

## 2022-11-22 PROCEDURE — 99214 OFFICE O/P EST MOD 30 MIN: CPT | Performed by: NURSE PRACTITIONER

## 2022-11-22 PROCEDURE — 1101F PT FALLS ASSESS-DOCD LE1/YR: CPT | Performed by: NURSE PRACTITIONER

## 2022-11-22 PROCEDURE — G8536 NO DOC ELDER MAL SCRN: HCPCS | Performed by: NURSE PRACTITIONER

## 2022-11-22 PROCEDURE — 1123F ACP DISCUSS/DSCN MKR DOCD: CPT | Performed by: NURSE PRACTITIONER

## 2023-01-18 RX ORDER — PRAVASTATIN SODIUM 20 MG/1
TABLET ORAL
Qty: 90 TABLET | Refills: 3 | Status: SHIPPED | OUTPATIENT
Start: 2023-01-18

## 2023-01-31 RX ORDER — PINDOLOL 5 MG/1
TABLET ORAL
Qty: 180 TABLET | Refills: 1 | Status: SHIPPED | OUTPATIENT
Start: 2023-01-31

## 2023-01-31 NOTE — TELEPHONE ENCOUNTER
PCP: Presley Barnes MD    Last appt: 11/22/2022  Future Appointments   Date Time Provider Levon Crum   2/6/2023 10:50 AM Leonel Marlin, Tere Gaucher, MD [facility] MAIN BS AMB   3/16/2023  9:30 AM Leonel Marlin, Tere Gaucher, MD [facility] MAIN BS AMB   5/25/2023 10:40 AM Jenniffer Lopez, NP [facility] BS AMB       Requested Prescriptions     Signed Prescriptions Disp Refills    pindoloL (VISKIN) 5 mg tablet 180 Tablet 1     Sig: TAKE 1 TABLET TWICE A DAY     Authorizing Provider: Maggie Ortega     Ordering User: JULIO CÉSAR STOVER         Other Comments:  Verbal order per provider. Order (medication, dose, route, frequency, amount, refills) repeated and verified twice.

## 2023-03-12 NOTE — PROGRESS NOTES
Mr. Manjula Bundy is a 80 y.o. male who is here for evaluation of   Chief Complaint   Patient presents with    Hypertension    Diabetes    Cholesterol Problem   . ASSESSMENT AND PLAN:    1. Type 2 diabetes with nephropathy (HCC)  Diet controlled. 2. Paroxysmal atrial fibrillation (HCC)  Regular rhythm today  3. Essential tremor  stable      No orders of the defined types were placed in this encounter. HPI  80-year-old gentleman with a history of type 2 diabetes, hyperlipidemia and paroxysmal atrial fibrillation returns for interval assessment. Atrial fibrillation-paroxysmal.  Most recent EKG in July 2022 was normal sinus rhythm. He takes an aspirin. Type 2 diabetes-currently only taking metformin. Most recent A1c was in October at 6.3.     ROS:  Denies  fever, chills, cough, chest pain, SOB,  nausea, vomiting, or diarrhea. Denies wt loss, wt gain, hemoptysis, hematochezia or melena. Physical Examination:    Visit Vitals  /70 (BP 1 Location: Right arm, BP Patient Position: Sitting)   Pulse 74   Temp 97.8 °F (36.6 °C) (Temporal)   Resp 16   Ht 5' 6\" (1.676 m)   Wt 171 lb (77.6 kg)   SpO2 98%   BMI 27.60 kg/m²      General:  Alert, cooperative, no distress. Head:  Normocephalic, without obvious abnormality, atraumatic. Eyes:  Conjunctivae/corneas clear. Pupils equal, round, reactive to light. Extraocular movements intact. Lungs:   Clear to auscultation bilaterally. Chest wall:  No tenderness or deformity. Cardiac:  RRR   Abdomen:   Soft, non-tender. Bowel sounds normal. No masses. No organomegaly. Extremities: Extremities normal, atraumatic, no cyanosis or edema. Pulses: 2+ and symmetric all extremities. Skin: Skin color, texture, turgor normal. No rashes or lesions. Lymph nodes: Cervical, supraclavicular, and axillary nodes normal.   Neurologic: CNII-XII intact. Normal strength, sensation, and reflexes throughout.      On this date 03/16/2023 I have spent 20 minutes reviewing previous notes, test results and face to face with the patient discussing the diagnosis and importance of compliance with the treatment plan as well as documenting on the day of the visit.     Valentin Rouse MD FACP    (signed electronically) on 3/16/2023 at 9:43 AM

## 2023-03-16 ENCOUNTER — OFFICE VISIT (OUTPATIENT)
Dept: FAMILY MEDICINE CLINIC | Age: 88
End: 2023-03-16
Payer: MEDICARE

## 2023-03-16 VITALS
BODY MASS INDEX: 27.48 KG/M2 | SYSTOLIC BLOOD PRESSURE: 132 MMHG | WEIGHT: 171 LBS | RESPIRATION RATE: 16 BRPM | DIASTOLIC BLOOD PRESSURE: 70 MMHG | HEART RATE: 74 BPM | TEMPERATURE: 97.8 F | HEIGHT: 66 IN | OXYGEN SATURATION: 98 %

## 2023-03-16 DIAGNOSIS — I48.0 PAROXYSMAL ATRIAL FIBRILLATION (HCC): ICD-10-CM

## 2023-03-16 DIAGNOSIS — G25.0 ESSENTIAL TREMOR: ICD-10-CM

## 2023-03-16 DIAGNOSIS — E11.21 TYPE 2 DIABETES WITH NEPHROPATHY (HCC): Primary | ICD-10-CM

## 2023-03-16 PROCEDURE — G8510 SCR DEP NEG, NO PLAN REQD: HCPCS | Performed by: INTERNAL MEDICINE

## 2023-03-16 PROCEDURE — 1101F PT FALLS ASSESS-DOCD LE1/YR: CPT | Performed by: INTERNAL MEDICINE

## 2023-03-16 PROCEDURE — G8427 DOCREV CUR MEDS BY ELIG CLIN: HCPCS | Performed by: INTERNAL MEDICINE

## 2023-03-16 PROCEDURE — G8536 NO DOC ELDER MAL SCRN: HCPCS | Performed by: INTERNAL MEDICINE

## 2023-03-16 PROCEDURE — 1123F ACP DISCUSS/DSCN MKR DOCD: CPT | Performed by: INTERNAL MEDICINE

## 2023-03-16 PROCEDURE — G8417 CALC BMI ABV UP PARAM F/U: HCPCS | Performed by: INTERNAL MEDICINE

## 2023-03-16 PROCEDURE — 99213 OFFICE O/P EST LOW 20 MIN: CPT | Performed by: INTERNAL MEDICINE

## 2023-03-16 NOTE — PROGRESS NOTES
Tatyana Culver is a 80 y.o. male presenting for/with:    Chief Complaint   Patient presents with    Hypertension    Diabetes    Cholesterol Problem       Visit Vitals  /70 (BP 1 Location: Right arm, BP Patient Position: Sitting)   Pulse 74   Temp 97.8 °F (36.6 °C) (Temporal)   Resp 16   Ht 5' 6\" (1.676 m)   Wt 171 lb (77.6 kg)   SpO2 98%   BMI 27.60 kg/m²     Pain Scale: 0 - No pain/10  Pain Location:     1. \"Have you been to the ER, urgent care clinic since your last visit? Hospitalized since your last visit? \" No    2. \"Have you seen or consulted any other health care providers outside of the [address] [place] [address] since your last visit? \" No     3. For patients aged 39-70: Has the patient had a colonoscopy / FIT/ Cologuard? NA - based on age      If the patient is female:    4. For patients aged 41-77: Has the patient had a mammogram within the past 2 years? NA - based on age or sex      11. For patients aged 21-65: Has the patient had a pap smear? NA - based on age or sex          Patient    Learning Assessment 11/22/2022   PRIMARY LEARNER Patient   [address] CAREGIVER Yes   CO-LEARNER NAME Paco Lewis   PRIMARY LANGUAGE ENGLISH   LEARNER PREFERENCE PRIMARY DEMONSTRATION     -   ANSWERED BY pt   RELATIONSHIP SELF     Fall Risk Assessment, last 12 mths 11/22/2022   Able to walk? Yes   Fall in past 12 months? 0   Do you feel unsteady? 0   Are you worried about falling 0   Is the gait abnormal? -   Number of falls in past 12 months -   Fall with injury? -       3 most recent PHQ Screens 3/16/2023   Little interest or pleasure in doing things Not at all   Feeling down, depressed, irritable, or hopeless Not at all   Total Score PHQ 2 0     Abuse Screening Questionnaire 11/22/2022   Do you ever feel afraid of your partner? N   Are you in a relationship with someone who physically or mentally threatens you? N   Is it safe for you to go home?  Y       ADL Assessment 10/6/2022   Feeding yourself No Help Needed   Getting from bed to chair No Help Needed   Getting dressed No Help Needed   Bathing or showering No Help Needed   Walk across the room (includes cane/walker) No Help Needed   Using the telphone No Help Needed   Taking your medications No Help Needed   Preparing meals No Help Needed   Managing money (expenses/bills) No Help Needed   Moderately strenuous housework (laundry) No Help Needed   Shopping for personal items (toiletries/medicines) No Help Needed   Shopping for groceries No Help Needed   Driving No Help Needed   Climbing a flight of stairs No Help Needed   Getting to places beyond walking distances No Help Needed      Advance Care Planning 10/6/2022   Patient's Healthcare Decision Maker is: Named in scanned ACP document   Confirm Advance Directive Yes, on file

## 2023-03-17 NOTE — PROGRESS NOTES
Identified pt with two pt identifiers(name and ). Reviewed record in preparation for visit and have obtained necessary documentation. Chief Complaint   Patient presents with    Irregular Heart Beat    Hypertension    Cholesterol Problem      Vitals:    22 1422   BP: 130/70   Pulse: 70   Resp: 20   Temp: 98.3 °F (36.8 °C)   TempSrc: Temporal   SpO2: 98%   Weight: 169 lb (76.7 kg)   Height: 5' 6\" (1.676 m)   PainSc:   0 - No pain       Medications reviewed/approved by provider. Health Maintenance Review: Patient reminded of \"due or due soon\" health maintenance. I have asked the patient to contact his/her primary care provider (PCP) for follow-up on his/her health maintenance. Coordination of Care Questionnaire:  :   1) Have you been to an emergency room, urgent care, or hospitalized since your last visit? If yes, where when, and reason for visit? yes [facility] muscle spasms      2. Have seen or consulted any other health care provider since your last visit? If yes, where when, and reason for visit? NO      Patient is accompanied by wife I have received verbal consent from Kamar Grullon to discuss any/all medical information while they are present in the room. Pt is now schedule for 3/22/23

## 2023-04-03 PROBLEM — E11.65 TYPE 2 DIABETES MELLITUS WITH HYPERGLYCEMIA, WITHOUT LONG-TERM CURRENT USE OF INSULIN (HCC): Status: RESOLVED | Noted: 2017-10-19 | Resolved: 2021-09-19

## 2023-05-20 RX ORDER — PRAVASTATIN SODIUM 20 MG
1 TABLET ORAL NIGHTLY
COMMUNITY
Start: 2023-01-18

## 2023-05-20 RX ORDER — FERROUS SULFATE 325(65) MG
TABLET ORAL
COMMUNITY

## 2023-05-20 RX ORDER — PANTOPRAZOLE SODIUM 40 MG/1
1 TABLET, DELAYED RELEASE ORAL DAILY
COMMUNITY
Start: 2019-11-15

## 2023-05-20 RX ORDER — ASPIRIN 81 MG/1
81 TABLET ORAL DAILY
COMMUNITY
Start: 2016-01-26

## 2023-05-20 RX ORDER — METHOCARBAMOL 750 MG/1
750 TABLET, FILM COATED ORAL 3 TIMES DAILY PRN
COMMUNITY
Start: 2022-07-28

## 2023-05-20 RX ORDER — LORATADINE 10 MG/1
10 TABLET ORAL DAILY
COMMUNITY

## 2023-05-20 RX ORDER — PINDOLOL 5 MG/1
1 TABLET ORAL 2 TIMES DAILY
COMMUNITY
Start: 2023-01-31

## 2023-05-20 RX ORDER — ACETAMINOPHEN 500 MG
TABLET ORAL EVERY 6 HOURS PRN
COMMUNITY

## 2023-07-25 RX ORDER — PINDOLOL 5 MG/1
TABLET ORAL
Qty: 180 TABLET | Refills: 3 | Status: SHIPPED | OUTPATIENT
Start: 2023-07-25

## 2023-07-25 NOTE — TELEPHONE ENCOUNTER
Per VO of MD    LOV: 11/22/2022    Future Appointments   Date Time Provider 4600  [facility]   8/17/2023 11:10 AM Sb Young MD [facility] MAIN BS AMB       Requested Prescriptions     Signed Prescriptions Disp Refills    pindolol (VISKEN) 5 MG tablet 180 tablet 3     Sig: TAKE 1 TABLET TWICE A DAY     Authorizing Provider: Josh Szymanski     Ordering User: Ayala Joshi

## 2023-08-17 ENCOUNTER — OFFICE VISIT (OUTPATIENT)
Age: 88
End: 2023-08-17
Payer: MEDICARE

## 2023-08-17 VITALS
WEIGHT: 146.4 LBS | OXYGEN SATURATION: 100 % | SYSTOLIC BLOOD PRESSURE: 138 MMHG | RESPIRATION RATE: 18 BRPM | BODY MASS INDEX: 23.53 KG/M2 | DIASTOLIC BLOOD PRESSURE: 62 MMHG | HEIGHT: 66 IN | HEART RATE: 60 BPM

## 2023-08-17 DIAGNOSIS — I10 ESSENTIAL (PRIMARY) HYPERTENSION: Primary | ICD-10-CM

## 2023-08-17 PROCEDURE — 99213 OFFICE O/P EST LOW 20 MIN: CPT | Performed by: INTERNAL MEDICINE

## 2023-08-17 PROCEDURE — G8420 CALC BMI NORM PARAMETERS: HCPCS | Performed by: INTERNAL MEDICINE

## 2023-08-17 PROCEDURE — G8427 DOCREV CUR MEDS BY ELIG CLIN: HCPCS | Performed by: INTERNAL MEDICINE

## 2023-08-17 PROCEDURE — 1123F ACP DISCUSS/DSCN MKR DOCD: CPT | Performed by: INTERNAL MEDICINE

## 2023-08-17 PROCEDURE — 4004F PT TOBACCO SCREEN RCVD TLK: CPT | Performed by: INTERNAL MEDICINE

## 2023-08-17 ASSESSMENT — PATIENT HEALTH QUESTIONNAIRE - PHQ9
SUM OF ALL RESPONSES TO PHQ9 QUESTIONS 1 & 2: 6
SUM OF ALL RESPONSES TO PHQ QUESTIONS 1-9: 15
9. THOUGHTS THAT YOU WOULD BE BETTER OFF DEAD, OR OF HURTING YOURSELF: 0
1. LITTLE INTEREST OR PLEASURE IN DOING THINGS: 3
SUM OF ALL RESPONSES TO PHQ QUESTIONS 1-9: 15
5. POOR APPETITE OR OVEREATING: 3
4. FEELING TIRED OR HAVING LITTLE ENERGY: 3
2. FEELING DOWN, DEPRESSED OR HOPELESS: 3
SUM OF ALL RESPONSES TO PHQ QUESTIONS 1-9: 15
SUM OF ALL RESPONSES TO PHQ QUESTIONS 1-9: 15
8. MOVING OR SPEAKING SO SLOWLY THAT OTHER PEOPLE COULD HAVE NOTICED. OR THE OPPOSITE, BEING SO FIGETY OR RESTLESS THAT YOU HAVE BEEN MOVING AROUND A LOT MORE THAN USUAL: 0
6. FEELING BAD ABOUT YOURSELF - OR THAT YOU ARE A FAILURE OR HAVE LET YOURSELF OR YOUR FAMILY DOWN: 0
7. TROUBLE CONCENTRATING ON THINGS, SUCH AS READING THE NEWSPAPER OR WATCHING TELEVISION: 0
3. TROUBLE FALLING OR STAYING ASLEEP: 3
10. IF YOU CHECKED OFF ANY PROBLEMS, HOW DIFFICULT HAVE THESE PROBLEMS MADE IT FOR YOU TO DO YOUR WORK, TAKE CARE OF THINGS AT HOME, OR GET ALONG WITH OTHER PEOPLE: 0

## 2023-08-17 NOTE — PROGRESS NOTES
1. Essential (primary) hypertension  Blood pressure is well controlled. Continue current medications including pindolol 5 mg twice a day. Chief Complaint   Patient presents with    Weight Loss     States is slowly putting weight back on.  has gotten down 135lbs per PT. Other     States is having a difficult time with his wife.  has sitters for her and an aide to sit with her for a few hours a week. Has his 1- 60 yo daughters helping with care.  wife will only recognize him if he is in a red shirt         No orders of the defined types were placed in this encounter. Jonathan Blum MD, FACP      HPI:        Amy Yang is a 80 y.o. male who arrives for evaluation of hypertension. He is under a great deal of stress caring for his elderly wife who is in hospice. He has 3 daughters all of whom are in somewhat of a different opinion as to how to proceed. There is 1 daughter from [facility] that comes every weekend and stay Saturday and Sunday. His appetite is better but he notes that his meals are quite random and coincide with when his wife is awake and when she is asleep.         No Known Allergies    Outpatient Encounter Medications as of 8/17/2023   Medication Sig Dispense Refill    pindolol (VISKEN) 5 MG tablet TAKE 1 TABLET TWICE A  tablet 3    Chromium-Cinnamon 100-500 MCG-MG CAPS Take 1 capsule by mouth daily      methocarbamol (ROBAXIN) 750 MG tablet Take 1 tablet by mouth 3 times daily as needed      pravastatin (PRAVACHOL) 20 MG tablet Take 1 tablet by mouth nightly      MAGNESIUM CITRATE PO Take by mouth (Patient not taking: Reported on 8/17/2023)      acetaminophen (TYLENOL) 500 MG tablet Take by mouth every 6 hours as needed (Patient not taking: Reported on 8/17/2023)      aspirin 81 MG EC tablet Take 1 tablet by mouth daily (Patient not taking: Reported on 8/17/2023)      ferrous sulfate (IRON 325) 325 (65 Fe) MG tablet Take by mouth every morning (before

## 2024-01-05 RX ORDER — PINDOLOL 5 MG/1
5 TABLET ORAL 2 TIMES DAILY
Qty: 180 TABLET | Refills: 3 | Status: SHIPPED | OUTPATIENT
Start: 2024-01-05

## 2024-01-05 RX ORDER — PINDOLOL 5 MG/1
5 TABLET ORAL 2 TIMES DAILY
Qty: 180 TABLET | Refills: 3 | OUTPATIENT
Start: 2024-01-05

## 2024-01-05 RX ORDER — PRAVASTATIN SODIUM 20 MG
20 TABLET ORAL NIGHTLY
Qty: 90 TABLET | Refills: 4 | Status: SHIPPED | OUTPATIENT
Start: 2024-01-05

## 2024-02-05 ENCOUNTER — OFFICE VISIT (OUTPATIENT)
Age: 89
End: 2024-02-05
Payer: MEDICARE

## 2024-02-05 VITALS
TEMPERATURE: 97.1 F | WEIGHT: 152.6 LBS | SYSTOLIC BLOOD PRESSURE: 120 MMHG | HEIGHT: 66 IN | HEART RATE: 63 BPM | BODY MASS INDEX: 24.53 KG/M2 | DIASTOLIC BLOOD PRESSURE: 60 MMHG | OXYGEN SATURATION: 94 % | RESPIRATION RATE: 18 BRPM

## 2024-02-05 DIAGNOSIS — I48.11 LONGSTANDING PERSISTENT ATRIAL FIBRILLATION (HCC): Primary | ICD-10-CM

## 2024-02-05 DIAGNOSIS — Z11.1 SCREENING FOR TUBERCULOSIS: ICD-10-CM

## 2024-02-05 DIAGNOSIS — E11.21 TYPE 2 DIABETES MELLITUS WITH DIABETIC NEPHROPATHY, WITHOUT LONG-TERM CURRENT USE OF INSULIN (HCC): ICD-10-CM

## 2024-02-05 DIAGNOSIS — G25.0 ESSENTIAL TREMOR: ICD-10-CM

## 2024-02-05 PROCEDURE — G8420 CALC BMI NORM PARAMETERS: HCPCS | Performed by: INTERNAL MEDICINE

## 2024-02-05 PROCEDURE — 99214 OFFICE O/P EST MOD 30 MIN: CPT | Performed by: INTERNAL MEDICINE

## 2024-02-05 PROCEDURE — G8484 FLU IMMUNIZE NO ADMIN: HCPCS | Performed by: INTERNAL MEDICINE

## 2024-02-05 PROCEDURE — 1123F ACP DISCUSS/DSCN MKR DOCD: CPT | Performed by: INTERNAL MEDICINE

## 2024-02-05 PROCEDURE — 4004F PT TOBACCO SCREEN RCVD TLK: CPT | Performed by: INTERNAL MEDICINE

## 2024-02-05 PROCEDURE — G8427 DOCREV CUR MEDS BY ELIG CLIN: HCPCS | Performed by: INTERNAL MEDICINE

## 2024-02-05 ASSESSMENT — PATIENT HEALTH QUESTIONNAIRE - PHQ9
1. LITTLE INTEREST OR PLEASURE IN DOING THINGS: 0
2. FEELING DOWN, DEPRESSED OR HOPELESS: 0
SUM OF ALL RESPONSES TO PHQ QUESTIONS 1-9: 0
SUM OF ALL RESPONSES TO PHQ9 QUESTIONS 1 & 2: 0
SUM OF ALL RESPONSES TO PHQ QUESTIONS 1-9: 0

## 2024-02-05 NOTE — PROGRESS NOTES
Chaz Granado is a 89 y.o. male presenting for/with:    Chief Complaint   Patient presents with    OTHER     Form to filled out and signed by PCP- [employer] [facility]- Medical Examination     Hypertension    Diabetes       Vitals:    02/05/24 0815   BP: 120/60   Site: Left Upper Arm   Position: Sitting   Cuff Size: Medium Adult   Pulse: 63   Resp: 18   Temp: 97.1 °F (36.2 °C)   TempSrc: Temporal   SpO2: 94%   Weight: 69.2 kg (152 lb 9.6 oz)   Height: 1.676 m (5' 6\")       Pain Scale: 0 - No pain/10  Pain Location:     \"Have you been to the ER, urgent care clinic since your last visit?  Hospitalized since your last visit?\"    NO    “Have you seen or consulted any other health care providers outside of [facility] since your last visit?”    NO                 2/5/2024     8:12 AM   PHQ-9    Little interest or pleasure in doing things 0   Feeling down, depressed, or hopeless 0   PHQ-2 Score 0   PHQ-9 Total Score 0           2/5/2024     8:30 AM 6/16/2023    11:30 AM 11/22/2022    12:00 AM   [facility] AMB LEARNING ASSESSMENT   Primary Learner Patient Patient Patient   co-learner caregiver   Yes   Co-Learner Name   Adri Granado   Primary Language ENGLISH ENGLISH ENGLISH   Learning Preference DEMONSTRATION DEMONSTRATION DEMONSTRATION   Answered By patient patient pt   Relationship to Learner SELF SELF SELF            2/5/2024     8:13 AM   Amb Fall Risk Assessment and TUG Test   Do you feel unsteady or are you worried about falling?  no   2 or more falls in past year? no   Fall with injury in past year? no           2/5/2024     8:00 AM 8/17/2023    11:00 AM 6/16/2023    11:00 AM   ADL ASSESSMENT   Feeding yourself No Help Needed No Help Needed No Help Needed   Getting from bed to chair No Help Needed No Help Needed No Help Needed   Getting dressed No Help Needed No Help Needed No Help Needed   Bathing or showering No Help Needed No Help Needed No Help Needed   Walk across the room (includes

## 2024-02-09 LAB
GAMMA INTERFERON BACKGROUND BLD IA-ACNC: 0.11 IU/ML
M TB IFN-G BLD-IMP: NEGATIVE
M TB IFN-G CD4+ T-CELLS BLD-ACNC: 0.06 IU/ML
M TBIFN-G CD4+ CD8+T-CELLS BLD-ACNC: 0.05 IU/ML
MITOGEN IGNF BLD-ACNC: >10 IU/ML
QUANTIFERON, INCUBATION: NORMAL
SERVICE CMNT-IMP: NORMAL